# Patient Record
Sex: FEMALE | Race: BLACK OR AFRICAN AMERICAN | NOT HISPANIC OR LATINO | Employment: FULL TIME | URBAN - METROPOLITAN AREA
[De-identification: names, ages, dates, MRNs, and addresses within clinical notes are randomized per-mention and may not be internally consistent; named-entity substitution may affect disease eponyms.]

---

## 2017-09-25 ENCOUNTER — HISTORICAL (OUTPATIENT)
Dept: ADMINISTRATIVE | Facility: HOSPITAL | Age: 47
End: 2017-09-25

## 2017-12-13 ENCOUNTER — HISTORICAL (OUTPATIENT)
Dept: ENDOSCOPY | Facility: HOSPITAL | Age: 47
End: 2017-12-13

## 2017-12-13 LAB
B-HCG SERPL QL: NEGATIVE
CRC RECOMMENDATION EXT: NORMAL

## 2018-08-13 ENCOUNTER — HISTORICAL (OUTPATIENT)
Dept: RADIOLOGY | Facility: HOSPITAL | Age: 48
End: 2018-08-13

## 2021-03-02 ENCOUNTER — HISTORICAL (OUTPATIENT)
Dept: ADMINISTRATIVE | Facility: HOSPITAL | Age: 51
End: 2021-03-02

## 2021-06-09 LAB
BILIRUB SERPL-MCNC: NEGATIVE MG/DL
BLOOD URINE, POC: NEGATIVE
C TRACH DNA SPEC QL NAA+PROBE: NEGATIVE
CLARITY, POC UA: CLEAR
COLOR, POC UA: YELLOW
GLUCOSE UR QL STRIP: NEGATIVE
HUMAN PAPILLOMAVIRUS (HPV): NORMAL
KETONES UR QL STRIP: NEGATIVE
LEUKOCYTE EST, POC UA: NEGATIVE
NITRITE, POC UA: NEGATIVE
PAP RECOMMENDATION EXT: NORMAL
PAP SMEAR: NORMAL
PH, POC UA: 5.5
PROTEIN, POC: NORMAL
SPECIFIC GRAVITY, POC UA: NORMAL
UROBILINOGEN, POC UA: NORMAL

## 2021-08-18 ENCOUNTER — HISTORICAL (OUTPATIENT)
Dept: RADIOLOGY | Facility: HOSPITAL | Age: 51
End: 2021-08-18

## 2021-09-14 ENCOUNTER — HISTORICAL (OUTPATIENT)
Dept: ADMINISTRATIVE | Facility: HOSPITAL | Age: 51
End: 2021-09-14

## 2021-09-14 LAB
ABS NEUT (OLG): 4.93 X10(3)/MCL (ref 2.1–9.2)
ALBUMIN SERPL-MCNC: 4.5 GM/DL (ref 3.5–5)
ALBUMIN/GLOB SERPL: 1.1 RATIO (ref 1.1–2)
ALP SERPL-CCNC: 68 UNIT/L (ref 40–150)
ALT SERPL-CCNC: 23 UNIT/L (ref 0–55)
AST SERPL-CCNC: 20 UNIT/L (ref 5–34)
BASOPHILS # BLD AUTO: 0 X10(3)/MCL (ref 0–0.2)
BASOPHILS NFR BLD AUTO: 0 %
BILIRUB SERPL-MCNC: 0.4 MG/DL
BILIRUBIN DIRECT+TOT PNL SERPL-MCNC: 0.2 MG/DL (ref 0–0.5)
BILIRUBIN DIRECT+TOT PNL SERPL-MCNC: 0.2 MG/DL (ref 0–0.8)
BUN SERPL-MCNC: 12.2 MG/DL (ref 7–18.7)
CALCIUM SERPL-MCNC: 9.9 MG/DL (ref 8.4–10.2)
CHLORIDE SERPL-SCNC: 105 MMOL/L (ref 98–107)
CO2 SERPL-SCNC: 22 MMOL/L (ref 22–29)
CORTIS SERPL-SCNC: 9.7 UG/DL
CREAT SERPL-MCNC: 0.82 MG/DL (ref 0.55–1.02)
EOSINOPHIL # BLD AUTO: 0.1 X10(3)/MCL (ref 0–0.9)
EOSINOPHIL NFR BLD AUTO: 1 %
ERYTHROCYTE [DISTWIDTH] IN BLOOD BY AUTOMATED COUNT: 12.9 % (ref 11.5–14.5)
ESTRADIOL SERPL HS-MCNC: <24 PG/ML
FSH SERPL-ACNC: 74.32 MIU/ML
GLOBULIN SER-MCNC: 4.1 GM/DL (ref 2.4–3.5)
GLUCOSE SERPL-MCNC: 84 MG/DL (ref 74–100)
HCT VFR BLD AUTO: 46.9 % (ref 35–46)
HGB BLD-MCNC: 15.1 GM/DL (ref 12–16)
IMM GRANULOCYTES # BLD AUTO: 0.04 10*3/UL
IMM GRANULOCYTES NFR BLD AUTO: 0 %
LH SERPL-ACNC: 21.34 MIU/ML
LYMPHOCYTES # BLD AUTO: 2.4 X10(3)/MCL (ref 0.6–4.6)
LYMPHOCYTES NFR BLD AUTO: 30 %
MCH RBC QN AUTO: 32.5 PG (ref 26–34)
MCHC RBC AUTO-ENTMCNC: 32.2 GM/DL (ref 31–37)
MCV RBC AUTO: 101.1 FL (ref 80–100)
MONOCYTES # BLD AUTO: 0.4 X10(3)/MCL (ref 0.1–1.3)
MONOCYTES NFR BLD AUTO: 5 %
NEUTROPHILS # BLD AUTO: 4.93 X10(3)/MCL (ref 2.1–9.2)
NEUTROPHILS NFR BLD AUTO: 62 %
NRBC BLD AUTO-RTO: 0 % (ref 0–0.2)
PLATELET # BLD AUTO: 247 X10(3)/MCL (ref 130–400)
PMV BLD AUTO: 10.2 FL (ref 7.4–10.4)
POTASSIUM SERPL-SCNC: 4.1 MMOL/L (ref 3.5–5.1)
PROLACTIN LEVEL (OHS): 3.9 NG/ML (ref 5.18–26.53)
PROT SERPL-MCNC: 8.6 GM/DL (ref 6.4–8.3)
RBC # BLD AUTO: 4.64 X10(6)/MCL (ref 4–5.2)
SODIUM SERPL-SCNC: 139 MMOL/L (ref 136–145)
T4 FREE SERPL-MCNC: 1.04 NG/DL (ref 0.7–1.48)
TSH SERPL-ACNC: 0.44 UIU/ML (ref 0.35–4.94)
WBC # SPEC AUTO: 7.9 X10(3)/MCL (ref 4.5–11)

## 2021-09-28 ENCOUNTER — HISTORICAL (OUTPATIENT)
Dept: LAB | Facility: HOSPITAL | Age: 51
End: 2021-09-28

## 2021-09-28 LAB
ABS NEUT (OLG): 5.8 X10(3)/MCL (ref 2.1–9.2)
BASOPHILS # BLD AUTO: 0 X10(3)/MCL (ref 0–0.2)
BASOPHILS NFR BLD AUTO: 0 %
CORTIS SERPL-SCNC: <1 UG/DL
EOSINOPHIL # BLD AUTO: 0 X10(3)/MCL (ref 0–0.9)
EOSINOPHIL NFR BLD AUTO: 0 %
ERYTHROCYTE [DISTWIDTH] IN BLOOD BY AUTOMATED COUNT: 12.8 % (ref 11.5–17)
FOLATE SERPL-MCNC: 5.8 NG/ML (ref 7–31.4)
HCT VFR BLD AUTO: 39.3 % (ref 37–47)
HGB BLD-MCNC: 12.9 GM/DL (ref 12–16)
IMM GRANULOCYTES # BLD AUTO: 0.02 % (ref 0–0.02)
IMM GRANULOCYTES NFR BLD AUTO: 0.3 % (ref 0–0.43)
LYMPHOCYTES # BLD AUTO: 1.5 X10(3)/MCL (ref 0.6–4.6)
LYMPHOCYTES NFR BLD AUTO: 19 %
MCH RBC QN AUTO: 32.8 PG (ref 27–31)
MCHC RBC AUTO-ENTMCNC: 32.8 GM/DL (ref 33–36)
MCV RBC AUTO: 100 FL (ref 80–94)
MONOCYTES # BLD AUTO: 0.2 X10(3)/MCL (ref 0.1–1.3)
MONOCYTES NFR BLD AUTO: 3 %
NEUTROPHILS # BLD AUTO: 5.8 X10(3)/MCL (ref 1.4–7.9)
NEUTROPHILS NFR BLD AUTO: 77 %
PLATELET # BLD AUTO: 267 X10(3)/MCL (ref 130–400)
PMV BLD AUTO: 9.7 FL (ref 9.4–12.4)
RBC # BLD AUTO: 3.93 X10(6)/MCL (ref 4.2–5.4)
RET# (OHS): 0.06 X10^6/ML (ref 0.02–0.08)
RETICULOCYTE COUNT AUTOMATED (OLG): 1.6 % (ref 1.1–2.1)
VIT B12 SERPL-MCNC: 1054 PG/ML (ref 213–816)
WBC # SPEC AUTO: 7.5 X10(3)/MCL (ref 4.5–11.5)

## 2021-09-29 ENCOUNTER — HISTORICAL (OUTPATIENT)
Dept: RADIOLOGY | Facility: HOSPITAL | Age: 51
End: 2021-09-29

## 2021-12-10 ENCOUNTER — HISTORICAL (OUTPATIENT)
Dept: RADIOLOGY | Facility: HOSPITAL | Age: 51
End: 2021-12-10

## 2022-02-02 ENCOUNTER — HISTORICAL (OUTPATIENT)
Dept: LAB | Facility: HOSPITAL | Age: 52
End: 2022-02-02

## 2022-02-02 LAB
ABS NEUT (OLG): 6.44 (ref 2.1–9.2)
ALBUMIN SERPL-MCNC: 4.3 G/DL (ref 3.5–5)
ALBUMIN/GLOB SERPL: 1.6 {RATIO} (ref 1.1–2)
ALP SERPL-CCNC: 67 U/L (ref 40–150)
ALT SERPL-CCNC: 18 U/L (ref 0–55)
AST SERPL-CCNC: 17 U/L (ref 5–34)
BASOPHILS # BLD AUTO: 0 10*3/UL (ref 0–0.2)
BASOPHILS NFR BLD AUTO: 0 %
BILIRUB SERPL-MCNC: 0.2 MG/DL
BILIRUBIN DIRECT+TOT PNL SERPL-MCNC: <0.1 (ref 0–0.5)
BILIRUBIN DIRECT+TOT PNL SERPL-MCNC: >0.1 (ref 0–0.8)
BUN SERPL-MCNC: 11.4 MG/DL (ref 9.8–20.1)
CALCIUM SERPL-MCNC: 9.3 MG/DL (ref 8.7–10.5)
CHLORIDE SERPL-SCNC: 107 MMOL/L (ref 98–107)
CHOLEST SERPL-MCNC: 237 MG/DL
CHOLEST/HDLC SERPL: 4 {RATIO} (ref 0–5)
CO2 SERPL-SCNC: 27 MMOL/L (ref 22–29)
CREAT SERPL-MCNC: 0.64 MG/DL (ref 0.55–1.02)
DEPRECATED CALCIDIOL+CALCIFEROL SERPL-MC: 10.1 NG/ML (ref 30–80)
EOSINOPHIL # BLD AUTO: 0.1 10*3/UL (ref 0–0.9)
EOSINOPHIL NFR BLD AUTO: 1 %
ERYTHROCYTE [DISTWIDTH] IN BLOOD BY AUTOMATED COUNT: 13 % (ref 11.5–17)
EST. AVERAGE GLUCOSE BLD GHB EST-MCNC: 102.5 MG/DL
GLOBULIN SER-MCNC: 2.7 G/DL (ref 2.4–3.5)
GLUCOSE SERPL-MCNC: 108 MG/DL (ref 74–100)
HBA1C MFR BLD: 5.2 %
HCT VFR BLD AUTO: 39 % (ref 37–47)
HDLC SERPL-MCNC: 63 MG/DL (ref 35–60)
HEMOLYSIS INTERF INDEX SERPL-ACNC: 3
HGB BLD-MCNC: 12.6 G/DL (ref 12–16)
ICTERIC INTERF INDEX SERPL-ACNC: 0
IMM GRANULOCYTES # BLD AUTO: 0.06 10*3/UL (ref 0–0.02)
IMM GRANULOCYTES NFR BLD AUTO: 0.6 % (ref 0–0.43)
LDLC SERPL CALC-MCNC: 154 MG/DL (ref 50–140)
LIPEMIC INTERF INDEX SERPL-ACNC: 4
LYMPHOCYTES # BLD AUTO: 2.6 10*3/UL (ref 0.6–4.6)
LYMPHOCYTES NFR BLD AUTO: 27 %
MANUAL DIFF? (OHS): NO
MCH RBC QN AUTO: 31.3 PG (ref 27–31)
MCHC RBC AUTO-ENTMCNC: 32.3 G/DL (ref 33–36)
MCV RBC AUTO: 97 FL (ref 80–94)
MONOCYTES # BLD AUTO: 0.3 10*3/UL (ref 0.1–1.3)
MONOCYTES NFR BLD AUTO: 4 %
NEUTROPHILS # BLD AUTO: 6.44 10*3/UL (ref 1.4–7.9)
NEUTROPHILS NFR BLD AUTO: 68 %
PLATELET # BLD AUTO: 280 10*3/UL (ref 130–400)
PMV BLD AUTO: 10 FL (ref 9.4–12.4)
POTASSIUM SERPL-SCNC: 4.2 MMOL/L (ref 3.5–5.1)
PROT SERPL-MCNC: 7 G/DL (ref 6.4–8.3)
RBC # BLD AUTO: 4.02 10*6/UL (ref 4.2–5.4)
SODIUM SERPL-SCNC: 142 MMOL/L (ref 136–145)
T3FREE SERPL-MCNC: 2.06 PG/ML (ref 1.58–3.91)
TRIGL SERPL-MCNC: 100 MG/DL (ref 37–140)
TSH SERPL-ACNC: 1.04 M[IU]/L (ref 0.35–4.94)
VLDLC SERPL CALC-MCNC: 20 MG/DL
WBC # SPEC AUTO: 9.5 10*3/UL (ref 4.5–11.5)

## 2022-02-23 ENCOUNTER — HISTORICAL (OUTPATIENT)
Dept: RADIOLOGY | Facility: HOSPITAL | Age: 52
End: 2022-02-23

## 2022-02-23 ENCOUNTER — HISTORICAL (OUTPATIENT)
Dept: ADMINISTRATIVE | Facility: HOSPITAL | Age: 52
End: 2022-02-23

## 2022-03-07 ENCOUNTER — HISTORICAL (OUTPATIENT)
Dept: RADIOLOGY | Facility: HOSPITAL | Age: 52
End: 2022-03-07

## 2022-04-10 ENCOUNTER — HISTORICAL (OUTPATIENT)
Dept: ADMINISTRATIVE | Facility: HOSPITAL | Age: 52
End: 2022-04-10
Payer: MEDICAID

## 2022-04-29 VITALS
HEIGHT: 69 IN | SYSTOLIC BLOOD PRESSURE: 106 MMHG | BODY MASS INDEX: 23.51 KG/M2 | WEIGHT: 158.75 LBS | DIASTOLIC BLOOD PRESSURE: 75 MMHG | OXYGEN SATURATION: 100 %

## 2022-04-30 NOTE — OP NOTE
Patient:   Galina Walker            MRN: [id number]            FIN: [id number]               Age:   46 years     Sex:  Female     :  1970   Associated Diagnoses:   None   Author:   Hollis Paulino MD      Procedure   Colonoscopy procedure   Physical Exam: vital signs Vital Signs   2017 7:51 CST      Temperature Oral          37 DegC                             Temperature Oral (calculated)             98.60 DegF                             Heart Rate Monitored      86 bpm                             Respiratory Rate          18 br/min                             SpO2                      100 %                             Oxygen Therapy            Room air                             Systolic Blood Pressure   127 mmHg                             Diastolic Blood Pressure  104 mmHg  HI                             Mean Arterial Pressure, Cuff              112 mmHg     .        Impression and Plan   DATE OF PROCEDURE:  17    PATIENT NAME: Galina Walker    MRN: [id number]  PREOPERATIVE DIAGNOSIS: Hx colon cancer s/p subtotal colectomy   POSTOPERATIVE DIAGNOSIS:  same  PROCEDURE PERFORMED:    Flex sigmoidoscopy  ENDOSCOPIST:  Timoteo Majano MD  ASSISTANT:  Hollis Paulino MD  ANESTHESIA:  Deep Sedation  EXTENT OF EXAM:  ileocolic anastomosis  EBL: none  PREPARATION:  Good  LIMITATIONS:  None.  INDICATIONS FOR EXAMINATION:  The patient is a 47yo F Hx colon cancer s/p subtotal colectomy, here for screening colonoscopy  PROCEDURE IN DETAIL:  A physical examination was performed. The major risks and benefits associated with the procedure were explained to the patient in detail. The patient verbalized understanding and agreement with the same.  The patient was connected to the appropriate monitoring devices and an IV was started. Continuous oxygen was provided via nasal cannula and intravenous sedation was administered in divided doses throughout the procedure.   After adequate sedation was  achieved, the patient was placed in the left lateral decubitus position and a digital rectal exam was performed. This examination was within normal limits. A well-lubricated colonoscope was then inserted into the rectum and advanced under direct visualization to the level of the ileocolic anastomosis, about 20cm. The findings were consistent with normal colonic mucosa and patent anastomosis. The scope was then retroflexed to view the dentate line. No abnormalities. The scope was completely retrieved upon exiting the anal canal and the procedure was terminated. The patient was then transferred to the recovery room in stable condition.  ENDOSCOPIC DIAGNOSIS:  Hx colon cancer s/p subtotal colectomy, patent anastomosis, normal colon  RECOMMENDATIONS:  Follow up in 5 years repeat colonoscopy

## 2022-05-01 ENCOUNTER — HOSPITAL ENCOUNTER (EMERGENCY)
Facility: HOSPITAL | Age: 52
Discharge: HOME | End: 2022-05-02
Attending: INTERNAL MEDICINE
Payer: MEDICAID

## 2022-05-01 DIAGNOSIS — K52.9 ILEITIS: Primary | ICD-10-CM

## 2022-05-01 PROCEDURE — 96374 THER/PROPH/DIAG INJ IV PUSH: CPT

## 2022-05-01 PROCEDURE — 99285 EMERGENCY DEPT VISIT HI MDM: CPT | Mod: 25

## 2022-05-01 NOTE — Clinical Note
"Galina Pitts" Aaron was seen and treated in our emergency department on 5/1/2022.  She may return to work on 05/03/2022.       If you have any questions or concerns, please don't hesitate to call.      DEENA GRAHAM    "

## 2022-05-02 VITALS
WEIGHT: 165 LBS | HEART RATE: 63 BPM | RESPIRATION RATE: 16 BRPM | BODY MASS INDEX: 24.44 KG/M2 | DIASTOLIC BLOOD PRESSURE: 73 MMHG | HEIGHT: 69 IN | SYSTOLIC BLOOD PRESSURE: 131 MMHG | OXYGEN SATURATION: 98 % | TEMPERATURE: 98 F

## 2022-05-02 LAB
ALBUMIN SERPL-MCNC: 4.4 GM/DL (ref 3.5–5)
ALBUMIN/GLOB SERPL: 1.3 RATIO (ref 1.1–2)
ALP SERPL-CCNC: 61 UNIT/L (ref 40–150)
ALT SERPL-CCNC: 15 UNIT/L (ref 0–55)
AMORPH PHOS CRY URNS QL MICRO: NORMAL /HPF
AMORPH URATE CRY UR QL COMP ASSIST: NORMAL /HPF
AMORPH URATE CRY URNS QL MICRO: NORMAL /HPF
APPEARANCE UR: CLEAR
AST SERPL-CCNC: 19 UNIT/L (ref 5–34)
BACTERIA #/AREA URNS AUTO: NORMAL /HPF
BASOPHILS # BLD AUTO: 0.03 X10(3)/MCL (ref 0–0.2)
BASOPHILS NFR BLD AUTO: 0.4 %
BILIRUB CRY URNS QL MICRO: NORMAL /HPF
BILIRUB UR QL STRIP.AUTO: NEGATIVE MG/DL
BILIRUBIN DIRECT+TOT PNL SERPL-MCNC: 0.2 MG/DL (ref 0–0.5)
BILIRUBIN DIRECT+TOT PNL SERPL-MCNC: 0.3 MG/DL (ref 0–0.8)
BILIRUBIN DIRECT+TOT PNL SERPL-MCNC: 0.5 MG/DL
BUN SERPL-MCNC: 10.1 MG/DL (ref 9.8–20.1)
CALCIUM SERPL-MCNC: 9.9 MG/DL (ref 8.4–10.2)
CAOX CRY URNS QL MICRO: NORMAL /HPF
CHLORIDE SERPL-SCNC: 100 MMOL/L (ref 98–107)
CHOLEST CRY URNS QL MICRO: NORMAL /HPF
CO2 SERPL-SCNC: 28 MMOL/L (ref 22–29)
COARSE GRAN CASTS URNS QL MICRO: NORMAL /HPF
COLOR UR AUTO: YELLOW
CREAT SERPL-MCNC: 0.75 MG/DL (ref 0.55–1.02)
CYSTINE CRY URNS QL MICRO: NORMAL /HPF
EOSINOPHIL # BLD AUTO: 0.08 X10(3)/MCL (ref 0–0.9)
EOSINOPHIL NFR BLD AUTO: 1 %
ERYTHROCYTE [DISTWIDTH] IN BLOOD BY AUTOMATED COUNT: 13 % (ref 11.5–17)
FINE GRAN CASTS URNS QL MICRO: NORMAL /HPF
GLOBULIN SER-MCNC: 3.3 GM/DL (ref 2.4–3.5)
GLUCOSE SERPL-MCNC: 100 MG/DL (ref 74–100)
GLUCOSE UR QL STRIP.AUTO: NEGATIVE MG/DL
GRAN CASTS URNS QL MICRO: NORMAL /HPF
HCT VFR BLD AUTO: 39.5 % (ref 37–47)
HGB BLD-MCNC: 12.6 GM/DL (ref 12–16)
HYALINE CASTS URNS QL MICRO: NORMAL /HPF
IMM GRANULOCYTES # BLD AUTO: 0.03 X10(3)/MCL (ref 0–0.02)
IMM GRANULOCYTES NFR BLD AUTO: 0.4 % (ref 0–0.43)
KETONES UR QL STRIP.AUTO: NEGATIVE MG/DL
LEUCINE CRY URNS QL MICRO: NORMAL /HPF
LEUKOCYTE ESTERASE UR QL STRIP.AUTO: NEGATIVE UNIT/L
LIPASE SERPL-CCNC: 58 U/L
LYMPHOCYTES # BLD AUTO: 3.35 X10(3)/MCL (ref 0.6–4.6)
LYMPHOCYTES NFR BLD AUTO: 40.2 %
MCH RBC QN AUTO: 31.6 PG (ref 27–31)
MCHC RBC AUTO-ENTMCNC: 31.9 MG/DL (ref 33–36)
MCV RBC AUTO: 99 FL (ref 80–94)
MONOCYTES # BLD AUTO: 0.49 X10(3)/MCL (ref 0.1–1.3)
MONOCYTES NFR BLD AUTO: 5.9 %
MUCOUS THREADS URNS QL MICRO: NORMAL /LPF
NEUTROPHILS # BLD AUTO: 4.4 X10(3)/MCL (ref 2.1–9.2)
NEUTROPHILS NFR BLD AUTO: 52.1 %
NITRITE UR QL STRIP.AUTO: NEGATIVE
NRBC BLD AUTO-RTO: 0 %
PH UR STRIP.AUTO: 6 [PH]
PLATELET # BLD AUTO: 274 X10(3)/MCL (ref 130–400)
PLATELETS.RETICULATED NFR BLD AUTO: 0 % (ref 0.9–11.2)
PMV BLD AUTO: 9.5 FL (ref 9.4–12.4)
POTASSIUM SERPL-SCNC: 3.4 MMOL/L (ref 3.5–5.1)
PROT SERPL-MCNC: 7.7 GM/DL (ref 6.4–8.3)
PROT UR QL STRIP.AUTO: NEGATIVE MG/DL
RBC # BLD AUTO: 3.99 X10(6)/MCL (ref 4.2–5.4)
RBC #/AREA URNS AUTO: NORMAL /HPF
RBC CASTS URNS QL MICRO: NORMAL /HPF
RBC UR QL AUTO: NEGATIVE UNIT/L
RENAL EPI CELLS #/AREA UR COMP ASSIST: NORMAL /HPF
SODIUM SERPL-SCNC: 138 MMOL/L (ref 136–145)
SP GR UR STRIP.AUTO: <1.005
SPERM URNS QL MICRO: NORMAL /HPF
SQUAMOUS #/AREA URNS AUTO: NORMAL /LPF
T VAGINALIS URNS QL MICRO: NORMAL /HPF
TRI-PHOS CRY URNS QL MICRO: NORMAL /HPF
TYROSINE CRY URNS QL MICRO: NORMAL /HPF
URATE CRY URNS QL MICRO: NORMAL /HPF
UROBILINOGEN UR STRIP-ACNC: 0.2 MG/DL
WAXY CASTS URNS QL MICRO: NORMAL /HPF
WBC # SPEC AUTO: 8.3 X10(3)/MCL (ref 4.5–11.5)
WBC #/AREA URNS AUTO: NORMAL /HPF
WBC CASTS URNS QL MICRO: NORMAL /HPF
WBC CLUMPS UR QL AUTO: NORMAL /HPF
YEAST URNS QL MICRO: NORMAL /HPF

## 2022-05-02 PROCEDURE — 25000003 PHARM REV CODE 250

## 2022-05-02 PROCEDURE — 80053 COMPREHEN METABOLIC PANEL: CPT | Performed by: INTERNAL MEDICINE

## 2022-05-02 PROCEDURE — 63600175 PHARM REV CODE 636 W HCPCS: Performed by: INTERNAL MEDICINE

## 2022-05-02 PROCEDURE — 85025 COMPLETE CBC W/AUTO DIFF WBC: CPT | Performed by: INTERNAL MEDICINE

## 2022-05-02 PROCEDURE — 81015 MICROSCOPIC EXAM OF URINE: CPT | Performed by: INTERNAL MEDICINE

## 2022-05-02 PROCEDURE — 81003 URINALYSIS AUTO W/O SCOPE: CPT | Performed by: INTERNAL MEDICINE

## 2022-05-02 PROCEDURE — 83690 ASSAY OF LIPASE: CPT | Performed by: INTERNAL MEDICINE

## 2022-05-02 PROCEDURE — 36415 COLL VENOUS BLD VENIPUNCTURE: CPT | Performed by: INTERNAL MEDICINE

## 2022-05-02 PROCEDURE — 96372 THER/PROPH/DIAG INJ SC/IM: CPT | Performed by: INTERNAL MEDICINE

## 2022-05-02 RX ORDER — CIPROFLOXACIN 500 MG/1
TABLET ORAL
Status: COMPLETED
Start: 2022-05-02 | End: 2022-05-02

## 2022-05-02 RX ORDER — MORPHINE SULFATE 4 MG/ML
2 INJECTION, SOLUTION INTRAMUSCULAR; INTRAVENOUS
Status: COMPLETED | OUTPATIENT
Start: 2022-05-02 | End: 2022-05-02

## 2022-05-02 RX ORDER — MORPHINE SULFATE 4 MG/ML
4 INJECTION, SOLUTION INTRAMUSCULAR; INTRAVENOUS
Status: COMPLETED | OUTPATIENT
Start: 2022-05-02 | End: 2022-05-02

## 2022-05-02 RX ORDER — METRONIDAZOLE 500 MG/1
TABLET ORAL
Status: COMPLETED
Start: 2022-05-02 | End: 2022-05-02

## 2022-05-02 RX ORDER — CIPROFLOXACIN 500 MG/1
500 TABLET ORAL
Status: COMPLETED | OUTPATIENT
Start: 2022-05-02 | End: 2022-05-02

## 2022-05-02 RX ORDER — CIPROFLOXACIN 500 MG/1
500 TABLET ORAL 2 TIMES DAILY
Qty: 20 TABLET | Refills: 0 | Status: SHIPPED | OUTPATIENT
Start: 2022-05-02 | End: 2022-05-12

## 2022-05-02 RX ORDER — METRONIDAZOLE 500 MG/1
500 TABLET ORAL
Status: COMPLETED | OUTPATIENT
Start: 2022-05-02 | End: 2022-05-02

## 2022-05-02 RX ORDER — METRONIDAZOLE 500 MG/1
500 TABLET ORAL 3 TIMES DAILY
Qty: 21 TABLET | Refills: 0 | Status: SHIPPED | OUTPATIENT
Start: 2022-05-02 | End: 2022-05-12

## 2022-05-02 RX ADMIN — METRONIDAZOLE 500 MG: 500 TABLET ORAL at 04:05

## 2022-05-02 RX ADMIN — CIPROFLOXACIN 500 MG: 500 TABLET ORAL at 04:05

## 2022-05-02 RX ADMIN — MORPHINE SULFATE 2 MG: 4 INJECTION, SOLUTION INTRAMUSCULAR; INTRAVENOUS at 01:05

## 2022-05-02 RX ADMIN — MORPHINE SULFATE 4 MG: 4 INJECTION, SOLUTION INTRAMUSCULAR; INTRAVENOUS at 02:05

## 2022-05-02 RX ADMIN — CIPROFLOXACIN 500 MG: 500 TABLET, FILM COATED ORAL at 04:05

## 2022-05-02 NOTE — HISTORICAL [facility] CERNER
This is a historical note converted from [facility]ia. Formatting and pictures may have been removed.  Please reference [facility] for original formatting and attached multimedia. History of Present Illness  51 y/o female with history of migraines, colon cancer (s/p part. colectomy, never received radiation or chemo), SBO, depression, tubal ligation, neck surgery, and pituitary tumor. Presents to clinic today as new patient for evaluation of pituitary mass. Reports onset of galactorrhea in 2014, at which time her PCP referred her for MRI and referred her to [place] for surgical evaluation. She states she did not desire surgery at that time. She reports being referred to endocrine clinic today for evaluation for medical management. She states that her galactorrhea has been ongoing and fairly constant, reports fullness and heaviness in her breasts bilaterally. She reports no past medical treatments for this condition. Of note, she is also taking lurasidone that she states she has been taking for sleep, she states she was not taking this when she had onset of galactorrhea. She reports amenorrhea for the past 6 years. She states that she recently had a neck surgery with hardware, but called her surgeon who told her that she was cleared to have an MRI. She endorses vision changes that have also been ongoing for the past year, stating that occasionally she has blurry vision and occasionally she has darkness in her visual fields. She reports seeing an optometrist recently, but states she has not seen an ophthalmologist in a couple years and would like a referral to [facility] ophthalmology. Otherwise ROS as below. States that she would like medical therapy at this time as opposed to surgery if possible.  ?  Past medical/surgical Hx: as above  Social History: 34 pack/year smoking cigarettes, no EtOH, no illicits  Family History: Dad colon CA, Mom ovarian CA  Review of Systems  Endorses: Fatigue that she states is off and on, milky,  white, bilateral breast discharge, RLQ abdominal pain that she follows with gynecology for, frequent urination, frequent loose stools that she states has been ongoing since her partial colectomy, coryza and sinus drainage that she has tried OTC meds for, also reports trouble with balance that has been ongoing, reports 2x migraines per month that she states last for a few days (no aura, but has sensitivity to light and sound), amenorrhea x 6 years, syncope that she reports has occurred a couple of times this past year of unknown etiology  ?  Denies: weakness  Physical Exam  Vitals & Measurements  T:?36.9? ?C (Oral)? HR:?98(Peripheral)? RR:?17? BP:?122/85?  HT:?175.00?cm? WT:?74.400?kg? BMI:?24.29?  General: NAD, conversing normally  HEENT: Bitemporal hemianopsia, PEERLA,?EOMI,?pupils 4mm bilaterally  CV: RRR, no murmurs  Resp: CTAB over upper and lower posterior lung fields  ABD: Normoactive, soft, nontender  MSK: Moving all extremities, no gross deficits noted  Assessment/Plan  1.?Pituitary mass?E23.6  -Reports that she had an MRI several years ago after onset of galactorrhea in 2014, which she states is bilateral, milky discharge that has been stable since its onset  -Also states that she has had vision changes within the past 3 years  -Referred by PCP to NSGY in [place], but patient did not desire surgical management at that time  -MRI records not available, will request records and attempt to get an updated MRI  -Prolactin mildly elevated on recent labs at 24.8, will recheck prolactin on labs at this visit  -Will rule out other pituitary tumors with dexamethasone suppression test, ACTH and cortisol, IGF-1, and thyroid function test  ?  2.?Galactorrhea?N64.3  -Reports onset in 2014, after which she was diagnosed with a pituitary mass on MRI  -Of note, she is also taking lurasidone, but she states that the onset of galactorrhea was before taking lurasidone  -Reports that her galactorrhea is ongoing, and has not  progressed over the past 7 years  -Also follows with gynecology at [facility], most recent appointment June 9th  ?  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  4.?Syncope?R55  -Following with PCP  -Will rule out adrenal insufficiency and hypothyroid from endocrine standpoint  -Thyroid function test, ACTH, and cortisol?on labs before next?visit  ?  5.?Secondary amenorrhea?N91.1  -States that she has not menstruated since onset of galactorrhea in 2014  -Likely secondary to hyperprolactinemia  -Patient follows with gynecology at [facility], most recent appt. June 9 of this year  ?  F/U 2 months after labs and imaging  ?  Fabian Johnson, MS4  Referrals  Clinic Follow up, *Est. 11/14/21 3:00:00 CST, Order for future visit, Pituitary mass  Galactorrhea  Bitemporal hemianopsia  Syncope, [facility]  ?  I saw the patient with the student and discussed the case, assisted with the development of the assessment and agree with the plan of care w/ the following addendum.? Pt A, NAD.? Pt has had tumor dx 4-6 yrs ago after c/o galactorrhea w/ secondary amenorrhea and bitemporal hemianopsia.? Complicated by being tx w/ dopaminergic rx.? Already assess by ophtho 2 yrs ago, optometry 3 months ago, requesting records.? Last MRI a 2 yrs ago, requesting records.? Will further sort via labs and records and refer to ophtho.? Pt states had 2 NSY opinions and was offered surgery but deferred in the past for fear of brain damage.? Await more information from eval to guide further care.? Counseled for c/o sinus d/s to call  PCP/urgent care.  Colton Ayala MD, FACE   Problem List/Past Medical History  Ongoing  Back pain  Migraine  Pituitary tumor  Tobacco user  Historical  Colon cancer  Pregnant  Pregnant  Pregnant  Pregnant  Pregnant  Procedure/Surgical History  Colonoscopy (2017)  Inspection of Lower Intestinal Tract, Via Natural or Artificial Opening Endoscopic (2017)  Sigmoidoscopy, flexible; diagnostic, including collection of specimen(s) by brushing or washing, when performed (separate procedure) (2017)   delivery only;  Colon partial removal  Neck  Right ovarian cyst  TL   Medications  baclofen 10 mg oral tablet, 10 mg= 1 tab(s), Oral, TID, PRN,? ?Not taking  Cymbalta 60 mg oral delayed release capsule, 60 mg= 1 cap(s), Oral, BID  Latuda 80 mg oral tablet, 80 mg= 1 tab(s), Oral, qPM  nabumetone 750 mg oral tablet, 750 mg= 1 tab(s), Oral, BID  Allergies  No Known Allergies  No Known Medication Allergies  Social History  Abuse/Neglect  No, No, Yes, 2021  Alcohol - Denies Alcohol Use, 12/15/2014  Never, 2021  Employment/School  Disabled, 2021  Exercise  Exercise duration: 60. Exercise frequency: 3-4 times/week. Exercise type: Weight lifting., 2021  Home/Environment  Lives with pt states a friend . Living situation: Home/Independent., 2021    Never in , 2021  Nutrition/Health  Regular, 2021  Sexual  Sexually active: Yes. Gender Identity Identifies as female., 2021  Spiritual/Cultural  Mandaeism, 2021  Substance Use - Denies Substance Abuse, 12/15/2014  Never, 2021  Tobacco - High Risk, 2015  5-9 cigarettes (between 1/4 to 1/2 pack)/day in last 30 days, No, 2021  Family History  Primary malignant neoplasm of ovary: Mother.  Health Maintenance  Health Maintenance  ???Pending?(in the next year)  ??? ??OverDue  ??? ? ? ?Diabetes Screening due??21??and every 3??year(s)  ??? ??Due?  ??? ? ? ?Lipid Screening  due??09/14/21??Unknown Frequency  ??? ? ? ?Tetanus Vaccine due??09/14/21??and every 10??year(s)  ??? ??Postponed?  ??? ? ? ?Smoking Cessation due??09/09/22??and every 1??year(s)  ??? ??Due In Future?  ??? ? ? ?Obesity Screening not due until??01/01/22??and every 1??year(s)  ??? ? ? ?Alcohol Misuse Screening not due until??01/02/22??and every 1??year(s)  ??? ? ? ?ADL Screening not due until??06/09/22??and every 1??year(s)  ???Satisfied?(in the past 1 year)  ??? ??Satisfied?  ??? ? ? ?ADL Screening on??06/09/21.??Satisfied by Mirtha Clemens LPN  ??? ? ? ?Alcohol Misuse Screening on??09/14/21.??Satisfied by Elena Davies  ??? ? ? ?Blood Pressure Screening on??09/14/21.??Satisfied by Elena Davies  ??? ? ? ?Body Mass Index Check on??09/14/21.??Satisfied by Elena Davies  ??? ? ? ?Breast Cancer Screening on??08/18/21.??Satisfied by Linda Alexander  ??? ? ? ?Cervical Cancer Screening on??06/09/21.??Satisfied by Jacquelin Desai  ??? ? ? ?Depression Screening on??09/14/21.??Satisfied by Elena Davies  ??? ? ? ?Diabetes Screening on??04/14/21.??Satisfied by Adilia Flores  ??? ? ? ?Obesity Screening on??09/14/21.??Satisfied by Elena Davies  ??? ??Postponed?  ??? ? ? ?Smoking Cessation on??09/14/21.??Recorded by Elena Davies  ?

## 2022-05-02 NOTE — PROVIDER PROGRESS NOTES - EMERGENCY DEPT.
Encounter Date: 5/1/2022    ED Physician Progress Notes        Physician Note:   CT abd/pelvis showed ileitis will give Cipro and Flagyl now the discharge, follow up with PCP in 1 week.

## 2022-05-02 NOTE — ED PROVIDER NOTES
Encounter Date: 5/1/2022       History     Chief Complaint   Patient presents with    Abdominal Pain     Pt complaints of abd pain that 3 days ago and burning on urination started last night. Pt reports urinary frequency and doesn't feel she's emptying bladder. Pt also reports a white discharge     50y/o female c/o lower abdominal pain since yesterday described as tightness steady nonradiating associated with pain on urination. She denies fever, vomiting or diarrhea. However she states she wets herself spontaneously. The lower abdominal pain is worse when she is flat on bed. Denies change in bowel habits.     The history is provided by the patient.     Review of patient's allergies indicates:   Allergen Reactions    Ibuprofen      Burns pt's stomach     No past medical history on file.  No past surgical history on file.  No family history on file.     Review of Systems   Constitutional: Negative for chills and fever.   HENT: Negative.    Eyes: Negative.    Respiratory: Negative for chest tightness and shortness of breath.    Cardiovascular: Negative for chest pain.   Gastrointestinal: Positive for abdominal pain. Negative for blood in stool, diarrhea and vomiting.   Endocrine: Negative.    Genitourinary: Positive for dysuria.   Musculoskeletal: Negative for back pain.   Neurological: Negative for dizziness, syncope and light-headedness.   Psychiatric/Behavioral: Negative.        Physical Exam     Initial Vitals [05/01/22 2350]   BP Pulse Resp Temp SpO2   (!) 147/93 (!) 55 20 98.4 °F (36.9 °C) 100 %      MAP       --         Physical Exam    Vitals reviewed.  Constitutional: She appears well-developed and well-nourished. No distress.   HENT:   Head: Normocephalic and atraumatic.   Eyes: Conjunctivae and EOM are normal. Pupils are equal, round, and reactive to light. No scleral icterus.   Neck: Neck supple. No JVD present.   Normal range of motion.  Cardiovascular: Normal rate, regular rhythm and normal heart  sounds.   Pulmonary/Chest: Breath sounds normal.   Abdominal: Abdomen is soft. Bowel sounds are normal.   Musculoskeletal:         General: Normal range of motion.      Cervical back: Normal range of motion and neck supple.     Neurological: She is alert.   Skin: Skin is warm. Capillary refill takes less than 2 seconds.         ED Course   Procedures  Labs Reviewed   COMPREHENSIVE METABOLIC PANEL - Abnormal; Notable for the following components:       Result Value    Potassium Level 3.4 (*)     All other components within normal limits   CBC WITH DIFFERENTIAL - Abnormal; Notable for the following components:    RBC 3.99 (*)     MCV 99.0 (*)     MCH 31.6 (*)     MCHC 31.9 (*)     IPF 0 (*)     IG# 0.03 (*)     All other components within normal limits   URINALYSIS, MICROSCOPIC - Normal   LIPASE - Normal   URINALYSIS   CBC W/ AUTO DIFFERENTIAL    Narrative:     The following orders were created for panel order CBC auto differential.  Procedure                               Abnormality         Status                     ---------                               -----------         ------                     CBC with Differential[[id number]]        Abnormal            Final result                 Please view results for these tests on the individual orders.          Imaging Results          CT Abdomen Pelvis  Without Contrast (In process)  Result time 05/02/22 02:35:59                 Medications   diatrizoate meglumineand-diatrizoate sodium (GASTROVIEW) solution 30 mL (has no administration in time range)   morphine injection 2 mg (2 mg Intravenous Given 5/2/22 0100)   morphine injection 4 mg (4 mg Intramuscular Given 5/2/22 0230)                          Clinical Impression:      50y/o female presented with acute lower abdominal pain and dysuria will r/o UTI, do labs, Urinalysis then reassess pt.          Hubert Garrett MD  05/02/22 0489

## 2022-05-02 NOTE — PROVIDER PROGRESS NOTES - EMERGENCY DEPT.
Encounter Date: 5/1/2022    ED Physician Progress Notes        Physician Note:   Urinalysis showed normal result, cause of the abdominal pain not clear will do CT abd/pelvis with IV contrast but unable to insert peripheral IV via Ultrasound, will do with oral contrast. Tried to reach radiologist but no answer, left message to call back.

## 2022-05-11 ENCOUNTER — TELEPHONE (OUTPATIENT)
Dept: GYNECOLOGY | Facility: CLINIC | Age: 52
End: 2022-05-11
Payer: MEDICAID

## 2022-05-17 ENCOUNTER — TELEPHONE (OUTPATIENT)
Dept: GYNECOLOGY | Facility: CLINIC | Age: 52
End: 2022-05-17
Payer: MEDICAID

## 2022-05-17 NOTE — TELEPHONE ENCOUNTER
----- Message from Sultana Hamilton LPN sent at 5/11/2022 11:09 AM CDT -----  Regarding: FW: ED Follow up  Contact: Galina Walker  Called and spoke to patient advised that messages was sent to provider.   ----- Message -----  From: Sima Zuñiga  Sent: 5/11/2022  11:03 AM CDT  To: [facility] Staff  Subject: ED Follow up                                     Patient called today to get an appointment ASAP because she was in the ED and was told that her Colon is inflamed and has  hernia that needs to be look at/ check as soon as possible.   Patient is  requesting a call from the nurse or the Dr that is over seeing Dr Scott's cases until she comes back. Patient number is [phone]  Please advise

## 2022-05-26 RX ORDER — ERGOCALCIFEROL 1.25 MG/1
CAPSULE ORAL
Qty: 4 CAPSULE | Refills: 1 | Status: SHIPPED | OUTPATIENT
Start: 2022-05-26 | End: 2022-09-17

## 2022-06-07 ENCOUNTER — TELEPHONE (OUTPATIENT)
Dept: GYNECOLOGY | Facility: CLINIC | Age: 52
End: 2022-06-07
Payer: MEDICAID

## 2022-06-07 NOTE — TELEPHONE ENCOUNTER
----- Message from Nancy Moon sent at 6/6/2022 10:22 AM CDT -----  Regarding: Prescription Request  Patient called and would like a prescription for nausea.  Please and Thank You.

## 2022-06-08 NOTE — TELEPHONE ENCOUNTER
Patient would need visit to discuss side effects/interactions of nausea medication. Please give ED precautions. Patient could also try OTC.

## 2022-06-09 ENCOUNTER — OFFICE VISIT (OUTPATIENT)
Dept: GYNECOLOGY | Facility: CLINIC | Age: 52
End: 2022-06-09
Payer: MEDICAID

## 2022-06-09 VITALS
SYSTOLIC BLOOD PRESSURE: 125 MMHG | DIASTOLIC BLOOD PRESSURE: 81 MMHG | WEIGHT: 168.63 LBS | TEMPERATURE: 98 F | BODY MASS INDEX: 24.98 KG/M2 | RESPIRATION RATE: 20 BRPM | HEART RATE: 65 BPM | HEIGHT: 69 IN

## 2022-06-09 DIAGNOSIS — R10.11 RUQ ABDOMINAL PAIN: Primary | ICD-10-CM

## 2022-06-09 DIAGNOSIS — D35.2 PITUITARY MICROADENOMA: ICD-10-CM

## 2022-06-09 DIAGNOSIS — M54.50 LUMBAR BACK PAIN: ICD-10-CM

## 2022-06-09 PROCEDURE — 1159F PR MEDICATION LIST DOCUMENTED IN MEDICAL RECORD: ICD-10-PCS | Mod: CPTII,,, | Performed by: FAMILY MEDICINE

## 2022-06-09 PROCEDURE — 99214 OFFICE O/P EST MOD 30 MIN: CPT | Mod: S$PBB,,, | Performed by: FAMILY MEDICINE

## 2022-06-09 PROCEDURE — 3079F PR MOST RECENT DIASTOLIC BLOOD PRESSURE 80-89 MM HG: ICD-10-PCS | Mod: CPTII,,, | Performed by: FAMILY MEDICINE

## 2022-06-09 PROCEDURE — 99214 OFFICE O/P EST MOD 30 MIN: CPT | Mod: PBBFAC | Performed by: FAMILY MEDICINE

## 2022-06-09 PROCEDURE — 3079F DIAST BP 80-89 MM HG: CPT | Mod: CPTII,,, | Performed by: FAMILY MEDICINE

## 2022-06-09 PROCEDURE — 3074F PR MOST RECENT SYSTOLIC BLOOD PRESSURE < 130 MM HG: ICD-10-PCS | Mod: CPTII,,, | Performed by: FAMILY MEDICINE

## 2022-06-09 PROCEDURE — 3008F BODY MASS INDEX DOCD: CPT | Mod: CPTII,,, | Performed by: FAMILY MEDICINE

## 2022-06-09 PROCEDURE — 3008F PR BODY MASS INDEX (BMI) DOCUMENTED: ICD-10-PCS | Mod: CPTII,,, | Performed by: FAMILY MEDICINE

## 2022-06-09 PROCEDURE — 1159F MED LIST DOCD IN RCRD: CPT | Mod: CPTII,,, | Performed by: FAMILY MEDICINE

## 2022-06-09 PROCEDURE — 3074F SYST BP LT 130 MM HG: CPT | Mod: CPTII,,, | Performed by: FAMILY MEDICINE

## 2022-06-09 PROCEDURE — 99214 PR OFFICE/OUTPT VISIT, EST, LEVL IV, 30-39 MIN: ICD-10-PCS | Mod: S$PBB,,, | Performed by: FAMILY MEDICINE

## 2022-06-09 RX ORDER — BUSPIRONE HYDROCHLORIDE 15 MG/1
1 TABLET ORAL 2 TIMES DAILY
COMMUNITY
Start: 2022-05-29 | End: 2022-09-23

## 2022-06-09 RX ORDER — ONDANSETRON 4 MG/1
4 TABLET, ORALLY DISINTEGRATING ORAL EVERY 6 HOURS PRN
Qty: 20 TABLET | Refills: 0 | Status: SHIPPED | OUTPATIENT
Start: 2022-06-09 | End: 2023-03-09

## 2022-06-09 NOTE — PROGRESS NOTES
Galina Walker  06/09/2022  [id number]      Chief Complaint:  Chief Complaint   Patient presents with    ED Follow up        History of Present Illness:  51 y/o female with history of migraines, colon cancer (s/p part. colectomy, never received radiation or chemo), SBO, depression, tubal ligation, neck surgery, and pituitary tumor presents for ED follow up. Patient diagnosed with ileitis . Completed ABX. No improvement of symptoms. + nausea. Denied vomiting  Has not followed up with GI  Has not heard from neurosurgery for cyst. Would also like to be referred due to back pain. History of neck surgery per chart review  Patient reporting hoarsness. Was told she needs to watch out for Graves.         History:  History reviewed. No pertinent past medical history.  Past Surgical History:   Procedure Laterality Date    COLECTOMY  2017    COLONOSCOPY  01/27/2022    NECK SURGERY       History reviewed. No pertinent family history.  Social History     Socioeconomic History    Marital status: Single   Tobacco Use    Smoking status: Current Every Day Smoker     Packs/day: 1.00     Types: Cigarettes    Smokeless tobacco: Never Used   Substance and Sexual Activity    Alcohol use: Not Currently    Drug use: Never     There is no problem list on file for this patient.    Review of patient's allergies indicates:   Allergen Reactions    Ibuprofen      Burns pt's stomach         Medications:  Current Outpatient Medications on File Prior to Visit   Medication Sig Dispense Refill    busPIRone (BUSPAR) 15 MG tablet Take 1 tablet by mouth 2 (two) times daily.      ergocalciferol (ERGOCALCIFEROL) 50,000 unit Cap TAKE 1 CAPSULE BY MOUTH EVERY WEEK (Patient not taking: Reported on 6/9/2022) 4 capsule 1     No current facility-administered medications on file prior to visit.       Medications have been reviewed and reconciled with patient at this visit.    Adverse reactions to current medications reviewed with  patient..      Exam:  Wt Readings from Last 3 Encounters:   06/09/22 76.5 kg (168 lb 10.4 oz)   05/01/22 74.8 kg (165 lb)   01/26/22 72 kg (158 lb 11.7 oz)     Temp Readings from Last 3 Encounters:   06/09/22 97.5 °F (36.4 °C)   05/02/22 98.4 °F (36.9 °C) (Oral)     BP Readings from Last 3 Encounters:   06/09/22 125/81   05/02/22 131/73   10/05/21 106/75     Pulse Readings from Last 3 Encounters:   06/09/22 65   05/02/22 63     Body mass index is 24.91 kg/m².      ROS:  See HPI for details    All Other ROS: Negative except as stated in HPI.    PE:  General: Alert and oriented, No acute distress.  Head: Normocephalic, Atraumatic.  Eye: Pupils are equal, round and reactive to light, Extraocular movements are intact, Sclera non-icteric.  Ears/Nose/Throat: Normal, Mucosa moist,Clear.  Neck/Thyroid: Supple, Non-tender, No carotid bruit, No palpable thyromegaly or thyroid nodule, No lymphadenopathy, No JVD, Full range of motion.  Respiratory: Clear to auscultation bilaterally; No wheezes, rales or rhonchi,Non-labored respirations, Symmetrical chest wall expansion.  Cardiovascular: Regular rate and rhythm, S1/S2 normal, No murmurs, rubs or gallops.  Gastrointestinal: Soft, , Non-distended, Normal bowel sounds, No palpable organomegaly, Tenderness lateral RUQ  Musculoskeletal: Normal range of motion.  Integumentary: Warm, Dry, Intact, No suspicious lesions or rashes.  Extremities: No clubbing, cyanosis or edema  Neurologic: No focal deficits, Cranial Nerves II-XII are grossly intact, Motor strength normal upper and lower extremities, Sensory exam intact.  Psychiatric: Normal interaction, Coherent speech, Euthymic mood, Appropriate affect    Laboratory Reviewed ({Yes)  Lab Results   Component Value Date    WBC 8.3 05/02/2022    HGB 12.6 05/02/2022    HCT 39.5 05/02/2022     05/02/2022    CHOL 237 02/02/2022    TRIG 100 02/02/2022    HDL 63 02/02/2022    ALT 15 05/02/2022    AST 19 05/02/2022     05/02/2022    K  3.4 (L) 05/02/2022    CREATININE 0.75 05/02/2022    BUN 10.1 05/02/2022    CO2 28 05/02/2022    TSH 1.0410 02/02/2022    INR 1.05 05/20/2017    HGBA1C 5.2 02/02/2022       Galina was seen today for ed follow up .    Diagnoses and all orders for this visit:    RUQ abdominal pain  -     US Abdomen Pelvis Doppler Study Limited; Future  -     CBC Auto Differential; Future  -     Comprehensive Metabolic Panel; Future  -     Hemoglobin A1C; Future  -     Lipid Panel; Future  -     TSH; Future  -     T4, Free; Future  -     Urinalysis, Reflex to Urine Culture Urine, Clean Catch    Pituitary microadenoma  -     Ambulatory referral/consult to Neurosurgery; Future    Lumbar back pain  -     X-Ray Lumbar Spine AP And Lateral; Future      Labs today  Lumbar xray  RUQ US  Patient to follow up with GI  Refer again to neurosurgery for microadenoma and back pain  Zofran for nausea        Care Plan/Goals: Reviewed    Goals    None         Follow up: Follow up in about 3 months (around 9/9/2022).

## 2022-07-01 ENCOUNTER — HOSPITAL ENCOUNTER (EMERGENCY)
Facility: HOSPITAL | Age: 52
Discharge: HOME | End: 2022-07-02
Attending: STUDENT IN AN ORGANIZED HEALTH CARE EDUCATION/TRAINING PROGRAM
Payer: MEDICAID

## 2022-07-01 DIAGNOSIS — A08.4 VIRAL GASTROENTERITIS: Primary | ICD-10-CM

## 2022-07-01 LAB
ALBUMIN SERPL-MCNC: 4.7 GM/DL (ref 3.5–5)
ALBUMIN/GLOB SERPL: 1.7 RATIO (ref 1.1–2)
ALP SERPL-CCNC: 56 UNIT/L (ref 40–150)
ALT SERPL-CCNC: 15 UNIT/L (ref 0–55)
AST SERPL-CCNC: 16 UNIT/L (ref 5–34)
BILIRUBIN DIRECT+TOT PNL SERPL-MCNC: 0.6 MG/DL
BUN SERPL-MCNC: 7.1 MG/DL (ref 9.8–20.1)
CALCIUM SERPL-MCNC: 10.7 MG/DL (ref 8.4–10.2)
CHLORIDE SERPL-SCNC: 104 MMOL/L (ref 98–107)
CO2 SERPL-SCNC: 26 MMOL/L (ref 22–29)
CREAT SERPL-MCNC: 0.82 MG/DL (ref 0.55–1.02)
GLOBULIN SER-MCNC: 2.7 GM/DL (ref 2.4–3.5)
GLUCOSE SERPL-MCNC: 110 MG/DL (ref 74–100)
POTASSIUM SERPL-SCNC: 3.8 MMOL/L (ref 3.5–5.1)
PROT SERPL-MCNC: 7.4 GM/DL (ref 6.4–8.3)
SODIUM SERPL-SCNC: 140 MMOL/L (ref 136–145)

## 2022-07-01 PROCEDURE — 36415 COLL VENOUS BLD VENIPUNCTURE: CPT | Performed by: STUDENT IN AN ORGANIZED HEALTH CARE EDUCATION/TRAINING PROGRAM

## 2022-07-01 PROCEDURE — 99285 EMERGENCY DEPT VISIT HI MDM: CPT | Mod: 25

## 2022-07-01 PROCEDURE — 80053 COMPREHEN METABOLIC PANEL: CPT | Performed by: STUDENT IN AN ORGANIZED HEALTH CARE EDUCATION/TRAINING PROGRAM

## 2022-07-01 PROCEDURE — 85025 COMPLETE CBC W/AUTO DIFF WBC: CPT | Performed by: STUDENT IN AN ORGANIZED HEALTH CARE EDUCATION/TRAINING PROGRAM

## 2022-07-01 PROCEDURE — 63600175 PHARM REV CODE 636 W HCPCS: Performed by: STUDENT IN AN ORGANIZED HEALTH CARE EDUCATION/TRAINING PROGRAM

## 2022-07-01 PROCEDURE — 96374 THER/PROPH/DIAG INJ IV PUSH: CPT

## 2022-07-01 PROCEDURE — 81001 URINALYSIS AUTO W/SCOPE: CPT | Performed by: STUDENT IN AN ORGANIZED HEALTH CARE EDUCATION/TRAINING PROGRAM

## 2022-07-01 PROCEDURE — 81025 URINE PREGNANCY TEST: CPT | Performed by: STUDENT IN AN ORGANIZED HEALTH CARE EDUCATION/TRAINING PROGRAM

## 2022-07-01 PROCEDURE — 87636 SARSCOV2 & INF A&B AMP PRB: CPT | Performed by: STUDENT IN AN ORGANIZED HEALTH CARE EDUCATION/TRAINING PROGRAM

## 2022-07-01 RX ORDER — ONDANSETRON 2 MG/ML
4 INJECTION INTRAMUSCULAR; INTRAVENOUS
Status: COMPLETED | OUTPATIENT
Start: 2022-07-01 | End: 2022-07-01

## 2022-07-01 RX ADMIN — ONDANSETRON 4 MG: 2 INJECTION INTRAMUSCULAR; INTRAVENOUS at 11:07

## 2022-07-01 RX ADMIN — SODIUM CHLORIDE, POTASSIUM CHLORIDE, SODIUM LACTATE AND CALCIUM CHLORIDE 1000 ML: 600; 310; 30; 20 INJECTION, SOLUTION INTRAVENOUS at 11:07

## 2022-07-01 NOTE — Clinical Note
"Galina Pitts" Aaron was seen and treated in our emergency department on 7/1/2022.  She may return to work on 07/04/2022.       If you have any questions or concerns, please don't hesitate to call.       RN    "

## 2022-07-02 VITALS
SYSTOLIC BLOOD PRESSURE: 125 MMHG | HEART RATE: 74 BPM | DIASTOLIC BLOOD PRESSURE: 59 MMHG | OXYGEN SATURATION: 100 % | RESPIRATION RATE: 16 BRPM | TEMPERATURE: 98 F

## 2022-07-02 LAB
APPEARANCE UR: CLEAR
B-HCG SERPL QL: NEGATIVE
BACTERIA #/AREA URNS AUTO: ABNORMAL /HPF
BASOPHILS # BLD AUTO: 0.04 X10(3)/MCL (ref 0–0.2)
BASOPHILS NFR BLD AUTO: 0.5 %
BILIRUB UR QL STRIP.AUTO: NEGATIVE MG/DL
COLOR UR AUTO: YELLOW
EOSINOPHIL # BLD AUTO: 0.07 X10(3)/MCL (ref 0–0.9)
EOSINOPHIL NFR BLD AUTO: 0.8 %
ERYTHROCYTE [DISTWIDTH] IN BLOOD BY AUTOMATED COUNT: 13.2 % (ref 11.5–17)
FLUAV AG UPPER RESP QL IA.RAPID: NOT DETECTED
FLUBV AG UPPER RESP QL IA.RAPID: NOT DETECTED
GLUCOSE UR QL STRIP.AUTO: NEGATIVE MG/DL
HCT VFR BLD AUTO: 43.3 % (ref 37–47)
HGB BLD-MCNC: 13.9 GM/DL (ref 12–16)
KETONES UR QL STRIP.AUTO: NEGATIVE MG/DL
LEUKOCYTE ESTERASE UR QL STRIP.AUTO: NEGATIVE UNIT/L
LYMPHOCYTES # BLD AUTO: 2.66 X10(3)/MCL (ref 0.6–4.6)
LYMPHOCYTES NFR BLD AUTO: 30 %
MCH RBC QN AUTO: 31.4 PG (ref 27–31)
MCHC RBC AUTO-ENTMCNC: 32.1 MG/DL (ref 33–36)
MCV RBC AUTO: 98 FL (ref 80–94)
MONOCYTES # BLD AUTO: 0.4 X10(3)/MCL (ref 0.1–1.3)
MONOCYTES NFR BLD AUTO: 4.5 %
NEUTROPHILS # BLD AUTO: 5.6 X10(3)/MCL (ref 2.1–9.2)
NEUTROPHILS NFR BLD AUTO: 63.1 %
NITRITE UR QL STRIP.AUTO: NEGATIVE
PH UR STRIP.AUTO: 5.5 [PH]
PLATELET # BLD AUTO: 296 X10(3)/MCL (ref 130–400)
PMV BLD AUTO: 10.2 FL (ref 7.4–10.4)
PROT UR QL STRIP.AUTO: NEGATIVE MG/DL
RBC # BLD AUTO: 4.42 X10(6)/MCL (ref 4.2–5.4)
RBC #/AREA URNS AUTO: ABNORMAL /HPF
RBC UR QL AUTO: NEGATIVE UNIT/L
SARS-COV-2 RNA RESP QL NAA+PROBE: NOT DETECTED
SP GR UR STRIP.AUTO: 1.01
SQUAMOUS #/AREA URNS AUTO: ABNORMAL /HPF
UROBILINOGEN UR STRIP-ACNC: 0.2 MG/DL
WBC # SPEC AUTO: 8.9 X10(3)/MCL (ref 4.5–11.5)
WBC #/AREA URNS AUTO: ABNORMAL /HPF

## 2022-07-02 PROCEDURE — 25500020 PHARM REV CODE 255: Performed by: STUDENT IN AN ORGANIZED HEALTH CARE EDUCATION/TRAINING PROGRAM

## 2022-07-02 PROCEDURE — 25000003 PHARM REV CODE 250: Performed by: STUDENT IN AN ORGANIZED HEALTH CARE EDUCATION/TRAINING PROGRAM

## 2022-07-02 RX ORDER — HYDROCODONE BITARTRATE AND ACETAMINOPHEN 5; 325 MG/1; MG/1
1 TABLET ORAL
Status: COMPLETED | OUTPATIENT
Start: 2022-07-02 | End: 2022-07-02

## 2022-07-02 RX ORDER — ONDANSETRON 4 MG/1
4 TABLET, ORALLY DISINTEGRATING ORAL EVERY 8 HOURS PRN
Qty: 10 TABLET | Refills: 0 | Status: SHIPPED | OUTPATIENT
Start: 2022-07-02 | End: 2022-07-07

## 2022-07-02 RX ORDER — MORPHINE SULFATE 4 MG/ML
4 INJECTION, SOLUTION INTRAMUSCULAR; INTRAVENOUS
Status: DISCONTINUED | OUTPATIENT
Start: 2022-07-02 | End: 2022-07-02

## 2022-07-02 RX ADMIN — IOPAMIDOL 100 ML: 755 INJECTION, SOLUTION INTRAVENOUS at 12:07

## 2022-07-02 RX ADMIN — HYDROCODONE BITARTRATE AND ACETAMINOPHEN 1 TABLET: 5; 325 TABLET ORAL at 01:07

## 2022-07-02 NOTE — ED PROVIDER NOTES
Encounter Date: 7/1/2022       History     Chief Complaint   Patient presents with    Diarrhea     Pt c/o nausea, diarrhea, right lower quadrant pain and weakness x 3 days     Patient is a 51-year-old  female past medical history of colorectal cancer status post resection 14 years ago presented to the ER today due to acute onset, nausea, vomiting, nonbloody nonbilious emesis x3 episodes, diarrhea.  Patient denies any hematochezia melena.  Patient states she had 3 episodes of diarrhea as well.  Patient states she has also had some chills.  Patient denies any sick contacts.  Patient denies any fever, chills, cough, congestion, chest pain, shortness of breath, abdominal pain.  Patient denies any dysuria vaginal discharge.        Review of patient's allergies indicates:   Allergen Reactions    Ibuprofen Nausea And Vomiting     Burns pt's stomach     No past medical history on file.  Past Surgical History:   Procedure Laterality Date    COLECTOMY  2017    COLONOSCOPY  01/27/2022    NECK SURGERY       No family history on file.  Social History     Tobacco Use    Smoking status: Current Every Day Smoker     Packs/day: 1.00     Types: Cigarettes    Smokeless tobacco: Never Used   Substance Use Topics    Alcohol use: Not Currently    Drug use: Never     Review of Systems   Constitutional: Positive for chills. Negative for fatigue and fever.   HENT: Negative for congestion, sore throat and trouble swallowing.    Eyes: Negative for pain and visual disturbance.   Respiratory: Negative for cough, shortness of breath and wheezing.    Cardiovascular: Negative for chest pain and palpitations.   Gastrointestinal: Positive for diarrhea, nausea and vomiting. Negative for abdominal pain, blood in stool and constipation.   Genitourinary: Negative for dysuria and hematuria.   Musculoskeletal: Negative for back pain and myalgias.   Skin: Negative for rash and wound.   Neurological: Negative for seizures, syncope  and headaches.   Psychiatric/Behavioral: Negative for confusion. The patient is not nervous/anxious.        Physical Exam     Initial Vitals [07/01/22 2203]   BP Pulse Resp Temp SpO2   125/84 74 18 98.2 °F (36.8 °C) 100 %      MAP       --         Physical Exam    Nursing note and vitals reviewed.  Constitutional: She appears well-developed and well-nourished. She is not diaphoretic. No distress.   HENT:   Head: Normocephalic.   Right Ear: External ear normal.   Left Ear: External ear normal.   Nose: Nose normal.   Eyes: Conjunctivae and EOM are normal. Right eye exhibits no discharge. Left eye exhibits no discharge. No scleral icterus.   Neck:   Normal range of motion.  Cardiovascular: Normal rate, regular rhythm, normal heart sounds and intact distal pulses. Exam reveals no gallop and no friction rub.    No murmur heard.  Pulmonary/Chest: Breath sounds normal. No stridor. No respiratory distress. She has no wheezes. She has no rhonchi. She has no rales.   Abdominal: Abdomen is soft. She exhibits no distension. There is no abdominal tenderness.   Hyperactive bowel signs.  Negative Walker sign.  Slight tenderness to palpation in the right lower quadrant. There is no rebound and no guarding.   Musculoskeletal:         General: No tenderness or edema. Normal range of motion.      Cervical back: Normal range of motion.     Neurological: She is alert. No cranial nerve deficit.   Skin: Skin is warm. No rash noted. No erythema.   Psychiatric: She has a normal mood and affect. Her behavior is normal.         ED Course   Procedures  Labs Reviewed   COMPREHENSIVE METABOLIC PANEL - Abnormal; Notable for the following components:       Result Value    Glucose Level 110 (*)     Blood Urea Nitrogen 7.1 (*)     Calcium Level Total 10.7 (*)     All other components within normal limits   CBC WITH DIFFERENTIAL - Abnormal; Notable for the following components:    MCV 98.0 (*)     MCH 31.4 (*)     MCHC 32.1 (*)     All other  components within normal limits   URINALYSIS, MICROSCOPIC - Abnormal; Notable for the following components:    Bacteria, UA Few (*)     Squamous Epithelial Cells, UA Moderate (*)     All other components within normal limits   PREGNANCY TEST, URINE RAPID - Normal   COVID/FLU A&B PCR - Normal   URINALYSIS, REFLEX TO URINE CULTURE   CBC W/ AUTO DIFFERENTIAL    Narrative:     The following orders were created for panel order CBC Auto Differential.  Procedure                               Abnormality         Status                     ---------                               -----------         ------                     CBC with Differential[[id number]]        Abnormal            Final result                 Please view results for these tests on the individual orders.   HCG QUALITATIVE URINE          Imaging Results          CT Abdomen Pelvis With Contrast (Preliminary result)  Result time 07/02/22 00:18:05    Preliminary result by Dell Bowman MD (07/02/22 00:18:05)                 Narrative:    START OF REPORT:  Technique: CT of the abdomen and pelvis was performed with axial images as well as sagittal and coronal reconstruction images with intravenous contrast.    Comparison: None available.    Clinical History: Pt c/o nausea, diarrhea, right lower quadrant pain and weakness x 3 days).    Dosage Information: Automated Exposure Control was utilized.    Findings:  Lines and Tubes: None.  Thorax:  Lungs: The visualized lung bases appear unremarkable.  Pleura: No effusions or thickening. No pneumothorax is seen in the visualized lung bases.  Abdomen:  Abdominal Wall: No abdominal wall pathology is seen.  Liver: The liver appears unremarkable.  Biliary System: No extrahepatic biliary duct dilatation is seen.  Gallbladder: The gallbladder appears unremarkable.  Pancreas: The pancreas appears unremarkable.  Spleen: The spleen appears unremarkable.  Adrenals: The adrenal glands appear unremarkable.  Kidneys: The right  kidney appears unremarkable with no stones cysts masses or hydronephrosis. Note of tiny scattered calcifications on the left which may reflect tiny nonobstructive stones. The left kidney otherwise appears unremarkable with no cysts masses or hydronephrosis identified.  Aorta: There is moderate calcification of the abdominal aorta and its branches.  IVC: Unremarkable.  Bowel: Surgical clips are seen in the left upper quadrant and across the rectosigmoid colon which reflect prior surgical history.  Esophagus: The visualized esophagus appears unremarkable.  Stomach: The stomach appears unremarkable.  Duodenum: Unremarkable appearing duodenum.  Small Bowel: The small bowel appears unremarkable.  Colon: The visualized colon appears unremarkable.  Appendix: The appendix is not identified but no inflammatory changes are seen in the right lower quadrant to suggest appendicitis.  Peritoneum: No intraperitoneal free air or ascites is seen.    Pelvis:  Bladder: The bladder appears unremarkable.  Female:  Uterus: The uterus appears unremarkable.  Ovaries: No adnexal masses are seen.    Bony structures:  Dorsal Spine: There is spondylosis of the visualized dorsal spine.  Bony Pelvis: The visualized bony structures of the pelvis appear unremarkable.      Impression:  1. No acute intraabdominal or pelvic solid organ or bowel pathology identified. Details and other findings as discussed above.                                   Medications   lactated ringers bolus 1,000 mL (1,000 mLs Intravenous New Bag 7/1/22 5905)   ondansetron injection 4 mg (4 mg Intravenous Given 7/1/22 2316)   iopamidoL (ISOVUE-370) injection 100 mL (100 mLs Intravenous Given 7/2/22 0028)     Medical Decision Making:   Initial Assessment:   Overall well-appearing 51-year-old female  Differential Diagnosis:   Appendicitis, UTI, viral gastroenteritis  ED Management:  Vital signs stable patient appears in no acute distress  Slight tenderness palpation in the  right lower quadrant but no guarding or rebound tenderness  Basic labs ordered  IV fluids given  Zofran given  Patient drinking water in the room without nausea  CT abdomen pelvis showed no acute intra-abdominal etiology  Patient was able to tolerate liquids in the room without difficulty  Zofran sent to pharmacy for presumed viral gastroenteritis  Return precautions discussed follow-up PCP recommended                      Clinical Impression:   Final diagnoses:  [A08.4] Viral gastroenteritis (Primary)          ED Disposition Condition    Discharge Stable        ED Prescriptions     Medication Sig Dispense Start Date End Date Auth. Provider    ondansetron (ZOFRAN-ODT) 4 MG TbDL Take 1 tablet (4 mg total) by mouth every 8 (eight) hours as needed (nausea). 10 tablet 7/2/2022 7/7/2022 Iker Mariano MD        Follow-up Information     Follow up With Specialties Details Why Contact Info    [facility] - Emergency Dept Emergency Medicine  If symptoms worsen [address] [place] [id number] [phone]           Iker Mariano MD  07/02/22 0127

## 2022-08-30 PROBLEM — E23.6 PITUITARY MASS: Status: ACTIVE | Noted: 2022-08-30

## 2022-09-10 ENCOUNTER — HOSPITAL ENCOUNTER (EMERGENCY)
Facility: HOSPITAL | Age: 52
Discharge: HOME | End: 2022-09-10
Attending: SPECIALIST
Payer: MEDICAID

## 2022-09-10 VITALS
DIASTOLIC BLOOD PRESSURE: 84 MMHG | RESPIRATION RATE: 16 BRPM | WEIGHT: 180 LBS | BODY MASS INDEX: 27.28 KG/M2 | HEIGHT: 68 IN | OXYGEN SATURATION: 100 % | SYSTOLIC BLOOD PRESSURE: 129 MMHG | TEMPERATURE: 98 F | HEART RATE: 81 BPM

## 2022-09-10 DIAGNOSIS — M54.32 SCIATICA OF LEFT SIDE: Primary | ICD-10-CM

## 2022-09-10 DIAGNOSIS — S39.012A LUMBAR STRAIN, INITIAL ENCOUNTER: ICD-10-CM

## 2022-09-10 LAB
ALBUMIN SERPL-MCNC: 4.5 GM/DL (ref 3.5–5)
ALBUMIN/GLOB SERPL: 1 RATIO (ref 1.1–2)
ALP SERPL-CCNC: 66 UNIT/L (ref 40–150)
ALT SERPL-CCNC: 15 UNIT/L (ref 0–55)
APPEARANCE UR: CLEAR
AST SERPL-CCNC: 22 UNIT/L (ref 5–34)
B-HCG SERPL QL: NEGATIVE
BACTERIA #/AREA URNS AUTO: NORMAL /HPF
BASOPHILS # BLD AUTO: 0.02 X10(3)/MCL (ref 0–0.2)
BASOPHILS NFR BLD AUTO: 0.2 %
BILIRUB UR QL STRIP.AUTO: NEGATIVE MG/DL
BILIRUBIN DIRECT+TOT PNL SERPL-MCNC: 0.2 MG/DL
BUN SERPL-MCNC: 11.3 MG/DL (ref 9.8–20.1)
CALCIUM SERPL-MCNC: 10.2 MG/DL (ref 8.4–10.2)
CHLORIDE SERPL-SCNC: 107 MMOL/L (ref 98–107)
CO2 SERPL-SCNC: 15 MMOL/L (ref 22–29)
COLOR UR AUTO: ABNORMAL
CREAT SERPL-MCNC: 0.85 MG/DL (ref 0.55–1.02)
EOSINOPHIL # BLD AUTO: 0.03 X10(3)/MCL (ref 0–0.9)
EOSINOPHIL NFR BLD AUTO: 0.3 %
ERYTHROCYTE [DISTWIDTH] IN BLOOD BY AUTOMATED COUNT: 12.6 % (ref 11.5–17)
GFR SERPLBLD CREATININE-BSD FMLA CKD-EPI: >60 MLS/MIN/1.73/M2
GLOBULIN SER-MCNC: 4.3 GM/DL (ref 2.4–3.5)
GLUCOSE SERPL-MCNC: 109 MG/DL (ref 74–100)
GLUCOSE UR QL STRIP.AUTO: 100 MG/DL
HCT VFR BLD AUTO: 43.9 % (ref 37–47)
HGB BLD-MCNC: 14.6 GM/DL (ref 12–16)
IMM GRANULOCYTES # BLD AUTO: 0.07 X10(3)/MCL (ref 0–0.04)
IMM GRANULOCYTES NFR BLD AUTO: 0.8 %
KETONES UR QL STRIP.AUTO: NEGATIVE MG/DL
LEUKOCYTE ESTERASE UR QL STRIP.AUTO: NEGATIVE UNIT/L
LYMPHOCYTES # BLD AUTO: 2.63 X10(3)/MCL (ref 0.6–4.6)
LYMPHOCYTES NFR BLD AUTO: 29.4 %
MCH RBC QN AUTO: 31.3 PG (ref 27–31)
MCHC RBC AUTO-ENTMCNC: 33.3 MG/DL (ref 33–36)
MCV RBC AUTO: 94.2 FL (ref 80–94)
MONOCYTES # BLD AUTO: 0.55 X10(3)/MCL (ref 0.1–1.3)
MONOCYTES NFR BLD AUTO: 6.1 %
NEUTROPHILS # BLD AUTO: 5.7 X10(3)/MCL (ref 2.1–9.2)
NEUTROPHILS NFR BLD AUTO: 63.2 %
NITRITE UR QL STRIP.AUTO: NEGATIVE
PH UR STRIP.AUTO: 5.5 [PH]
PLATELET # BLD AUTO: 334 X10(3)/MCL (ref 130–400)
PMV BLD AUTO: 9.5 FL (ref 7.4–10.4)
POTASSIUM SERPL-SCNC: 4.2 MMOL/L (ref 3.5–5.1)
PROT SERPL-MCNC: 8.8 GM/DL (ref 6.4–8.3)
PROT UR QL STRIP.AUTO: NEGATIVE MG/DL
RBC # BLD AUTO: 4.66 X10(6)/MCL (ref 4.2–5.4)
RBC #/AREA URNS AUTO: NORMAL /HPF
RBC UR QL AUTO: ABNORMAL UNIT/L
SODIUM SERPL-SCNC: 138 MMOL/L (ref 136–145)
SP GR UR STRIP.AUTO: <=1.005
SQUAMOUS #/AREA URNS AUTO: NORMAL /HPF
UROBILINOGEN UR STRIP-ACNC: 0.2 MG/DL
WBC # SPEC AUTO: 9 X10(3)/MCL (ref 4.5–11.5)
WBC #/AREA URNS AUTO: NORMAL /HPF

## 2022-09-10 PROCEDURE — 96361 HYDRATE IV INFUSION ADD-ON: CPT

## 2022-09-10 PROCEDURE — 99285 EMERGENCY DEPT VISIT HI MDM: CPT | Mod: 25

## 2022-09-10 PROCEDURE — 96374 THER/PROPH/DIAG INJ IV PUSH: CPT

## 2022-09-10 PROCEDURE — 25000003 PHARM REV CODE 250: Performed by: SPECIALIST

## 2022-09-10 PROCEDURE — 81025 URINE PREGNANCY TEST: CPT | Performed by: SPECIALIST

## 2022-09-10 PROCEDURE — 81001 URINALYSIS AUTO W/SCOPE: CPT | Performed by: SPECIALIST

## 2022-09-10 PROCEDURE — 63600175 PHARM REV CODE 636 W HCPCS: Performed by: SPECIALIST

## 2022-09-10 PROCEDURE — 80053 COMPREHEN METABOLIC PANEL: CPT | Performed by: SPECIALIST

## 2022-09-10 PROCEDURE — 85025 COMPLETE CBC W/AUTO DIFF WBC: CPT | Performed by: SPECIALIST

## 2022-09-10 RX ORDER — CYCLOBENZAPRINE HCL 10 MG
10 TABLET ORAL 3 TIMES DAILY PRN
Qty: 15 TABLET | Refills: 0 | Status: SHIPPED | OUTPATIENT
Start: 2022-09-10 | End: 2022-09-15

## 2022-09-10 RX ORDER — CYCLOBENZAPRINE HCL 10 MG
10 TABLET ORAL
Status: COMPLETED | OUTPATIENT
Start: 2022-09-10 | End: 2022-09-10

## 2022-09-10 RX ORDER — DICLOFENAC SODIUM 50 MG/1
50 TABLET, DELAYED RELEASE ORAL 3 TIMES DAILY PRN
Qty: 30 TABLET | Refills: 0 | Status: SHIPPED | OUTPATIENT
Start: 2022-09-10 | End: 2022-11-23

## 2022-09-10 RX ORDER — KETOROLAC TROMETHAMINE 30 MG/ML
30 INJECTION, SOLUTION INTRAMUSCULAR; INTRAVENOUS
Status: COMPLETED | OUTPATIENT
Start: 2022-09-10 | End: 2022-09-10

## 2022-09-10 RX ADMIN — CYCLOBENZAPRINE 10 MG: 10 TABLET, FILM COATED ORAL at 01:09

## 2022-09-10 RX ADMIN — KETOROLAC TROMETHAMINE 30 MG: 30 INJECTION, SOLUTION INTRAMUSCULAR; INTRAVENOUS at 01:09

## 2022-09-10 RX ADMIN — SODIUM CHLORIDE 1000 ML: 9 INJECTION, SOLUTION INTRAVENOUS at 01:09

## 2022-09-10 NOTE — ED PROVIDER NOTES
Encounter Date: 9/10/2022       History     Chief Complaint   Patient presents with    Back Pain     Pt states back pain started while working last night. Pt has chronic back pain but states this is much worse. Leg cramps started on the way to ER.        Patient with lower back pain radiating down the back of her left leg that began yesterday and now with cramping of her fingers and toes; she notes chronic back pain but states this is worse; no focal deficits, no dysuria, no fever, no diarrhea or constipation    The history is provided by the patient.   Review of patient's allergies indicates:   Allergen Reactions    Ibuprofen Nausea And Vomiting     Burns pt's stomach     Past Medical History:   Diagnosis Date    Pituitary mass 8/30/2022     Past Surgical History:   Procedure Laterality Date    COLECTOMY  2017    COLONOSCOPY  01/27/2022    NECK SURGERY       History reviewed. No pertinent family history.  Social History     Tobacco Use    Smoking status: Every Day     Packs/day: 1.00     Types: Cigarettes    Smokeless tobacco: Never   Substance Use Topics    Alcohol use: Not Currently    Drug use: Never     Review of Systems   Constitutional: Negative.    HENT: Negative.     Respiratory: Negative.     Cardiovascular: Negative.    Gastrointestinal:  Positive for abdominal pain.   Musculoskeletal:  Positive for arthralgias and back pain.   Skin: Negative.    Neurological: Negative.    All other systems reviewed and are negative.    Physical Exam     Initial Vitals [09/10/22 0054]   BP Pulse Resp Temp SpO2   (!) 155/89 (!) 136 (!) 36 98.1 °F (36.7 °C) 100 %      MAP       --         Physical Exam    Nursing note and vitals reviewed.  Constitutional: She appears well-developed and well-nourished.   HENT:   Head: Normocephalic and atraumatic.   Eyes: EOM are normal. Pupils are equal, round, and reactive to light.   Neck: Neck supple.   Normal range of motion.  Cardiovascular:  Normal rate, regular rhythm, normal heart  sounds and intact distal pulses.           Pulmonary/Chest: Breath sounds normal. No respiratory distress.   Abdominal: Abdomen is soft. Bowel sounds are normal. She exhibits no distension. There is no abdominal tenderness.   Musculoskeletal:         General: Normal range of motion.      Cervical back: Normal range of motion and neck supple.     Neurological: She is alert and oriented to person, place, and time. She has normal strength.   Bilateral lumbar paraspinous tenderness, no spinous tenderness, no swelling    Skin: Skin is warm and dry.       ED Course   Procedures  Labs Reviewed   COMPREHENSIVE METABOLIC PANEL - Abnormal; Notable for the following components:       Result Value    Carbon Dioxide 15 (*)     Glucose Level 109 (*)     Protein Total 8.8 (*)     Globulin 4.3 (*)     Albumin/Globulin Ratio 1.0 (*)     All other components within normal limits   CBC WITH DIFFERENTIAL - Abnormal; Notable for the following components:    MCV 94.2 (*)     MCH 31.3 (*)     IG# 0.07 (*)     All other components within normal limits   URINALYSIS, REFLEX TO URINE CULTURE - Abnormal; Notable for the following components:    Color, UA Straw (*)     Glucose,  (*)     Blood, UA Trace-Lysed (*)     All other components within normal limits   PREGNANCY TEST, URINE RAPID - Normal   URINALYSIS, MICROSCOPIC - Normal   CBC W/ AUTO DIFFERENTIAL    Narrative:     The following orders were created for panel order CBC auto differential.  Procedure                               Abnormality         Status                     ---------                               -----------         ------                     CBC with Differential[[id number]]        Abnormal            Final result                 Please view results for these tests on the individual orders.          Imaging Results              CT Lumbar Spine Without Contrast (Preliminary result)  Result time 09/10/22 02:43:48      Preliminary result by Dell Bowman MD  (09/10/22 02:43:48)                   Narrative:    START OF REPORT:  Technique: CT of the lumbar spine was performed without intravenous contrast with direct axial as well as sagittal and coronal reconstruction images.    Comparison: None.    Clinical history: (Pt states back pain started while working last night. Pt has chronic back pain but states this is much worse. Leg cramps started on the way to ER.    Findings:  Mineralization: The bony mineralization is within normal limits.  Congenital: None.  Bone alignment: Unremarkable with no significant listhesis.  Curvature: The lumbar lordosis is maintained.  Bone and bone marrow: The vertebral body heights are maintained.  Intervertebral disc spaces: Moderately decreased disc height is seen at L5-S1. There is air pockets within the intervertebral disc.  Osteophytes: There are degenerative marginal osteophytes at T12 down through S1.  Facet degenerative changes: Moderate facet degenerative changes are seen at L4-L5 L5-S1.  Spinal canal:  Fractures: No acute fracture dislocation or subluxation is seen.  Orthopedic Hardware: None.  Vertebral Fusion: None.  Visualized sacrum: Normal.  Miscellaneous: No other significant findings.      Impression:  1. No acute fracture dislocation or subluxation is seen.  2. Degenerative changes and other findings as above. Details as above.                          Preliminary result by Interface, Rad Results In (09/10/22 02:43:48)                   Narrative:    START OF REPORT:  Technique: CT of the lumbar spine was performed without intravenous contrast with direct axial as well as sagittal and coronal reconstruction images.    Comparison: None.    Clinical history: (Pt states back pain started while working last night. Pt has chronic back pain but states this is much worse. Leg cramps started on the way to ER.    Findings:  Mineralization: The bony mineralization is within normal limits.  Congenital: None.  Bone alignment:  "Unremarkable with no significant listhesis.  Curvature: The lumbar lordosis is maintained.  Bone and bone marrow: The vertebral body heights are maintained.  Intervertebral disc spaces: Moderately decreased disc height is seen at L5-S1. There is air pockets within the intervertebral disc.  Osteophytes: There are degenerative marginal osteophytes at T12 down through S1.  Facet degenerative changes: Moderate facet degenerative changes are seen at L4-L5 L5-S1.  Spinal canal:  Fractures: No acute fracture dislocation or subluxation is seen.  Orthopedic Hardware: None.  Vertebral Fusion: None.  Visualized sacrum: Normal.  Miscellaneous: No other significant findings.      Impression:  1. No acute fracture dislocation or subluxation is seen.  2. Degenerative changes and other findings as above. Details as above.                                         Medications   sodium chloride 0.9% bolus 1,000 mL (1,000 mLs Intravenous New Bag 9/10/22 0133)   ketorolac injection 30 mg (30 mg Intravenous Given 9/10/22 0132)   cyclobenzaprine tablet 10 mg (10 mg Oral Given 9/10/22 0132)                        Patient Vitals for the past 24 hrs:   BP Temp Temp src Pulse Resp SpO2 Height Weight   09/10/22 0303 129/84 98.2 °F (36.8 °C) Oral 81 16 100 % -- --   09/10/22 0200 122/72 -- -- 88 18 100 % -- --   09/10/22 0105 126/89 -- -- (!) 111 (!) 22 -- -- --   09/10/22 0054 (!) 155/89 98.1 °F (36.7 °C) Oral (!) 136 (!) 36 100 % 5' 8" (1.727 m) 81.6 kg (180 lb)   Much improved    Clinical Impression:   Final diagnoses:  [M54.32] Sciatica of left side (Primary)  [S39.012A] Lumbar strain, initial encounter      ED Disposition Condition    Discharge Stable          ED Prescriptions       Medication Sig Dispense Start Date End Date Auth. Provider    diclofenac (VOLTAREN) 50 MG EC tablet Take 1 tablet (50 mg total) by mouth 3 (three) times daily as needed (pain). 30 tablet 9/10/2022 -- Colton Srinivasan MD    cyclobenzaprine (FLEXERIL) 10 MG tablet " Take 1 tablet (10 mg total) by mouth 3 (three) times daily as needed for Muscle spasms. 15 tablet 9/10/2022 9/15/2022 Colton Srinivasan MD          Follow-up Information       Follow up With Specialties Details Why Contact Info    Tina Scott MD Family Medicine In 1 week  [address] [facility] [address] [phone]      [facility] - Emergency Dept Emergency Medicine  As needed [address] [facility] [address] [phone]             Colton Srinivasan MD  09/10/22 0325

## 2022-09-16 ENCOUNTER — HISTORICAL (OUTPATIENT)
Dept: ADMINISTRATIVE | Facility: HOSPITAL | Age: 52
End: 2022-09-16
Payer: MEDICAID

## 2022-09-23 ENCOUNTER — OFFICE VISIT (OUTPATIENT)
Dept: FAMILY MEDICINE | Facility: CLINIC | Age: 52
End: 2022-09-23
Payer: MEDICAID

## 2022-09-23 VITALS
WEIGHT: 159.63 LBS | HEART RATE: 80 BPM | RESPIRATION RATE: 18 BRPM | TEMPERATURE: 98 F | HEIGHT: 69 IN | SYSTOLIC BLOOD PRESSURE: 119 MMHG | BODY MASS INDEX: 23.64 KG/M2 | DIASTOLIC BLOOD PRESSURE: 81 MMHG

## 2022-09-23 DIAGNOSIS — Z72.0 TOBACCO USE: ICD-10-CM

## 2022-09-23 DIAGNOSIS — M19.90 OSTEOARTHRITIS, UNSPECIFIED OSTEOARTHRITIS TYPE, UNSPECIFIED SITE: ICD-10-CM

## 2022-09-23 DIAGNOSIS — G43.009 MIGRAINE WITHOUT AURA AND WITHOUT STATUS MIGRAINOSUS, NOT INTRACTABLE: Primary | ICD-10-CM

## 2022-09-23 DIAGNOSIS — M25.512 CHRONIC LEFT SHOULDER PAIN: ICD-10-CM

## 2022-09-23 DIAGNOSIS — M54.30 SCIATICA, UNSPECIFIED LATERALITY: ICD-10-CM

## 2022-09-23 DIAGNOSIS — G89.29 CHRONIC LEFT SHOULDER PAIN: ICD-10-CM

## 2022-09-23 PROCEDURE — 99214 OFFICE O/P EST MOD 30 MIN: CPT | Mod: S$PBB,,, | Performed by: FAMILY MEDICINE

## 2022-09-23 PROCEDURE — 3079F DIAST BP 80-89 MM HG: CPT | Mod: CPTII,,, | Performed by: FAMILY MEDICINE

## 2022-09-23 PROCEDURE — 3079F PR MOST RECENT DIASTOLIC BLOOD PRESSURE 80-89 MM HG: ICD-10-PCS | Mod: CPTII,,, | Performed by: FAMILY MEDICINE

## 2022-09-23 PROCEDURE — 3074F PR MOST RECENT SYSTOLIC BLOOD PRESSURE < 130 MM HG: ICD-10-PCS | Mod: CPTII,,, | Performed by: FAMILY MEDICINE

## 2022-09-23 PROCEDURE — 1159F MED LIST DOCD IN RCRD: CPT | Mod: CPTII,,, | Performed by: FAMILY MEDICINE

## 2022-09-23 PROCEDURE — 3008F BODY MASS INDEX DOCD: CPT | Mod: CPTII,,, | Performed by: FAMILY MEDICINE

## 2022-09-23 PROCEDURE — 99214 OFFICE O/P EST MOD 30 MIN: CPT | Mod: PBBFAC | Performed by: FAMILY MEDICINE

## 2022-09-23 PROCEDURE — 3074F SYST BP LT 130 MM HG: CPT | Mod: CPTII,,, | Performed by: FAMILY MEDICINE

## 2022-09-23 PROCEDURE — 1159F PR MEDICATION LIST DOCUMENTED IN MEDICAL RECORD: ICD-10-PCS | Mod: CPTII,,, | Performed by: FAMILY MEDICINE

## 2022-09-23 PROCEDURE — 3008F PR BODY MASS INDEX (BMI) DOCUMENTED: ICD-10-PCS | Mod: CPTII,,, | Performed by: FAMILY MEDICINE

## 2022-09-23 PROCEDURE — 99214 PR OFFICE/OUTPT VISIT, EST, LEVL IV, 30-39 MIN: ICD-10-PCS | Mod: S$PBB,,, | Performed by: FAMILY MEDICINE

## 2022-09-23 RX ORDER — BUSPIRONE HYDROCHLORIDE 10 MG/1
20 TABLET ORAL 2 TIMES DAILY
COMMUNITY
Start: 2022-07-01 | End: 2023-01-18 | Stop reason: ALTCHOICE

## 2022-09-23 RX ORDER — DICYCLOMINE HYDROCHLORIDE 20 MG/1
20 TABLET ORAL 4 TIMES DAILY PRN
COMMUNITY
Start: 2022-01-14 | End: 2023-03-09

## 2022-09-23 RX ORDER — TIZANIDINE 4 MG/1
4 TABLET ORAL EVERY 8 HOURS
Qty: 30 TABLET | Refills: 1 | Status: SHIPPED | OUTPATIENT
Start: 2022-09-23 | End: 2022-10-03

## 2022-09-23 RX ORDER — METHYLPREDNISOLONE 4 MG/1
TABLET ORAL
Qty: 21 EACH | Refills: 0 | Status: SHIPPED | OUTPATIENT
Start: 2022-09-23 | End: 2022-10-14

## 2022-09-23 NOTE — PROGRESS NOTES
Galina Walker  09/23/2022  [id number]          Visit Type:a scheduled routine follow-up visit    Chief Complaint:  Chief Complaint   Patient presents with    Follow-up       History of Present Illness:  51 y F presents for follow up  Patient was seen in ED on 9/10/22 with back pain. Diagnosed with sciatica.   Given Flexeril, Diclofenac with minimal improvement. CT in EF with DJD  Also has left shoulder pain, chronic issue, > 10 years. Did PT years ago. Made it worse.  Also has migraine HA  Last visit 2/2022 with neurology; was being evaluated for demyelinating disease. Unsure when next appt is.  Had neurosurgery visit few weeks ago virtually, call disconnected. Unsure about follow up appt.       History:  Past Medical History:   Diagnosis Date    Pituitary mass 8/30/2022     Past Surgical History:   Procedure Laterality Date    COLECTOMY  2017    COLONOSCOPY  01/27/2022    NECK SURGERY       History reviewed. No pertinent family history.  Social History     Socioeconomic History    Marital status: Single   Tobacco Use    Smoking status: Every Day     Packs/day: 1.00     Types: Cigarettes    Smokeless tobacco: Never   Substance and Sexual Activity    Alcohol use: Not Currently    Drug use: Never     Patient Active Problem List   Diagnosis    Pituitary mass     Review of patient's allergies indicates:   Allergen Reactions    Ibuprofen Nausea And Vomiting     Burns pt's stomach         Medications:  Current Outpatient Medications on File Prior to Visit   Medication Sig Dispense Refill    busPIRone (BUSPAR) 10 MG tablet Take 20 mg by mouth 2 (two) times daily.      diclofenac (VOLTAREN) 50 MG EC tablet Take 1 tablet (50 mg total) by mouth 3 (three) times daily as needed (pain). 30 tablet 0    dicyclomine (BENTYL) 20 mg tablet Take 20 mg by mouth 4 (four) times daily as needed.      [DISCONTINUED] busPIRone (BUSPAR) 15 MG tablet Take 1 tablet by mouth 2 (two) times daily.      ondansetron (ZOFRAN-ODT) 4 MG TbDL Take  1 tablet (4 mg total) by mouth every 6 (six) hours as needed. (Patient not taking: Reported on 9/23/2022) 20 tablet 0     No current facility-administered medications on file prior to visit.       Medications have been reviewed and reconciled with patient at this visit.    Adverse reactions to current medications reviewed with patient..      Exam:  Wt Readings from Last 3 Encounters:   09/23/22 72.4 kg (159 lb 9.8 oz)   09/10/22 81.6 kg (180 lb)   06/09/22 76.5 kg (168 lb 10.4 oz)     Temp Readings from Last 3 Encounters:   09/23/22 97.9 °F (36.6 °C)   09/10/22 98.2 °F (36.8 °C) (Oral)   07/02/22 98.1 °F (36.7 °C)     BP Readings from Last 3 Encounters:   09/23/22 119/81   09/10/22 129/84   07/02/22 (!) 125/59     Pulse Readings from Last 3 Encounters:   09/23/22 80   09/10/22 81   07/01/22 74     Body mass index is 23.57 kg/m².      ROS:  See HPI for details      All Other ROS: Negative except as stated in HPI.    PE:  General: Alert and oriented, No acute distress.  Head: Normocephalic, Atraumatic.  Eye: Pupils are equal, round and reactive to light, Extraocular movements are intact, Sclera non-icteric.  Ears/Nose/Throat: Normal, Mucosa moist,Clear.  Neck/Thyroid: Supple, Non-tender, No carotid bruit,  Respiratory: Clear to auscultation bilaterally; No wheezes, rales or rhonchi,Non-labored respirations, Symmetrical chest wall expansion.  Cardiovascular: Regular rate and rhythm, S1/S2 normal, No murmurs, rubs or gallops.  Gastrointestinal: Soft, Non-tender, Non-distended, Normal bowel sounds, No palpable organomegaly.  Musculoskeletal: Normal range of motion. Limited ROM left shoulder  Integumentary: Warm, Dry, Intact, No suspicious lesions or rashes.  Extremities: No clubbing, cyanosis or edema  Neurologic: No focal deficits,  Motor strength normal upper and lower extremities, Sensory exam intact.  Psychiatric: Normal interaction, Coherent speech, Euthymic mood, Appropriate affect    Laboratory Reviewed  ({Yes)  Lab Results   Component Value Date    WBC 9.0 09/10/2022    HGB 14.6 09/10/2022    HCT 43.9 09/10/2022     09/10/2022    CHOL 237 02/02/2022    TRIG 100 02/02/2022    HDL 63 02/02/2022    ALT 15 09/10/2022    AST 22 09/10/2022     09/10/2022    K 4.2 09/10/2022    CREATININE 0.85 09/10/2022    BUN 11.3 09/10/2022    CO2 15 (L) 09/10/2022    TSH 1.0410 02/02/2022    INR 1.05 05/20/2017    HGBA1C 5.2 02/02/2022       Galina was seen today for follow-up.    Diagnoses and all orders for this visit:    Migraine without aura and without status migrainosus, not intractable  -     Ambulatory referral/consult to Pain Clinic; Future    Tobacco use    Chronic left shoulder pain  -     Ambulatory referral/consult to Orthopedics; Future    Sciatica, unspecified laterality  -     Ambulatory referral/consult to Pain Clinic; Future    Osteoarthritis, unspecified osteoarthritis type, unspecified site  -     Ambulatory referral/consult to Pain Clinic; Future      Encouraged patient to reschedule appt with neurosurgery and neurology  Not interested in PT  Referred to ortho for left shoulder pain  Referred to pain management  Will send prednisone and 20mg and Tizanidine to pharmacy  Declined triptans for migraines due to previous side effects experienced  Medrol dosepak sent instead of prednisone    Care Plan/Goals: Reviewed    Goals    None         Follow up: No follow-ups on file.

## 2022-11-11 ENCOUNTER — HOSPITAL ENCOUNTER (OUTPATIENT)
Dept: RADIOLOGY | Facility: HOSPITAL | Age: 52
Discharge: HOME | End: 2022-11-11
Attending: STUDENT IN AN ORGANIZED HEALTH CARE EDUCATION/TRAINING PROGRAM
Payer: MEDICAID

## 2022-11-11 ENCOUNTER — OFFICE VISIT (OUTPATIENT)
Dept: ORTHOPEDICS | Facility: CLINIC | Age: 52
End: 2022-11-11
Payer: MEDICAID

## 2022-11-11 VITALS
SYSTOLIC BLOOD PRESSURE: 135 MMHG | HEIGHT: 69 IN | DIASTOLIC BLOOD PRESSURE: 93 MMHG | WEIGHT: 154 LBS | RESPIRATION RATE: 18 BRPM | OXYGEN SATURATION: 100 % | HEART RATE: 70 BPM | BODY MASS INDEX: 22.81 KG/M2

## 2022-11-11 DIAGNOSIS — M75.102 ROTATOR CUFF SYNDROME OF LEFT SHOULDER: ICD-10-CM

## 2022-11-11 DIAGNOSIS — M25.512 CHRONIC LEFT SHOULDER PAIN: Primary | ICD-10-CM

## 2022-11-11 DIAGNOSIS — M25.512 CHRONIC LEFT SHOULDER PAIN: ICD-10-CM

## 2022-11-11 DIAGNOSIS — G89.29 CHRONIC LEFT SHOULDER PAIN: Primary | ICD-10-CM

## 2022-11-11 DIAGNOSIS — M19.012 ARTHRITIS OF LEFT ACROMIOCLAVICULAR JOINT: ICD-10-CM

## 2022-11-11 DIAGNOSIS — M62.838 TRAPEZIUS MUSCLE SPASM: ICD-10-CM

## 2022-11-11 DIAGNOSIS — G89.29 CHRONIC LEFT SHOULDER PAIN: ICD-10-CM

## 2022-11-11 DIAGNOSIS — M54.12 CERVICAL RADICULAR PAIN: ICD-10-CM

## 2022-11-11 PROCEDURE — 3008F BODY MASS INDEX DOCD: CPT | Mod: CPTII,,, | Performed by: STUDENT IN AN ORGANIZED HEALTH CARE EDUCATION/TRAINING PROGRAM

## 2022-11-11 PROCEDURE — 1160F RVW MEDS BY RX/DR IN RCRD: CPT | Mod: CPTII,,, | Performed by: STUDENT IN AN ORGANIZED HEALTH CARE EDUCATION/TRAINING PROGRAM

## 2022-11-11 PROCEDURE — 99214 OFFICE O/P EST MOD 30 MIN: CPT | Mod: PBBFAC,25 | Performed by: STUDENT IN AN ORGANIZED HEALTH CARE EDUCATION/TRAINING PROGRAM

## 2022-11-11 PROCEDURE — 3080F DIAST BP >= 90 MM HG: CPT | Mod: CPTII,,, | Performed by: STUDENT IN AN ORGANIZED HEALTH CARE EDUCATION/TRAINING PROGRAM

## 2022-11-11 PROCEDURE — 1160F PR REVIEW ALL MEDS BY PRESCRIBER/CLIN PHARMACIST DOCUMENTED: ICD-10-PCS | Mod: CPTII,,, | Performed by: STUDENT IN AN ORGANIZED HEALTH CARE EDUCATION/TRAINING PROGRAM

## 2022-11-11 PROCEDURE — 99204 OFFICE O/P NEW MOD 45 MIN: CPT | Mod: 25,S$PBB,, | Performed by: STUDENT IN AN ORGANIZED HEALTH CARE EDUCATION/TRAINING PROGRAM

## 2022-11-11 PROCEDURE — 3080F PR MOST RECENT DIASTOLIC BLOOD PRESSURE >= 90 MM HG: ICD-10-PCS | Mod: CPTII,,, | Performed by: STUDENT IN AN ORGANIZED HEALTH CARE EDUCATION/TRAINING PROGRAM

## 2022-11-11 PROCEDURE — 3075F SYST BP GE 130 - 139MM HG: CPT | Mod: CPTII,,, | Performed by: STUDENT IN AN ORGANIZED HEALTH CARE EDUCATION/TRAINING PROGRAM

## 2022-11-11 PROCEDURE — 73030 X-RAY EXAM OF SHOULDER: CPT | Mod: TC,LT

## 2022-11-11 PROCEDURE — 99204 PR OFFICE/OUTPT VISIT, NEW, LEVL IV, 45-59 MIN: ICD-10-PCS | Mod: 25,S$PBB,, | Performed by: STUDENT IN AN ORGANIZED HEALTH CARE EDUCATION/TRAINING PROGRAM

## 2022-11-11 PROCEDURE — 3008F PR BODY MASS INDEX (BMI) DOCUMENTED: ICD-10-PCS | Mod: CPTII,,, | Performed by: STUDENT IN AN ORGANIZED HEALTH CARE EDUCATION/TRAINING PROGRAM

## 2022-11-11 PROCEDURE — 1159F MED LIST DOCD IN RCRD: CPT | Mod: CPTII,,, | Performed by: STUDENT IN AN ORGANIZED HEALTH CARE EDUCATION/TRAINING PROGRAM

## 2022-11-11 PROCEDURE — 1159F PR MEDICATION LIST DOCUMENTED IN MEDICAL RECORD: ICD-10-PCS | Mod: CPTII,,, | Performed by: STUDENT IN AN ORGANIZED HEALTH CARE EDUCATION/TRAINING PROGRAM

## 2022-11-11 PROCEDURE — 3075F PR MOST RECENT SYSTOLIC BLOOD PRESS GE 130-139MM HG: ICD-10-PCS | Mod: CPTII,,, | Performed by: STUDENT IN AN ORGANIZED HEALTH CARE EDUCATION/TRAINING PROGRAM

## 2022-11-11 PROCEDURE — 20605 DRAIN/INJ JOINT/BURSA W/O US: CPT | Mod: PBBFAC,LT | Performed by: STUDENT IN AN ORGANIZED HEALTH CARE EDUCATION/TRAINING PROGRAM

## 2022-11-11 PROCEDURE — 20605 INTERMEDIATE JOINT ASPIRATION/INJECTION: L ACROMIOCLAVICULAR: ICD-10-PCS | Mod: S$PBB,LT,, | Performed by: STUDENT IN AN ORGANIZED HEALTH CARE EDUCATION/TRAINING PROGRAM

## 2022-11-11 RX ORDER — LIDOCAINE HYDROCHLORIDE 10 MG/ML
1 INJECTION, SOLUTION EPIDURAL; INFILTRATION; INTRACAUDAL; PERINEURAL
Status: COMPLETED | OUTPATIENT
Start: 2022-11-11 | End: 2022-11-11

## 2022-11-11 RX ORDER — TRIAMCINOLONE ACETONIDE 40 MG/ML
40 INJECTION, SUSPENSION INTRA-ARTICULAR; INTRAMUSCULAR ONCE
Status: COMPLETED | OUTPATIENT
Start: 2022-11-11 | End: 2022-11-11

## 2022-11-11 RX ADMIN — TRIAMCINOLONE ACETONIDE 40 MG: 40 INJECTION, SUSPENSION INTRA-ARTICULAR; INTRAMUSCULAR at 07:11

## 2022-11-11 RX ADMIN — LIDOCAINE HYDROCHLORIDE 1 ML: 10 INJECTION, SOLUTION EPIDURAL; INFILTRATION; INTRACAUDAL; PERINEURAL at 07:11

## 2022-11-11 NOTE — PROGRESS NOTES
Subjective:       New pt   Patient ID: Galina Walker is a Right dominate 51 y.o. female. Smoker. Employment HX:  , currently employed.          Chief Complaint: Pain of the Left Shoulder    HPI:    Left shoulder pain for over a year, denies hx of injury.  Patient states she has most pain over the top of her shoulder accompanied by pain with palpation of the trapezius and endorses radiation of pain up into her lateral neck as well as down the arm.  She does endorse paresthesias into the 4th and 5th fingers with certain positions.  She states she is a history of cervical fusion by neurosurgeon in [place].    Pain level: 8 /10  Modifying Factors:  Worse with movements especially ADLs involving dressing and grooming or any lifting of the shoulder in any direction  Associated Symptoms: popping, crepitus, numbness, decreased  strength, decreased ROM, and difficulty sleeping s/t pain  Activity: sedentary with light activity and pain severely interferes with ADLs .   Previous Treatments: RX NSAIDs without significant relief.  PMH: negative for contraindications for NSAID use      Note:       Current Outpatient Medications:     busPIRone (BUSPAR) 10 MG tablet, Take 20 mg by mouth 2 (two) times daily., Disp: , Rfl:     diclofenac (VOLTAREN) 50 MG EC tablet, Take 1 tablet (50 mg total) by mouth 3 (three) times daily as needed (pain)., Disp: 30 tablet, Rfl: 0    dicyclomine (BENTYL) 20 mg tablet, Take 20 mg by mouth 4 (four) times daily as needed., Disp: , Rfl:     ondansetron (ZOFRAN-ODT) 4 MG TbDL, Take 1 tablet (4 mg total) by mouth every 6 (six) hours as needed. (Patient not taking: Reported on 9/23/2022), Disp: 20 tablet, Rfl: 0    Current Facility-Administered Medications:     LIDOcaine (PF) 10 mg/ml (1%) injection 10 mg, 1 mL, Intra-articular, 1 time in Clinic/HOD, Elvi Paredes MD    triamcinolone acetonide injection 40 mg, 40 mg, Intra-articular, Once, Elvi Paredes MD    Review of patient's  "allergies indicates:   Allergen Reactions    Ibuprofen Nausea And Vomiting     Burns pt's stomach       No results found for: LABA1C   Body mass index is 22.74 kg/m².   Vitals:    11/11/22 0731   BP: (!) 135/93   Pulse: 70   Resp: 18   SpO2: 100%   Weight: 69.9 kg (154 lb)   Height: 5' 9" (1.753 m)   PainSc:   8        ROS  Review of Systems:  A ten-point review of systems was performed and is negative, except as mentioned above.        Objective:      General: well developed; well nourished; cooperative  PSYCH: alert and oriented with appropriate mood and affect  SKIN: inspection and palpation of skin and soft tissue normal;  CV: vascular integrity noted; +2 symmetrical pulses  Resp: Normal work of breathing, quiet breathing    Shoulder: left  Inspection:   No swelling, erythema or skin breakdown.  No atrophy or asymmetry.  Palpation:    + trapezius Spasm, GH Crepitus.  Tenderness to Palpation (Bold if positive)     AC joint, supraspinatus, infraspinatus, biceps tendon    Active ROM:  Flexion:restricted to 100 degrees  Extension:unrestricted   Abduction:restricted to ~110 degrees  External rot:unrestricted  Internal rot:unrestricted  Passive ROM:   As above    SPECIAL TESTS:  Rotator Cuff:         - Empty can: positive     - Subscap push-off:  positive  AC joint:   - AC compression: +   - Crossed arm abd:negative  Impingement tests:        - Neer (I): positive     - Hawkins (II): positive   Instability tests:   - Apprehension: negative   - Sulcus sign: negative  Labral tests:         - Oelissas:+     - Axial Load & Grind:negative   - Bicep Load Test: negative  Thoracic outlet tests:   - Saulo: negative     Cervical exam: Normal                     - Spurlings maneuver:+    Neurovascular exam:   Dermatomes: C5-T1: intact to light touch   Brachioradialis reflex: normal   Triceps  reflex:  normal   Radial pulse: Normal       Imaging:  X-ray reviewed and independently interpreted by me.  Discussed with patient.   As " per my interpretation of this patient's plain film, GH and AC joints are grossly preserved with some presents on a AC joint.  Rotator cuff interval is grossly preserved.  No fractures/ dislocations noted.        Assessment:       1. Chronic left shoulder pain    2. Arthritis of left acromioclavicular joint    3. Rotator cuff syndrome of left shoulder    4. Cervical radicular pain    5. Trapezius muscle spasm          Plan:       Orders Placed This Encounter    Intermediate Joint Aspiration/Injection: L acromioclavicular    X-Ray Shoulder 2 or More Views Left    LIDOcaine (PF) 10 mg/ml (1%) injection 10 mg    triamcinolone acetonide injection 40 mg     51 y.o.  patient presenting for evaluation of Pain of the Left Shoulder   secondary to AC joint arthritis, rotator cuff syndrome complicated by an underlying cervical radiculitis and trapezius muscle spasms.     moderately exacerbated.   Radiological studies ordered and interpreted by me, my independent interpretation attached, reviewed my findings with patient and showed them their images  CSI performed today to L AC joint, consented, site marked, time out performed, tolerated well, NVI following injection;  Activity as tolerated, behavior modification encouraged  Formal PT x 12wks, 3 times weekly, HEP daily, Ice/Heat prn, NSAIDs/Tylenol/topical anasthetics PRN, med precautions given,   Follow up 6 weeks  If the patient's pain was not significantly improved by today CSI may consider CSI to the subacromial/subdeltoid region to address her rotator cuff syndrome left trigger point injection to the trapezius muscle.    Patient is strongly encouraged with PCP for evaluation of her cervical radicular symptoms as she may require referral back to neurosurgeon entry port.      Elvi Paredes MD    Intermediate Joint Aspiration/Injection: L acromioclavicular    Date/Time: 11/11/2022 7:30 AM  Performed by: Elvi Paredes MD  Authorized by: Elvi Paredes MD     Consent  Done?:  Yes (Written)  Indications:  Pain  Site marked: The procedure site was marked    Timeout: Prior to procedure the correct patient, procedure, and site was verified      Location:  Shoulder  Site:  L acromioclavicular  Prep: Patient was prepped and draped in usual sterile fashion    Needle size:  25 G  Approach:  Anteromedial  Medications:  1 mL LIDOcaine (PF) injection 1% 1ml; 40 mg (KENALOG-40) injection 40 mg/mL  Patient tolerance:  Patient tolerated the procedure well with no immediate complications       Additional Comments: RISKS: Possible complications with injection include bleeding, infection (0.01%), pain, allergic reaction to medicine or preservatives within the medicine    Post Procedure: Patient alert, moving all extremities. Good peripheral pulses, no signs of vascular compromise, range of motion intact. Patient tolerated procedure well. Aftercare instructions were given to patient at time of discharge. Relative rest for 3 days - avoiding excessive activity. Place ice on area for 15 minutes every 4 to 6 hours. Tylenol 1000mg twice a day or ibuprofen 600 mg three times per day for next 3-4 days if not on medication already. Avoid excessive activity for next 3 to 4 weeks. ER precautions for fever, severe joint pain, or allergic reaction or other new symptoms related to joint injection.

## 2022-11-23 ENCOUNTER — OFFICE VISIT (OUTPATIENT)
Dept: ORTHOPEDICS | Facility: CLINIC | Age: 52
End: 2022-11-23
Payer: MEDICAID

## 2022-11-23 DIAGNOSIS — M62.838 TRAPEZIUS MUSCLE SPASM: ICD-10-CM

## 2022-11-23 DIAGNOSIS — M75.102 ROTATOR CUFF SYNDROME OF LEFT SHOULDER: ICD-10-CM

## 2022-11-23 DIAGNOSIS — G89.29 CHRONIC LEFT SHOULDER PAIN: Primary | ICD-10-CM

## 2022-11-23 DIAGNOSIS — M25.512 CHRONIC LEFT SHOULDER PAIN: Primary | ICD-10-CM

## 2022-11-23 PROCEDURE — 99214 PR OFFICE/OUTPT VISIT, EST, LEVL IV, 30-39 MIN: ICD-10-PCS | Mod: 95,,, | Performed by: STUDENT IN AN ORGANIZED HEALTH CARE EDUCATION/TRAINING PROGRAM

## 2022-11-23 PROCEDURE — 1160F RVW MEDS BY RX/DR IN RCRD: CPT | Mod: CPTII,95,, | Performed by: STUDENT IN AN ORGANIZED HEALTH CARE EDUCATION/TRAINING PROGRAM

## 2022-11-23 PROCEDURE — 1159F PR MEDICATION LIST DOCUMENTED IN MEDICAL RECORD: ICD-10-PCS | Mod: CPTII,95,, | Performed by: STUDENT IN AN ORGANIZED HEALTH CARE EDUCATION/TRAINING PROGRAM

## 2022-11-23 PROCEDURE — 99214 OFFICE O/P EST MOD 30 MIN: CPT | Mod: 95,,, | Performed by: STUDENT IN AN ORGANIZED HEALTH CARE EDUCATION/TRAINING PROGRAM

## 2022-11-23 PROCEDURE — 1159F MED LIST DOCD IN RCRD: CPT | Mod: CPTII,95,, | Performed by: STUDENT IN AN ORGANIZED HEALTH CARE EDUCATION/TRAINING PROGRAM

## 2022-11-23 PROCEDURE — 1160F PR REVIEW ALL MEDS BY PRESCRIBER/CLIN PHARMACIST DOCUMENTED: ICD-10-PCS | Mod: CPTII,95,, | Performed by: STUDENT IN AN ORGANIZED HEALTH CARE EDUCATION/TRAINING PROGRAM

## 2022-11-23 RX ORDER — CYCLOBENZAPRINE HCL 5 MG
5 TABLET ORAL 3 TIMES DAILY PRN
Qty: 21 TABLET | Refills: 0 | Status: SHIPPED | OUTPATIENT
Start: 2022-11-23 | End: 2022-11-30

## 2022-11-23 RX ORDER — BUSPIRONE HYDROCHLORIDE 10 MG/1
20 TABLET ORAL
COMMUNITY
Start: 2022-02-15 | End: 2023-01-18 | Stop reason: SDUPTHER

## 2022-11-23 RX ORDER — DICLOFENAC SODIUM 75 MG/1
75 TABLET, DELAYED RELEASE ORAL 2 TIMES DAILY PRN
Qty: 60 TABLET | Refills: 0 | Status: SHIPPED | OUTPATIENT
Start: 2022-11-23 | End: 2022-12-23

## 2022-11-23 NOTE — PROGRESS NOTES
This is a telemedicine encounter note.   Galina Walker was evaluated and treated using telemedicine, real time audio and video attempted but video unsuccessful, according to [facility] protocols.   I, Elvi Paredes MD, conducted the visit from [facility] or an [facility] connected phone line. The patient participated in the visit at a non-OU location selected by the patient (or patients representative), identified as there personal residence in [place], [place]. I am currently licensed in the [facility] where the patient stated they are located. The patient (or patients representative) stated that they understood and accepted the privacy and security risks to their information at their location. The platform used for this telemedicine encounter was through our [facility].      Subjective:       Existing pt, last seen 11/11/2022, prior note reviewed    Patient ID: Galina Walker is a Right dominate 51 y.o. female. Smoker. Employment HX:  , currently employed.           Chief Complaint: Pain of the Left Shoulder      HPI:    Left shoulder pain for over a year, denies hx of injury.  Patient states she has most pain over the top of her shoulder accompanied by pain with palpation of the trapezius and endorses radiation of pain up into her lateral neck as well as down the arm.  She does endorse paresthesias into the 4th and 5th fingers with certain positions.  She states she is a history of cervical fusion by neurosurgeon in [place].    At patient's last visit she was given CSI to the left AC joint.  Patient states she had about 2 days of relief and then her pain returned just as it was before.  She continues to have pain originating in the left trapezius traveling through this shoulder down into the hand.  She is pain especially when trying to abduct the arm and when extending her neck back.  Patient states she has tried to get a hold of her PCP to start neck workup or  be put back in touch with neurosurgeon in [place].  Patient states she is set up to start physical therapy in [place] next week.  Patient requests pain medicine for her neck/shoulder discomfort.     Pain level: 10 /10  Modifying Factors:  Worse with movements especially ADLs involving dressing and grooming or any lifting of the shoulder in any direction  Associated Symptoms: popping, crepitus, numbness, decreased  strength, decreased ROM, and difficulty sleeping s/t pain  Activity: sedentary with light activity and pain severely interferes with ADLs .   Previous Treatments: RX NSAIDs without significant relief.  PMH: negative for contraindications for NSAID use    Note:       Current Outpatient Medications:     busPIRone (BUSPAR) 10 MG tablet, Take 20 mg by mouth., Disp: , Rfl:     busPIRone (BUSPAR) 10 MG tablet, Take 20 mg by mouth 2 (two) times daily., Disp: , Rfl:     cyclobenzaprine (FLEXERIL) 5 MG tablet, Take 1 tablet (5 mg total) by mouth 3 (three) times daily as needed for Muscle spasms., Disp: 21 tablet, Rfl: 0    diclofenac (VOLTAREN) 75 MG EC tablet, Take 1 tablet (75 mg total) by mouth 2 (two) times daily as needed (pain)., Disp: 60 tablet, Rfl: 0    dicyclomine (BENTYL) 20 mg tablet, Take 20 mg by mouth 4 (four) times daily as needed., Disp: , Rfl:     ondansetron (ZOFRAN-ODT) 4 MG TbDL, Take 1 tablet (4 mg total) by mouth every 6 (six) hours as needed. (Patient not taking: Reported on 9/23/2022), Disp: 20 tablet, Rfl: 0    Review of patient's allergies indicates:   Allergen Reactions    Ibuprofen Nausea And Vomiting     Burns pt's stomach       No results found for: LABA1C   There is no height or weight on file to calculate BMI.   Vitals:    11/23/22 1156   PainSc: 10-Worst pain ever        ROS  Review of Systems:  A ten-point review of systems was performed and is negative, except as mentioned above.        Objective:      General: pt cooperative with conversation  PSYCH: alert and oriented  with appropriate mood  Resp: quiet breathing, no perceived SOB  MSK:    Left shoulder    Patient shown to have tenderness to palpation over the left trapezius with tightness.  Active ROM:  Flexion:restricted to 100 degrees  Extension:unrestricted              Abduction:restricted to ~110 degrees    Imaging:  X-ray reviewed and independently interpreted by me.  Discussed with patient.    As per my interpretation of this patient's plain film, GH and AC joints are grossly preserved with some presents on a AC joint.  Rotator cuff interval is grossly preserved.  No fractures/ dislocations noted.           Assessment:             1. Chronic left shoulder pain    2. Trapezius muscle spasm    3. Rotator cuff syndrome of left shoulder            Plan:       Orders Placed This Encounter    cyclobenzaprine (FLEXERIL) 5 MG tablet    diclofenac (VOLTAREN) 75 MG EC tablet     51 y.o.  patient presenting for evaluation of Pain of the Left Shoulder   secondary to AC joint arthritis, rotator cuff syndrome complicated by an underlying cervical radiculitis and trapezius muscle spasms.  Given that the CSI to her left AC joint did not provide much relief her pain is likely from the trapezius muscle spasm complicated by her radicular cervical pain.     moderately exacerbated.   Radiological studies interpreted by me, my independent interpretation attached, reviewed my findings with patient and showed them their images  Activity as tolerated, behavior modification encouraged  Encouraged to continue to pursue Formal PT x 12wks, 3 times weekly, HEP daily, Ice/Heat prn, NSAIDs/Tylenol/topical anasthetics PRN, med precautions given, short course of Flexeril t.i.d. sent to pharmacy as well as p.o. diclofenac.  Follow up 6 weeks    consider CSI to the subacromial/subdeltoid region to address her rotator cuff syndrome left trigger point injection to the trapezius muscle at subsequent visits.     Patient is strongly encouraged with PCP for  evaluation of her cervical radicular symptoms as she may require referral back to neurosurgeon entry port.     real time audio and video Of the time spent with patient,over 50% of which was used for education and counseling regarding medical conditions, current medications including risk/benefit and side effects/adverse events, over the counter medications-uses/doses, home self-care and contact precautions, and red flags and indications for immediate medical attention.  The patient is receptive, expresses understanding and is agreeable to plan. All questions answered to patient's satisfaction.    NOTE:  Extended time spent with patient collecting history and educating on DX and treatment plan.  10 minutes spent prior to call reviewing EMR: prior notes, outside provider documentation, labs and imaging.   11 minutes spent on telemedicine call with the patient obtaining history, observing patient's body part and movement, reviewing results and discussing treatment plan and recommendations.  10 minutes spent after call completing electronic health record documentation.    Total time devoted to this patient: 31 minutes     Elvi Paredes MD

## 2022-12-06 ENCOUNTER — TELEPHONE (OUTPATIENT)
Dept: GYNECOLOGY | Facility: CLINIC | Age: 52
End: 2022-12-06
Payer: MEDICAID

## 2023-01-09 ENCOUNTER — DOCUMENTATION ONLY (OUTPATIENT)
Dept: ADMINISTRATIVE | Facility: HOSPITAL | Age: 53
End: 2023-01-09
Payer: MEDICAID

## 2023-01-10 ENCOUNTER — OFFICE VISIT (OUTPATIENT)
Dept: ORTHOPEDICS | Facility: CLINIC | Age: 53
End: 2023-01-10
Payer: MEDICAID

## 2023-01-10 VITALS — WEIGHT: 158.81 LBS | BODY MASS INDEX: 23.52 KG/M2 | HEIGHT: 69 IN

## 2023-01-10 DIAGNOSIS — M75.102 ROTATOR CUFF SYNDROME, LEFT: Primary | ICD-10-CM

## 2023-01-10 PROCEDURE — 99215 OFFICE O/P EST HI 40 MIN: CPT | Mod: 25,S$PBB,, | Performed by: NURSE PRACTITIONER

## 2023-01-10 PROCEDURE — 1159F MED LIST DOCD IN RCRD: CPT | Mod: CPTII,,, | Performed by: NURSE PRACTITIONER

## 2023-01-10 PROCEDURE — 1159F PR MEDICATION LIST DOCUMENTED IN MEDICAL RECORD: ICD-10-PCS | Mod: CPTII,,, | Performed by: NURSE PRACTITIONER

## 2023-01-10 PROCEDURE — 1160F RVW MEDS BY RX/DR IN RCRD: CPT | Mod: CPTII,,, | Performed by: NURSE PRACTITIONER

## 2023-01-10 PROCEDURE — 20610 DRAIN/INJ JOINT/BURSA W/O US: CPT | Mod: PBBFAC,LT | Performed by: NURSE PRACTITIONER

## 2023-01-10 PROCEDURE — 20610 LARGE JOINT ASPIRATION/INJECTION: L SUBACROMIAL BURSA: ICD-10-PCS | Mod: S$PBB,LT,, | Performed by: NURSE PRACTITIONER

## 2023-01-10 PROCEDURE — 99213 OFFICE O/P EST LOW 20 MIN: CPT | Mod: PBBFAC | Performed by: NURSE PRACTITIONER

## 2023-01-10 PROCEDURE — 3008F BODY MASS INDEX DOCD: CPT | Mod: CPTII,,, | Performed by: NURSE PRACTITIONER

## 2023-01-10 PROCEDURE — 1160F PR REVIEW ALL MEDS BY PRESCRIBER/CLIN PHARMACIST DOCUMENTED: ICD-10-PCS | Mod: CPTII,,, | Performed by: NURSE PRACTITIONER

## 2023-01-10 PROCEDURE — 99215 PR OFFICE/OUTPT VISIT, EST, LEVL V, 40-54 MIN: ICD-10-PCS | Mod: 25,S$PBB,, | Performed by: NURSE PRACTITIONER

## 2023-01-10 PROCEDURE — 3008F PR BODY MASS INDEX (BMI) DOCUMENTED: ICD-10-PCS | Mod: CPTII,,, | Performed by: NURSE PRACTITIONER

## 2023-01-10 RX ORDER — TRIAMCINOLONE ACETONIDE 40 MG/ML
40 INJECTION, SUSPENSION INTRA-ARTICULAR; INTRAMUSCULAR
Status: COMPLETED | OUTPATIENT
Start: 2023-01-10 | End: 2023-01-10

## 2023-01-10 RX ORDER — LIDOCAINE HYDROCHLORIDE 10 MG/ML
5 INJECTION, SOLUTION EPIDURAL; INFILTRATION; INTRACAUDAL; PERINEURAL
Status: COMPLETED | OUTPATIENT
Start: 2023-01-10 | End: 2023-01-10

## 2023-01-10 RX ADMIN — TRIAMCINOLONE ACETONIDE 40 MG: 40 INJECTION, SUSPENSION INTRA-ARTICULAR; INTRAMUSCULAR at 09:01

## 2023-01-10 RX ADMIN — LIDOCAINE HYDROCHLORIDE 5 ML: 10 INJECTION, SOLUTION EPIDURAL; INFILTRATION; INTRACAUDAL; PERINEURAL at 09:01

## 2023-01-10 NOTE — PROGRESS NOTES
"  Subjective:       Existing patient, new to me   Patient ID: Galina Walker is a 52 y.o. female Non-smoker. Employment HX: , currently employed.    Seen [facility] for same DX since 2022.   Chief Complaint: Pain of the Left Shoulder    HPI:    Left aching and sharp lateral  and posterior shoulder pain. Right dominate  Injury: no known injury  Onset: several years ago fluctuates   Modifying Factors: increased with overhead movement, worse with activity, improved with rest, radiates into neck/ arm, and increased with lifting  Associated Symptoms: popping, numbness, swelling unrelated to activity, and difficulty sleeping s/t pain  Activity: sedentary with light activity and pain mildly interferes with ADLs .   Previous Treatments: CSI since 2022 last injection 11/11/22, HEP with TheraBand, and RX NSAIDs with adequate relief but symptoms returning  PMH: negative for contraindications for NSAID use  Family History: + OA    Note: Patient was being seen by Dr. Paredes who is no longer practicing in this area and I will assuming care.  Is scheduled to start recommended RX PT in next week or so.  Was scheduled today but canceled by provider.  Last CSI given 11/11/22 with " great " relief but symptoms returning recently and affecting ADLs. Requesting CSI today in order to improve symptoms for RX PT.  Has not been able to discuss neurology referral for related cervical symptoms yet as recommended by Dr. Paredes.     Current Outpatient Medications:     busPIRone (BUSPAR) 10 MG tablet, Take 20 mg by mouth 2 (two) times daily., Disp: , Rfl:     busPIRone (BUSPAR) 10 MG tablet, Take 20 mg by mouth., Disp: , Rfl:     dicyclomine (BENTYL) 20 mg tablet, Take 20 mg by mouth 4 (four) times daily as needed., Disp: , Rfl:     ondansetron (ZOFRAN-ODT) 4 MG TbDL, Take 1 tablet (4 mg total) by mouth every 6 (six) hours as needed. (Patient not taking: Reported on 9/23/2022), Disp: 20 tablet, Rfl: 0  Review of patient's allergies " "indicates:   Allergen Reactions    Ibuprofen Nausea And Vomiting     Burns pt's stomach     Hemoglobin A1c   Date Value Ref Range Status   02/02/2022 5.2 <=7.0       Body mass index is 23.45 kg/m².   Vitals:    01/10/23 1018   Weight: 72 kg (158 lb 12.8 oz)   Height: 5' 9" (1.753 m)      Review of Systems:  A ten-point review of systems was performed and is negative, except as mentioned above  Objective:   MSK Bilateral Shoulder  General:  no apparent distress, no pain indicators prior to exam, well nourished  Inspection: poor posture with rounded upper back, no guarding/ self imposed immobilization of LEFT shoulder, no swelling, LEFT mild deconditioning supraspinatus, deconditioning infraspinatus, no scapula dyskinesis   Palpation:  LEFT tenderness at supraspinatus tendon and infraspinatus tendon insertion and subacromial tenderness, no crepitus, normal temperature, non-tender AC / GH joint and bicep tendon. Severe tenderness over trapezius muscle with noted tightness.   Active ROM:    Abduction (180):  180 NP (R)  160 P (L)  Horizontal Adduction (45): 45 NP (R)  45 NP (L)  Forward Flexion (180): 180 NP (R)  180 NP (L)  Posterior Extension (45): 45 NP (R)  45 NP (L)  Internal Rotation:  L-spine  NP (R)  sacrum P (L)  External Rotation (60):  60 NP (R)  45 P (L)  Passive ROM: WNL  Resisted Isometric Strength Testing:   Flexion: 5 / 5 NP (R)  4 / 5 P (L)  Extension: 5 / 5 NP (R)  5 / 5 NP (L)  Abduction: 5 / 5 NP (R)  4 / 5 P (L)  Adduction: 5 / 5 NP (R) 4 / 5 P (L)  External Rotation: 5 / 5 NP (R)  4 / 5 P (L)  Elbow Flexion: 5 / 5 NP (R)  5 / 5 NP (L)  Elbow Extension: 5 / 5 NP (R)  5 / 5 NP (L)  Special Testing:  Bicep Tendon  Hook:     -- (R)  -- (L)  Ronny Sign    -- (R)  -- (L)  Rotator Cuff:  Full Can (supraspinatus)     -- (R)  + (L)  Empty Can (subacromial)    -- (R)  + (L)  Lift Off Sign (subscapularis)  -- (R)  + (L)  Belly Press (subscapularis)  -- (R)  + (L)  Hornblower's Sign (infraspinatus)  -- (R)  + " (L)  AC Joint:  Cross Arm Adduction     -- (R)  -- (L)  Impingement:  Ortiz Tristan (subacromial imp) -- (R)  + (L)  Posterior Imp Sign   -- (R)  -- (L)  Painful Arc ( 0 SA imp)  -- (R)  + (L)  Painful Arc (170-1800 AC imp)  -- (R)  + (L)  Instability  Shoulder Apprehension     -- (R)  -- (L)  Labral:  Harford's:       -- (R)  -- (L)  Cervical ROM Pain --  Neurovascular: Intact to light touch  Neuro/ Psych: Awake, alert, oriented, normal mood and affect  Lymphatic: No LAD  Skin/ Soft Tissue: no rash, skin intact  Assessment:       Imaging: X-ray 4 views of left shoulder  dated 11/11/22, reviewed and independently interpreted by me. Discussed with patient. No acute findings .    XR SHOULDER COMPLETE 2 OR MORE VIEWS LEFT 11/11/22  CLINICAL HISTORY:  Pain in left shoulder  COMPARISON:  X-rays dated 10/26/2021  FINDINGS:  There is no acute fracture or malalignment.  The soft tissues are unremarkable.  Impression:  No acute bony abnormality    EMR Review: completed with noted [facility] visit 11/23/22 & 11/11/22 reviewed    1. Rotator cuff syndrome, left    2. BMI 23.0-23.9, adult      Plan:       Ongoing education about DX and treatment recommendations including conservative treatments of daily HEP with TheraBand, muscle strengthening, adequate vit D/C, glucosamine 1500 mg/day and daily acetaminophen 1000 mg 3 times/ day if able to tolerate.    Treatment plan: Rotator Cuff Syndrome and with trapezius muscle spasm complicated by cervical radiculitis symptoms   Currently having adequate relief with current treatment plan. Intra-articular injections today due to return of symptoms since last injection which are impacting ADLs.   If no improvement after RX PT will consider MRI.   Procedure: CSI v. VS injections discussed, s/t failed conservative treatments patient consents to CSI today  RX Medications: continue medications as RX per PCP  RTC: 4 months    Melanie Ramsay FNP    Ortho Procedure Note   Procedure: Left  SASD Shoulder CSI    Large Joint Aspiration/Injection: L subacromial bursa    Date/Time: 1/10/2023 9:40 AM  Performed by: Melanie Ramsay NP  Authorized by: Melanie Ramsay NP     Consent Done?:  Yes (Written)  Indications:  Pain and arthritis  Site marked: the procedure site was marked    Timeout: prior to procedure the correct patient, procedure, and site was verified      Local anesthesia used?: Yes    Local anesthetic:  Topical anesthetic    Details:  Needle Size:  21 G  Ultrasonic Guidance for needle placement?: No    Approach:  Posterior  Location:  Shoulder  Site:  L subacromial bursa  Medications:  40 mg Kenalog 40 mg; 5 mL Lidocaine 1% (PF) 5 mL  Patient tolerance:  Patient tolerated the procedure well with no immediate complications     Topical ethyl chloride was applied. Contents of syringe included: 5cc of 1% of lidocaine with 40mg of Kenalog   Complications: None   EBL: None   Post Procedure: Patient alert, and moving all extremities. ROM improved, pain decreased.  Good peripheral pulses, no signs of vascular compromise and range of motion intact.  Aftercare instructions were given to patient at time of discharge.  Relative rest for 3 days-avoiding excess activity.  Place ice on the area for 15 minutes every 4-6 hours. Patient may take Tylenol a 1000 mg b.i.d. or ibuprofen 600 mg t.i.d. for the next 3-4 days if not on medication already and safe to take pending co-morbidities.  Protect the area for the next 1-8 hours if anesthetic was used.  Avoid excessive activity for the next 3-4 weeks.  ER precautions given for fever, severe joint pain or allergic reaction or other new symptoms related to the joint injection.        Timed Billing Note  Total Time Spent with Patient: 30 minutes Excludes Time Spent Performing Procedure  Visit Start Time: 1100  10 minutes spent prior to visit reviewing EMR, prior labs and x-rays.  20 minutes spent in visit with patient completing exam, obtaining history,  educating on DX and treatment plan.  10 minutes spent after visit completing EMR documentation.   Visit End Time: 1140

## 2023-01-14 ENCOUNTER — HOSPITAL ENCOUNTER (EMERGENCY)
Facility: HOSPITAL | Age: 53
Discharge: HOME | End: 2023-01-14
Attending: EMERGENCY MEDICINE
Payer: MEDICAID

## 2023-01-14 VITALS
OXYGEN SATURATION: 100 % | RESPIRATION RATE: 18 BRPM | SYSTOLIC BLOOD PRESSURE: 164 MMHG | HEART RATE: 74 BPM | TEMPERATURE: 99 F | BODY MASS INDEX: 23.4 KG/M2 | DIASTOLIC BLOOD PRESSURE: 93 MMHG | HEIGHT: 69 IN | WEIGHT: 158 LBS

## 2023-01-14 DIAGNOSIS — N30.00 ACUTE CYSTITIS WITHOUT HEMATURIA: Primary | ICD-10-CM

## 2023-01-14 LAB
APPEARANCE UR: CLEAR
BACTERIA #/AREA URNS AUTO: ABNORMAL /HPF
BILIRUB UR QL STRIP.AUTO: NEGATIVE MG/DL
COLOR UR AUTO: YELLOW
GLUCOSE UR QL STRIP.AUTO: NEGATIVE MG/DL
KETONES UR QL STRIP.AUTO: 15 MG/DL
LEUKOCYTE ESTERASE UR QL STRIP.AUTO: ABNORMAL UNIT/L
NITRITE UR QL STRIP.AUTO: NEGATIVE
PH UR STRIP.AUTO: 6.5 [PH]
PROT UR QL STRIP.AUTO: NEGATIVE MG/DL
RBC #/AREA URNS AUTO: ABNORMAL /HPF
RBC UR QL AUTO: ABNORMAL UNIT/L
SP GR UR STRIP.AUTO: 1.02
SQUAMOUS #/AREA URNS AUTO: ABNORMAL /HPF
UROBILINOGEN UR STRIP-ACNC: 0.2 MG/DL
WBC #/AREA URNS AUTO: ABNORMAL /HPF

## 2023-01-14 PROCEDURE — 81001 URINALYSIS AUTO W/SCOPE: CPT | Performed by: EMERGENCY MEDICINE

## 2023-01-14 PROCEDURE — 99284 EMERGENCY DEPT VISIT MOD MDM: CPT

## 2023-01-14 RX ORDER — PHENAZOPYRIDINE HYDROCHLORIDE 200 MG/1
200 TABLET, FILM COATED ORAL 3 TIMES DAILY PRN
Qty: 10 TABLET | Refills: 0 | Status: SHIPPED | OUTPATIENT
Start: 2023-01-14 | End: 2023-01-17

## 2023-01-14 RX ORDER — NITROFURANTOIN 25; 75 MG/1; MG/1
100 CAPSULE ORAL 2 TIMES DAILY
Qty: 10 CAPSULE | Refills: 0 | Status: SHIPPED | OUTPATIENT
Start: 2023-01-14 | End: 2023-01-17 | Stop reason: ALTCHOICE

## 2023-01-14 NOTE — ED PROVIDER NOTES
Encounter Date: 1/14/2023       History     Chief Complaint   Patient presents with    Urinary Frequency     Reports she started with urinary frequency since last night then started burning and pain with urination this morning     This 52-year-old female presents to the emergency room with complaints of urinary frequency and burning on urination that started last night and worsened through the day today.     Review of patient's allergies indicates:   Allergen Reactions    Ibuprofen Nausea And Vomiting     Burns pt's stomach     Past Medical History:   Diagnosis Date    Pituitary mass 8/30/2022     Past Surgical History:   Procedure Laterality Date    COLECTOMY  2017    COLONOSCOPY  01/27/2022    COLONOSCOPY  12/13/2017    [facility], Rony Majano MD    NECK SURGERY       History reviewed. No pertinent family history.  Social History     Tobacco Use    Smoking status: Every Day     Packs/day: 0.50     Types: Cigarettes    Smokeless tobacco: Never   Substance Use Topics    Alcohol use: Not Currently    Drug use: Never     Review of Systems   Constitutional:  Negative for fever.   HENT:  Negative for sore throat.    Respiratory:  Negative for shortness of breath.    Cardiovascular:  Negative for chest pain.   Gastrointestinal:  Negative for nausea.   Genitourinary:  Positive for dysuria and frequency.   Musculoskeletal:  Negative for back pain.   Skin:  Negative for rash.   Neurological:  Negative for weakness.   Hematological:  Does not bruise/bleed easily.     Physical Exam     Initial Vitals [01/14/23 1559]   BP Pulse Resp Temp SpO2   (!) 164/93 74 18 98.6 °F (37 °C) 100 %      MAP       --         Physical Exam    Nursing note and vitals reviewed.  Constitutional: She appears well-developed and well-nourished.   HENT:   Head: Normocephalic and atraumatic.   Eyes: Conjunctivae and EOM are normal. Pupils are equal, round, and reactive to light.   Neck: Neck supple.   Normal range of motion.  Cardiovascular:  Normal  rate, regular rhythm, normal heart sounds and intact distal pulses.           Pulmonary/Chest: Breath sounds normal.   Abdominal: Abdomen is soft. Bowel sounds are normal.   Musculoskeletal:         General: Normal range of motion.      Cervical back: Normal range of motion and neck supple.     Neurological: She is alert and oriented to person, place, and time. She has normal strength.   Skin: Skin is warm and dry. Capillary refill takes less than 2 seconds.   Psychiatric: She has a normal mood and affect. Her behavior is normal. Judgment and thought content normal.       ED Course   Procedures  Labs Reviewed   URINALYSIS, REFLEX TO URINE CULTURE - Abnormal; Notable for the following components:       Result Value    Ketones, UA 15 (*)     Blood, UA Trace-Intact (*)     Leukocyte Esterase, UA Trace (*)     All other components within normal limits   URINALYSIS, MICROSCOPIC - Abnormal; Notable for the following components:    Bacteria, UA Many (*)     WBC, UA 6-10 (*)     All other components within normal limits          Imaging Results    None          Medications - No data to display                           Clinical Impression:   Final diagnoses:  [N30.00] Acute cystitis without hematuria (Primary)        ED Disposition Condition    Discharge Stable          ED Prescriptions       Medication Sig Dispense Start Date End Date Auth. Provider    nitrofurantoin, macrocrystal-monohydrate, (MACROBID) 100 MG capsule Take 1 capsule (100 mg total) by mouth 2 (two) times daily. for 5 days 10 capsule 1/14/2023 1/19/2023 Dany Garsia MD    phenazopyridine (PYRIDIUM) 200 MG tablet Take 1 tablet (200 mg total) by mouth 3 (three) times daily as needed for Pain. 10 tablet 1/14/2023 1/17/2023 Dany Garsia MD          Follow-up Information       Follow up With Specialties Details Why Contact Info    Follow up with your PCP as needed.                 Dany Garsia MD  01/14/23 9701

## 2023-01-17 ENCOUNTER — HOSPITAL ENCOUNTER (EMERGENCY)
Facility: HOSPITAL | Age: 53
Discharge: HOME | End: 2023-01-17
Attending: FAMILY MEDICINE
Payer: MEDICAID

## 2023-01-17 VITALS
HEART RATE: 78 BPM | RESPIRATION RATE: 16 BRPM | BODY MASS INDEX: 23.4 KG/M2 | OXYGEN SATURATION: 97 % | DIASTOLIC BLOOD PRESSURE: 70 MMHG | HEIGHT: 69 IN | WEIGHT: 158 LBS | TEMPERATURE: 98 F | SYSTOLIC BLOOD PRESSURE: 118 MMHG

## 2023-01-17 DIAGNOSIS — R10.9 RIGHT FLANK PAIN: Primary | ICD-10-CM

## 2023-01-17 DIAGNOSIS — N30.01 ACUTE CYSTITIS WITH HEMATURIA: ICD-10-CM

## 2023-01-17 LAB
ANION GAP SERPL CALC-SCNC: 12 MEQ/L
APPEARANCE UR: ABNORMAL
B-HCG SERPL QL: NEGATIVE
BACTERIA #/AREA URNS AUTO: ABNORMAL /HPF
BASOPHILS # BLD AUTO: 0.01 X10(3)/MCL (ref 0–0.2)
BASOPHILS NFR BLD AUTO: 0.1 %
BILIRUB UR QL STRIP.AUTO: ABNORMAL MG/DL
BUN SERPL-MCNC: 11.8 MG/DL (ref 9.8–20.1)
CALCIUM SERPL-MCNC: 10.1 MG/DL (ref 8.4–10.2)
CHLORIDE SERPL-SCNC: 108 MMOL/L (ref 98–107)
CO2 SERPL-SCNC: 24 MMOL/L (ref 22–29)
COLOR UR AUTO: ABNORMAL
CREAT SERPL-MCNC: 0.89 MG/DL (ref 0.55–1.02)
CREAT/UREA NIT SERPL: 13
EOSINOPHIL # BLD AUTO: 0.07 X10(3)/MCL (ref 0–0.9)
EOSINOPHIL NFR BLD AUTO: 0.5 %
ERYTHROCYTE [DISTWIDTH] IN BLOOD BY AUTOMATED COUNT: 13.2 % (ref 11.5–17)
GFR SERPLBLD CREATININE-BSD FMLA CKD-EPI: >60 MLS/MIN/1.73/M2
GLUCOSE SERPL-MCNC: 100 MG/DL (ref 74–100)
GLUCOSE UR QL STRIP.AUTO: 250 MG/DL
HCT VFR BLD AUTO: 46.5 % (ref 37–47)
HGB BLD-MCNC: 14.7 GM/DL (ref 12–16)
IMM GRANULOCYTES # BLD AUTO: 0.09 X10(3)/MCL (ref 0–0.04)
IMM GRANULOCYTES NFR BLD AUTO: 0.6 %
KETONES UR QL STRIP.AUTO: ABNORMAL MG/DL
LEUKOCYTE ESTERASE UR QL STRIP.AUTO: ABNORMAL UNIT/L
LYMPHOCYTES # BLD AUTO: 1.76 X10(3)/MCL (ref 0.6–4.6)
LYMPHOCYTES NFR BLD AUTO: 12.2 %
MCH RBC QN AUTO: 31.5 PG
MCHC RBC AUTO-ENTMCNC: 31.6 MG/DL (ref 33–36)
MCV RBC AUTO: 99.8 FL (ref 80–94)
MONOCYTES # BLD AUTO: 0.94 X10(3)/MCL (ref 0.1–1.3)
MONOCYTES NFR BLD AUTO: 6.5 %
NEUTROPHILS # BLD AUTO: 11.56 X10(3)/MCL (ref 2.1–9.2)
NEUTROPHILS NFR BLD AUTO: 80.1 %
NITRITE UR QL STRIP.AUTO: POSITIVE
PH UR STRIP.AUTO: 5 [PH]
PLATELET # BLD AUTO: 281 X10(3)/MCL (ref 130–400)
PMV BLD AUTO: 10.4 FL (ref 7.4–10.4)
POTASSIUM SERPL-SCNC: 3.2 MMOL/L (ref 3.5–5.1)
PROT UR QL STRIP.AUTO: >=300 MG/DL
RBC # BLD AUTO: 4.66 X10(6)/MCL (ref 4.2–5.4)
RBC #/AREA URNS AUTO: ABNORMAL /HPF
RBC UR QL AUTO: NEGATIVE UNIT/L
SODIUM SERPL-SCNC: 144 MMOL/L (ref 136–145)
SP GR UR STRIP.AUTO: 1.01
SQUAMOUS #/AREA URNS AUTO: ABNORMAL /HPF
UROBILINOGEN UR STRIP-ACNC: 4 MG/DL
WBC # SPEC AUTO: 14.4 X10(3)/MCL (ref 4.5–11.5)
WBC #/AREA URNS AUTO: ABNORMAL /HPF

## 2023-01-17 PROCEDURE — 81025 URINE PREGNANCY TEST: CPT | Performed by: FAMILY MEDICINE

## 2023-01-17 PROCEDURE — 85025 COMPLETE CBC W/AUTO DIFF WBC: CPT | Performed by: FAMILY MEDICINE

## 2023-01-17 PROCEDURE — 96365 THER/PROPH/DIAG IV INF INIT: CPT

## 2023-01-17 PROCEDURE — 63600175 PHARM REV CODE 636 W HCPCS: Performed by: FAMILY MEDICINE

## 2023-01-17 PROCEDURE — 81003 URINALYSIS AUTO W/O SCOPE: CPT | Performed by: FAMILY MEDICINE

## 2023-01-17 PROCEDURE — 80048 BASIC METABOLIC PNL TOTAL CA: CPT | Performed by: FAMILY MEDICINE

## 2023-01-17 PROCEDURE — 36415 COLL VENOUS BLD VENIPUNCTURE: CPT | Performed by: FAMILY MEDICINE

## 2023-01-17 PROCEDURE — 87040 BLOOD CULTURE FOR BACTERIA: CPT | Performed by: FAMILY MEDICINE

## 2023-01-17 PROCEDURE — 25000003 PHARM REV CODE 250: Performed by: FAMILY MEDICINE

## 2023-01-17 PROCEDURE — 99285 EMERGENCY DEPT VISIT HI MDM: CPT | Mod: 25

## 2023-01-17 PROCEDURE — 96361 HYDRATE IV INFUSION ADD-ON: CPT

## 2023-01-17 PROCEDURE — 96375 TX/PRO/DX INJ NEW DRUG ADDON: CPT

## 2023-01-17 RX ORDER — LEVOFLOXACIN 500 MG/1
500 TABLET, FILM COATED ORAL DAILY
Qty: 10 TABLET | Refills: 0 | Status: SHIPPED | OUTPATIENT
Start: 2023-01-17 | End: 2023-01-27

## 2023-01-17 RX ORDER — HYDROMORPHONE HYDROCHLORIDE 2 MG/ML
1 INJECTION, SOLUTION INTRAMUSCULAR; INTRAVENOUS; SUBCUTANEOUS
Status: COMPLETED | OUTPATIENT
Start: 2023-01-17 | End: 2023-01-17

## 2023-01-17 RX ORDER — ONDANSETRON 2 MG/ML
8 INJECTION INTRAMUSCULAR; INTRAVENOUS
Status: COMPLETED | OUTPATIENT
Start: 2023-01-17 | End: 2023-01-17

## 2023-01-17 RX ADMIN — CEFTRIAXONE SODIUM 1 G: 1 INJECTION, POWDER, FOR SOLUTION INTRAMUSCULAR; INTRAVENOUS at 11:01

## 2023-01-17 RX ADMIN — SODIUM CHLORIDE 1000 ML: 9 INJECTION, SOLUTION INTRAVENOUS at 11:01

## 2023-01-17 RX ADMIN — ONDANSETRON HYDROCHLORIDE 8 MG: 2 SOLUTION INTRAMUSCULAR; INTRAVENOUS at 01:01

## 2023-01-17 RX ADMIN — HYDROMORPHONE HYDROCHLORIDE 1 MG: 2 INJECTION INTRAMUSCULAR; INTRAVENOUS; SUBCUTANEOUS at 01:01

## 2023-01-17 NOTE — Clinical Note
"Galina Rochapernell Walker was seen and treated in our emergency department on 1/17/2023.  She may return to work on 01/19/2023.       If you have any questions or concerns, please don't hesitate to call.      Dr Thao GRAHAM    "

## 2023-01-17 NOTE — Clinical Note
"Galina Rochapernell Walker was seen and treated in our emergency department on 1/17/2023.  She may return to work on 01/18/2023.       If you have any questions or concerns, please don't hesitate to call.      ab, RN    "

## 2023-01-17 NOTE — ED PROVIDER NOTES
Encounter Date: 1/17/2023       History     Chief Complaint   Patient presents with    Flank Pain     Here sat dx with uti/ increased pain to today to flank rt side     52-year-old presents with flank pain on the right side patient states she was recently diagnosed with UTI does not feel like it is getting better still having some dysuria no fever or chills just some right flank pain dysuria urgency frequency some nausea no vomiting no diarrhea has been inconsistent with medications      Review of patient's allergies indicates:   Allergen Reactions    Ibuprofen Nausea And Vomiting     Burns pt's stomach     Past Medical History:   Diagnosis Date    Pituitary mass 8/30/2022     Past Surgical History:   Procedure Laterality Date    COLECTOMY  2017    COLONOSCOPY  01/27/2022    COLONOSCOPY  12/13/2017    [facility], Rony Majano MD    NECK SURGERY       No family history on file.  Social History     Tobacco Use    Smoking status: Every Day     Packs/day: 0.50     Types: Cigarettes    Smokeless tobacco: Never   Substance Use Topics    Alcohol use: Not Currently    Drug use: Never     Review of Systems   Constitutional:  Negative for fever.   HENT:  Negative for sore throat.    Respiratory:  Negative for shortness of breath.    Cardiovascular:  Negative for chest pain.   Gastrointestinal:  Negative for nausea.   Genitourinary:  Positive for dysuria, flank pain and urgency.   Musculoskeletal:  Negative for back pain.   Skin:  Negative for rash.   Neurological:  Negative for weakness.   Hematological:  Does not bruise/bleed easily.   All other systems reviewed and are negative.    Physical Exam     Initial Vitals [01/17/23 1049]   BP Pulse Resp Temp SpO2   133/88 (!) 112 (!) 24 98.3 °F (36.8 °C) 99 %      MAP       --         Physical Exam    Nursing note and vitals reviewed.  Constitutional: She appears well-developed and well-nourished. She is active.   HENT:   Head: Normocephalic and atraumatic.   Eyes: Conjunctivae, EOM  and lids are normal. Pupils are equal, round, and reactive to light.   Neck: Trachea normal and phonation normal. Neck supple. No thyroid mass present.   Normal range of motion.  Cardiovascular:  Normal rate, regular rhythm, normal heart sounds and normal pulses.           Pulmonary/Chest: Breath sounds normal.   Abdominal: Abdomen is soft. Bowel sounds are normal.   Musculoskeletal:         General: Normal range of motion.      Cervical back: Normal range of motion and neck supple.     Neurological: She is alert and oriented to person, place, and time. She has normal strength and normal reflexes.   Skin: Skin is warm, dry and intact.   Psychiatric: She has a normal mood and affect. Her speech is normal and behavior is normal. Judgment and thought content normal. Cognition and memory are normal.       ED Course   Procedures  Labs Reviewed   URINALYSIS, REFLEX TO URINE CULTURE - Abnormal; Notable for the following components:       Result Value    Color, UA Orange (*)     Appearance, UA Slightly Cloudy (*)     Protein, UA >=300 (*)     Glucose,  (*)     Ketones, UA Trace (*)     Bilirubin, UA Small (*)     Urobilinogen, UA 4.0 (*)     Nitrites, UA Positive (*)     Leukocyte Esterase, UA Large (*)     All other components within normal limits   BASIC METABOLIC PANEL - Abnormal; Notable for the following components:    Potassium Level 3.2 (*)     Chloride 108 (*)     All other components within normal limits   CBC WITH DIFFERENTIAL - Abnormal; Notable for the following components:    WBC 14.4 (*)     MCV 99.8 (*)     MCHC 31.6 (*)     Neut # 11.56 (*)     IG# 0.09 (*)     All other components within normal limits   URINALYSIS, MICROSCOPIC - Abnormal; Notable for the following components:    Bacteria, UA Few (*)     Squamous Epithelial Cells, UA Many (*)     All other components within normal limits    Narrative:     Color may interfere with test results!!   PREGNANCY TEST, URINE RAPID - Normal   BLOOD CULTURE [facility]    BLOOD CULTURE [facility]   CBC W/ AUTO DIFFERENTIAL    Narrative:     The following orders were created for panel order CBC Auto Differential.  Procedure                               Abnormality         Status                     ---------                               -----------         ------                     CBC with Differential[[id number]]        Abnormal            Final result                 Please view results for these tests on the individual orders.          Imaging Results              CT Renal Stone Study ABD Pelvis WO (Final result)  Result time 01/17/23 13:32:55      Final result by Jamey Cyr MD (01/17/23 13:32:55)                   Impression:      Punctate nonobstructing calculus in the lower pole the right kidney    Postsurgical changes seen in the colon    Otherwise unremarkable      Electronically signed by: Jamey Cyr  Date:    01/17/2023  Time:    13:32               Narrative:    EXAMINATION:  CT RENAL STONE STUDY ABD PELVIS WO    CLINICAL HISTORY:  Flank pain, kidney stone suspected;    TECHNIQUE:  Low dose axial images, sagittal and coronal reformations were obtained from the lung bases to the pubic symphysis.  No contrast was administered.  Automatic exposure control is utilized to reduce patient radiation exposure.    COMPARISON:  None    FINDINGS:  The lung bases are clear.    The liver appears normal.  No obvious liver mass or lesion is seen.    Gallbladder appears normal.  No gallstones are seen.    The pancreas appears normal.  No inflammatory changes are seen in the pancreatic region.    The spleen appears normal and adrenal glands appear normal.  No adrenal nodule is seen.    There is a punctate nonobstructing medullary calculus in the lower pole the right kidney..  No hydronephrosis is seen.  No hydroureter is seen.  No ureteral stone is seen.    No colitis is seen.  No diverticulitis is seen.  No appendicitis is seen.  Postsurgical changes are seen in the  sigmoid and left upper quadrant.    No free air seen.  No free fluid is seen.  The urinary bladder appears normal.    Bones show no acute abnormality.                                       Medications   sodium chloride 0.9% bolus 1,000 mL 1,000 mL (1,000 mLs Intravenous New Bag 1/17/23 1157)   cefTRIAXone (ROCEPHIN) 1 g in dextrose 5 % in water (D5W) 5 % 50 mL IVPB (MB+) (0 g Intravenous Stopped 1/17/23 1234)   HYDROmorphone (PF) injection 1 mg (1 mg Intravenous Given 1/17/23 1327)   ondansetron injection 8 mg (8 mg Intravenous Given 1/17/23 1327)                 ED Course as of 01/17/23 1435   Tue Jan 17, 2023   1126 Bacteria, UA(!): Few [BL]   1126 Squam Epithel, UA(!): Many [BL]   1126 Protein, UA(!): >=300 [BL]   1126 Glucose, UA(!): 250 [BL]   1126 Ketones, UA(!): Trace [BL]   1126 Bilirubin, UA(!): Small [BL]   1126 NITRITE UA(!): Positive [BL]   1126 Leukocytes, UA(!): Large [BL]   1233 WBC(!): 14.4 [BL]   1237 Hemoglobin: 14.7 [BL]   1237 Potassium(!): 3.2 [BL]   1237 Chloride(!): 108 [BL]   1237 CO2: 24 [BL]   1237 BUN: 11.8 [BL]   1306 Preg Test, Ur: Negative [BL]      ED Course User Index  [BL] Lamine Osuna MD                 Clinical Impression:   Final diagnoses:  [R10.9] Right flank pain (Primary)  [N30.01] Acute cystitis with hematuria        ED Disposition Condition    Discharge Stable          ED Prescriptions       Medication Sig Dispense Start Date End Date Auth. Provider    levoFLOXacin (LEVAQUIN) 500 MG tablet Take 1 tablet (500 mg total) by mouth once daily. for 10 days 10 tablet 1/17/2023 1/27/2023 Lamine Osuna MD          Follow-up Information       Follow up With Specialties Details Why Contact Info    [facility] - Emergency Dept Emergency Medicine  If symptoms worsen [address] [facility] [address] [facility] [id number] [phone]             Lamine Osuna MD  01/17/23 8317

## 2023-01-17 NOTE — ED NOTES
SANTOS Be at bedside; pt is difficult stick, states she usually requires ultrasound IV; this nurse missed 1 attempt; SANTOS Be had 3 unsuccessful attempts

## 2023-01-18 ENCOUNTER — OFFICE VISIT (OUTPATIENT)
Dept: FAMILY MEDICINE | Facility: CLINIC | Age: 53
End: 2023-01-18
Payer: MEDICAID

## 2023-01-18 VITALS
RESPIRATION RATE: 18 BRPM | HEIGHT: 69 IN | TEMPERATURE: 98 F | DIASTOLIC BLOOD PRESSURE: 84 MMHG | BODY MASS INDEX: 22.49 KG/M2 | WEIGHT: 151.88 LBS | HEART RATE: 102 BPM | SYSTOLIC BLOOD PRESSURE: 133 MMHG

## 2023-01-18 DIAGNOSIS — Z11.4 ENCOUNTER FOR SCREENING FOR HIV: ICD-10-CM

## 2023-01-18 DIAGNOSIS — G89.29 CHRONIC NECK PAIN: Primary | ICD-10-CM

## 2023-01-18 DIAGNOSIS — F41.9 ANXIETY: ICD-10-CM

## 2023-01-18 DIAGNOSIS — Z12.4 ENCOUNTER FOR SCREENING FOR CERVICAL CANCER: ICD-10-CM

## 2023-01-18 DIAGNOSIS — G43.009 MIGRAINE WITHOUT AURA AND WITHOUT STATUS MIGRAINOSUS, NOT INTRACTABLE: ICD-10-CM

## 2023-01-18 DIAGNOSIS — Z11.59 NEED FOR HEPATITIS C SCREENING TEST: ICD-10-CM

## 2023-01-18 DIAGNOSIS — R20.2 NUMBNESS AND TINGLING IN LEFT ARM: ICD-10-CM

## 2023-01-18 DIAGNOSIS — R20.0 NUMBNESS AND TINGLING IN LEFT ARM: ICD-10-CM

## 2023-01-18 DIAGNOSIS — M54.2 CHRONIC NECK PAIN: Primary | ICD-10-CM

## 2023-01-18 DIAGNOSIS — Z12.31 BREAST CANCER SCREENING BY MAMMOGRAM: ICD-10-CM

## 2023-01-18 PROCEDURE — 3008F PR BODY MASS INDEX (BMI) DOCUMENTED: ICD-10-PCS | Mod: CPTII,,, | Performed by: FAMILY MEDICINE

## 2023-01-18 PROCEDURE — 3079F PR MOST RECENT DIASTOLIC BLOOD PRESSURE 80-89 MM HG: ICD-10-PCS | Mod: CPTII,,, | Performed by: FAMILY MEDICINE

## 2023-01-18 PROCEDURE — 1159F MED LIST DOCD IN RCRD: CPT | Mod: CPTII,,, | Performed by: FAMILY MEDICINE

## 2023-01-18 PROCEDURE — 99214 PR OFFICE/OUTPT VISIT, EST, LEVL IV, 30-39 MIN: ICD-10-PCS | Mod: S$PBB,,, | Performed by: FAMILY MEDICINE

## 2023-01-18 PROCEDURE — 3079F DIAST BP 80-89 MM HG: CPT | Mod: CPTII,,, | Performed by: FAMILY MEDICINE

## 2023-01-18 PROCEDURE — 1159F PR MEDICATION LIST DOCUMENTED IN MEDICAL RECORD: ICD-10-PCS | Mod: CPTII,,, | Performed by: FAMILY MEDICINE

## 2023-01-18 PROCEDURE — 99214 OFFICE O/P EST MOD 30 MIN: CPT | Mod: S$PBB,,, | Performed by: FAMILY MEDICINE

## 2023-01-18 PROCEDURE — 3075F SYST BP GE 130 - 139MM HG: CPT | Mod: CPTII,,, | Performed by: FAMILY MEDICINE

## 2023-01-18 PROCEDURE — 3075F PR MOST RECENT SYSTOLIC BLOOD PRESS GE 130-139MM HG: ICD-10-PCS | Mod: CPTII,,, | Performed by: FAMILY MEDICINE

## 2023-01-18 PROCEDURE — 99215 OFFICE O/P EST HI 40 MIN: CPT | Mod: PBBFAC | Performed by: FAMILY MEDICINE

## 2023-01-18 PROCEDURE — 3008F BODY MASS INDEX DOCD: CPT | Mod: CPTII,,, | Performed by: FAMILY MEDICINE

## 2023-01-18 RX ORDER — AMITRIPTYLINE HYDROCHLORIDE 25 MG/1
25 TABLET, FILM COATED ORAL NIGHTLY
Qty: 30 TABLET | Refills: 2 | Status: SHIPPED | OUTPATIENT
Start: 2023-01-18 | End: 2023-01-18 | Stop reason: ALTCHOICE

## 2023-01-18 RX ORDER — TIZANIDINE 4 MG/1
4 TABLET ORAL EVERY 8 HOURS PRN
Qty: 30 TABLET | Refills: 0 | Status: SHIPPED | OUTPATIENT
Start: 2023-01-18 | End: 2023-03-09

## 2023-01-18 RX ORDER — AMITRIPTYLINE HYDROCHLORIDE 25 MG/1
25 TABLET, FILM COATED ORAL NIGHTLY
Qty: 30 TABLET | Refills: 2 | Status: SHIPPED | OUTPATIENT
Start: 2023-01-18 | End: 2023-05-01

## 2023-01-18 NOTE — PROGRESS NOTES
"Patient Name: Galina Walker   : 1970  MRN: [id number]     SUBJECTIVE:  Galina Walker is a 52 y.o. female here for Follow-up  .    HPI  PMHx of chronic left shoulder, s/p injection, anxiety, pituitary mass following with neurosurgery, migraine, colon cancer (s/p part. colectomy, never received radiation or chemo) who presents for follow up.  GI appt last one was last year. Had colonoscopy. Said to come back in 5 years for colonoscopy. Will have an appt this year. diagnosed at age 30's. Father passed away at age 40's    -ER follow up  Recently diagnosed with UTI. Was in ER yesterday. Had workup done with labs and CT which only showed a non obstructing stone of right kidney. Was given rocephin and Prescribed levofloxacin upon discharge. Has also pyridium   Continues to have the low abdominal pain/pressure and dysuria  Compliant with med and no side effect so far    - Chronic neck pain and shoulder  Radiating pain from neck to shoulder but numbs the whole forearm and hand  Last year the flare up resumed. No iniciating event, no trauma. 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 Referred to PT and was told that if no improvement after PT will consider MRI. She went to PT but was told that doing PT would exacerbate the neck issues and that she needs to see someone for that.  Eye doctor check: last year. Needs to folllow      - Migraine once or twice a week  Right sided, pounding. Emesis.no aura  Excedrin helps a bit  Used to be on topamax, and abortive meds. Stopped working and was told to do injections but refused them.  Saw neurosurgery via telehealth but was " "disconnected and no contact since then    - anxiety  Buspar not helping  Compliant  No panic attacks. No SI  Does have some depressed mood too  Sleep issues as well.    Needs appt with gynecology      ALLERGIES:   Review of patient's allergies indicates:   Allergen Reactions    Ibuprofen Nausea And Vomiting     Burns pt's stomach         ROS:  Review of Systems   Constitutional:  Negative for chills, fever and weight loss.   HENT:  Negative for congestion, ear pain and sore throat.    Eyes:  Positive for blurred vision (chronic). Negative for pain.   Respiratory:  Negative for cough, shortness of breath and wheezing.    Cardiovascular:  Negative for chest pain, palpitations, leg swelling and PND.   Gastrointestinal:  Positive for abdominal pain and nausea. Negative for blood in stool, constipation, diarrhea and vomiting.   Genitourinary:  Positive for dysuria. Negative for flank pain and hematuria.   Musculoskeletal:  Positive for joint pain and neck pain. Negative for falls and myalgias.   Skin:  Negative for rash.   Neurological:  Positive for headaches. Negative for dizziness and focal weakness.   Psychiatric/Behavioral:  Negative for depression and substance abuse. The patient is nervous/anxious and has insomnia.        OBJECTIVE:  Vital signs  Vitals:    01/18/23 1313   BP: 133/84   Pulse: 102   Resp: 18   Temp: 98.2 °F (36.8 °C)   Weight: 68.9 kg (151 lb 14.4 oz)   Height: 5' 9" (1.753 m)      Body mass index is 22.43 kg/m².    PHYSICAL EXAM:   Physical Exam  Vitals reviewed.   Constitutional:       General: She is not in acute distress.     Appearance: Normal appearance. She is not ill-appearing.   HENT:      Head: Normocephalic and atraumatic.      Right Ear: External ear normal.      Left Ear: External ear normal.      Nose: Nose normal. No rhinorrhea.      Mouth/Throat:      Mouth: Mucous membranes are moist.   Eyes:      General: No scleral icterus.        Right eye: No discharge.         Left eye: No " discharge.      Conjunctiva/sclera: Conjunctivae normal.      Pupils: Pupils are equal, round, and reactive to light.   Cardiovascular:      Rate and Rhythm: Normal rate and regular rhythm.      Heart sounds: No murmur heard.  Pulmonary:      Effort: Pulmonary effort is normal. No respiratory distress.      Breath sounds: No wheezing, rhonchi or rales.   Abdominal:      General: Bowel sounds are normal. There is no distension.      Palpations: Abdomen is soft.      Tenderness: There is abdominal tenderness (lower abdomen).   Musculoskeletal:         General: No swelling. Normal range of motion.      Cervical back: Normal range of motion and neck supple. Tenderness (left lateral. spurling test positive) present. No rigidity.      Right lower leg: No edema.      Left lower leg: No edema.   Skin:     General: Skin is warm.      Coloration: Skin is not pale.      Findings: No rash.   Neurological:      General: No focal deficit present.      Mental Status: She is alert and oriented to person, place, and time.      Sensory: No sensory deficit (but does feel subjective numbness with certain neck movement down to her left arm/hand).      Motor: No weakness.   Psychiatric:         Mood and Affect: Mood is depressed.         Behavior: Behavior normal.        ASSESSMENT/PLAN:  1. Chronic neck pain  Has hx of neck surgery in the past. Flared up this past year. Seems radiculopathy. Referred to neurosurgery. NSAIDs/tylenol and zanaflex in the meantime.  -     Ambulatory referral/consult to Neurosurgery; Future; Expected date: 01/25/2023  -     tiZANidine (ZANAFLEX) 4 MG tablet; Take 1 tablet (4 mg total) by mouth every 8 (eight) hours as needed (spasms, pain).  Dispense: 30 tablet; Refill: 0    2. Numbness and tingling in left arm  As above.   -     Ambulatory referral/consult to Neurosurgery; Future; Expected date: 01/25/2023    3. Anxiety    Not controlled. Buspar stopped as it was not working. Start elavil for  antianxiety/antidepressive effects in addition to the hope to help prevent migraines.  -     amitriptyline (ELAVIL) 25 MG tablet; Take 1 tablet (25 mg total) by mouth every evening.  Dispense: 30 tablet; Refill: 2    4. Migraine without aura and without status migrainosus, not intractable  Follow up with neurology and elavil for prevention.    5. Need for hepatitis C screening test  -     Hepatitis C Antibody; Future; Expected date: 01/18/2023    6. Encounter for screening for HIV  -     HIV 1/2 Ag/Ab (4th Gen); Future; Expected date: 01/18/2023    7. Breast cancer screening by mammogram  -     Mammo Digital Screening Bilat w/ Gerald; Future; Expected date: 01/18/2023    8. Encounter for screening for cervical cancer  -     Ambulatory referral/consult to Gynecology; Future; Expected date: 01/25/2023           RESULTS:  Recent Results (from the past 1008 hour(s))   Urinalysis, Reflex to Urine Culture Urine, Clean Catch    Collection Time: 01/14/23  3:54 PM    Specimen: Urine   Result Value Ref Range    Color, UA Yellow Yellow, Light-Yellow, Dark Yellow, Sima, Straw    Appearance, UA Clear Clear    Specific Gravity, UA 1.020     pH, UA 6.5 5.0 - 8.5    Protein, UA Negative Negative mg/dL    Glucose, UA Negative Negative, Normal mg/dL    Ketones, UA 15 (A) Negative mg/dL    Blood, UA Trace-Intact (A) Negative unit/L    Bilirubin, UA Negative Negative mg/dL    Urobilinogen, UA 0.2 0.2, 1.0, Normal mg/dL    Nitrites, UA Negative Negative    Leukocyte Esterase, UA Trace (A) Negative unit/L   Urinalysis, Microscopic    Collection Time: 01/14/23  3:54 PM   Result Value Ref Range    Bacteria, UA Many (A) None Seen, Rare, Occasional /HPF    RBC, UA None Seen None Seen, 0-2, 3-5, 0-5 /HPF    WBC, UA 6-10 (A) None Seen, 0-2, 3-5, 0-5 /HPF    Squamous Epithelial Cells, UA Occasional None Seen, Rare, Occasional, Occ /HPF   Urinalysis, Reflex to Urine Culture    Collection Time: 01/17/23 11:01 AM    Specimen: Urine   Result Value  Ref Range    Color, UA Orange (A) Yellow, Light-Yellow, Dark Yellow, Sima, Straw    Appearance, UA Slightly Cloudy (A) Clear    Specific Gravity, UA 1.010     pH, UA 5.0 5.0 - 8.5    Protein, UA >=300 (A) Negative mg/dL    Glucose,  (A) Negative, Normal mg/dL    Ketones, UA Trace (A) Negative mg/dL    Blood, UA Negative Negative unit/L    Bilirubin, UA Small (A) Negative mg/dL    Urobilinogen, UA 4.0 (A) 0.2, 1.0, Normal mg/dL    Nitrites, UA Positive (A) Negative    Leukocyte Esterase, UA Large (A) Negative unit/L   Pregnancy, urine rapid    Collection Time: 01/17/23 11:01 AM   Result Value Ref Range    Beta hCG Qualitative, Urine Negative Negative   Urinalysis, Microscopic    Collection Time: 01/17/23 11:01 AM   Result Value Ref Range    Bacteria, UA Few (A) None Seen, Rare, Occasional /HPF    RBC, UA 0-2 None Seen, 0-2, 3-5, 0-5 /HPF    WBC, UA 0-5 None Seen, 0-2, 3-5, 0-5 /HPF    Squamous Epithelial Cells, UA Many (A) None Seen, Rare, Occasional, Occ /HPF   Basic Metabolic Panel    Collection Time: 01/17/23 11:34 AM   Result Value Ref Range    Sodium Level 144 136 - 145 mmol/L    Potassium Level 3.2 (L) 3.5 - 5.1 mmol/L    Chloride 108 (H) 98 - 107 mmol/L    Carbon Dioxide 24 22 - 29 mmol/L    Glucose Level 100 74 - 100 mg/dL    Blood Urea Nitrogen 11.8 9.8 - 20.1 mg/dL    Creatinine 0.89 0.55 - 1.02 mg/dL    BUN/Creatinine Ratio 13     Calcium Level Total 10.1 8.4 - 10.2 mg/dL    Anion Gap 12.0 mEq/L    eGFR >60 mls/min/1.73/m2   CBC with Differential    Collection Time: 01/17/23 11:34 AM   Result Value Ref Range    WBC 14.4 (H) 4.5 - 11.5 x10(3)/mcL    RBC 4.66 4.20 - 5.40 x10(6)/mcL    Hgb 14.7 12.0 - 16.0 gm/dL    Hct 46.5 37.0 - 47.0 %    MCV 99.8 (H) 80.0 - 94.0 fL    MCH 31.5 pg    MCHC 31.6 (L) 33.0 - 36.0 mg/dL    RDW 13.2 11.5 - 17.0 %    Platelet 281 130 - 400 x10(3)/mcL    MPV 10.4 7.4 - 10.4 fL    Neut % 80.1 %    Lymph % 12.2 %    Mono % 6.5 %    Eos % 0.5 %    Basophil % 0.1 %    Lymph #  1.76 0.6 - 4.6 x10(3)/mcL    Neut # 11.56 (H) 2.1 - 9.2 x10(3)/mcL    Mono # 0.94 0.1 - 1.3 x10(3)/mcL    Eos # 0.07 0 - 0.9 x10(3)/mcL    Baso # 0.01 0 - 0.2 x10(3)/mcL    IG# 0.09 (H) 0 - 0.04 x10(3)/mcL    IG% 0.6 %         Follow Up:  Follow up in about 1 month (around 2/18/2023) for anxiety.         This note was created with the assistance of a voice recognition software or phone dictation. There may be transcription errors as a result of using this technology however minimal. Effort has been made to assure accuracy of transcription but any obvious errors or omissions should be clarified with the author of the document

## 2023-01-22 LAB
BACTERIA BLD CULT: NORMAL
BACTERIA BLD CULT: NORMAL

## 2023-03-01 DIAGNOSIS — R20.0 NUMBNESS AND TINGLING IN LEFT ARM: Primary | ICD-10-CM

## 2023-03-01 DIAGNOSIS — R20.2 NUMBNESS AND TINGLING IN LEFT ARM: Primary | ICD-10-CM

## 2023-03-01 DIAGNOSIS — M54.12 CERVICAL RADICULOPATHY: ICD-10-CM

## 2023-03-01 DIAGNOSIS — G89.29 CHRONIC NECK PAIN: ICD-10-CM

## 2023-03-01 DIAGNOSIS — M54.2 CERVICALGIA: ICD-10-CM

## 2023-03-01 DIAGNOSIS — M54.2 CHRONIC NECK PAIN: ICD-10-CM

## 2023-03-26 ENCOUNTER — HOSPITAL ENCOUNTER (EMERGENCY)
Facility: HOSPITAL | Age: 53
Discharge: HOME | End: 2023-03-27
Attending: SPECIALIST
Payer: MEDICAID

## 2023-03-26 DIAGNOSIS — M54.2 CHRONIC NECK PAIN: Primary | ICD-10-CM

## 2023-03-26 DIAGNOSIS — G89.29 CHRONIC NECK PAIN: Primary | ICD-10-CM

## 2023-03-26 DIAGNOSIS — H53.8 BLURRED VISION: ICD-10-CM

## 2023-03-26 LAB
ALBUMIN SERPL-MCNC: 4 G/DL (ref 3.5–5)
ALBUMIN/GLOB SERPL: 1.3 RATIO (ref 1.1–2)
ALP SERPL-CCNC: 51 UNIT/L (ref 40–150)
ALT SERPL-CCNC: 14 UNIT/L (ref 0–55)
AMPHET UR QL SCN: NEGATIVE
AST SERPL-CCNC: 18 UNIT/L (ref 5–34)
BARBITURATE SCN PRESENT UR: NEGATIVE
BASOPHILS # BLD AUTO: 0.02 X10(3)/MCL (ref 0–0.2)
BASOPHILS NFR BLD AUTO: 0.2 %
BENZODIAZ UR QL SCN: NEGATIVE
BILIRUBIN DIRECT+TOT PNL SERPL-MCNC: 0.5 MG/DL
BUN SERPL-MCNC: 12.5 MG/DL (ref 9.8–20.1)
CALCIUM SERPL-MCNC: 9.3 MG/DL (ref 8.4–10.2)
CANNABINOIDS UR QL SCN: POSITIVE
CHLORIDE SERPL-SCNC: 105 MMOL/L (ref 98–107)
CO2 SERPL-SCNC: 23 MMOL/L (ref 22–29)
COCAINE UR QL SCN: NEGATIVE
CREAT SERPL-MCNC: 0.7 MG/DL (ref 0.55–1.02)
EOSINOPHIL # BLD AUTO: 0.13 X10(3)/MCL (ref 0–0.9)
EOSINOPHIL NFR BLD AUTO: 1.6 %
ERYTHROCYTE [DISTWIDTH] IN BLOOD BY AUTOMATED COUNT: 12.5 % (ref 11.5–17)
GFR SERPLBLD CREATININE-BSD FMLA CKD-EPI: >60 MLS/MIN/1.73/M2
GLOBULIN SER-MCNC: 3.1 GM/DL (ref 2.4–3.5)
GLUCOSE SERPL-MCNC: 96 MG/DL (ref 74–100)
HCT VFR BLD AUTO: 49.3 % (ref 37–47)
HGB BLD-MCNC: 14.9 G/DL (ref 12–16)
IMM GRANULOCYTES # BLD AUTO: 0.05 X10(3)/MCL (ref 0–0.04)
IMM GRANULOCYTES NFR BLD AUTO: 0.6 %
INR BLD: 1.03 (ref 0–1.3)
LYMPHOCYTES # BLD AUTO: 3.03 X10(3)/MCL (ref 0.6–4.6)
LYMPHOCYTES NFR BLD AUTO: 37.4 %
MCH RBC QN AUTO: 31.2 PG (ref 27–31)
MCHC RBC AUTO-ENTMCNC: 30.2 G/DL (ref 33–36)
MCV RBC AUTO: 103.4 FL (ref 80–94)
MONOCYTES # BLD AUTO: 0.5 X10(3)/MCL (ref 0.1–1.3)
MONOCYTES NFR BLD AUTO: 6.2 %
NEUTROPHILS # BLD AUTO: 4.37 X10(3)/MCL (ref 2.1–9.2)
NEUTROPHILS NFR BLD AUTO: 54 %
OPIATES UR QL SCN: POSITIVE
PCP UR QL: NEGATIVE
PH UR: 5.5 [PH] (ref 3–11)
PLATELET # BLD AUTO: 212 X10(3)/MCL (ref 130–400)
PMV BLD AUTO: 10.2 FL (ref 7.4–10.4)
POC CARDIAC TROPONIN I: 0.03 NG/ML (ref 0–0.08)
POCT GLUCOSE: 86 MG/DL (ref 70–110)
POTASSIUM SERPL-SCNC: 3.7 MMOL/L (ref 3.5–5.1)
PROT SERPL-MCNC: 7.1 GM/DL (ref 6.4–8.3)
PROTHROMBIN TIME: 10.4 SECONDS (ref 12.5–14.5)
RBC # BLD AUTO: 4.77 X10(6)/MCL (ref 4.2–5.4)
SAMPLE: NORMAL
SODIUM SERPL-SCNC: 142 MMOL/L (ref 136–145)
SPECIFIC GRAVITY, URINE AUTO (.000) (OHS): >=1.03 (ref 1–1.03)
WBC # SPEC AUTO: 8.1 X10(3)/MCL (ref 4.5–11.5)

## 2023-03-26 PROCEDURE — 85025 COMPLETE CBC W/AUTO DIFF WBC: CPT | Performed by: SPECIALIST

## 2023-03-26 PROCEDURE — 93010 ELECTROCARDIOGRAM REPORT: CPT | Mod: ,,, | Performed by: INTERNAL MEDICINE

## 2023-03-26 PROCEDURE — 93005 ELECTROCARDIOGRAM TRACING: CPT

## 2023-03-26 PROCEDURE — 80307 DRUG TEST PRSMV CHEM ANLYZR: CPT | Performed by: SPECIALIST

## 2023-03-26 PROCEDURE — 96374 THER/PROPH/DIAG INJ IV PUSH: CPT

## 2023-03-26 PROCEDURE — 84484 ASSAY OF TROPONIN QUANT: CPT

## 2023-03-26 PROCEDURE — 93010 EKG 12-LEAD: ICD-10-PCS | Mod: ,,, | Performed by: INTERNAL MEDICINE

## 2023-03-26 PROCEDURE — 85610 PROTHROMBIN TIME: CPT | Performed by: SPECIALIST

## 2023-03-26 PROCEDURE — 99285 EMERGENCY DEPT VISIT HI MDM: CPT | Mod: 25

## 2023-03-26 PROCEDURE — 80053 COMPREHEN METABOLIC PANEL: CPT | Performed by: SPECIALIST

## 2023-03-26 PROCEDURE — 82962 GLUCOSE BLOOD TEST: CPT

## 2023-03-26 PROCEDURE — 63600175 PHARM REV CODE 636 W HCPCS: Performed by: SPECIALIST

## 2023-03-26 PROCEDURE — 81001 URINALYSIS AUTO W/SCOPE: CPT | Mod: 59 | Performed by: SPECIALIST

## 2023-03-26 RX ORDER — MORPHINE SULFATE 4 MG/ML
4 INJECTION, SOLUTION INTRAMUSCULAR; INTRAVENOUS
Status: COMPLETED | OUTPATIENT
Start: 2023-03-26 | End: 2023-03-26

## 2023-03-26 RX ADMIN — MORPHINE SULFATE 4 MG: 4 INJECTION INTRAVENOUS at 11:03

## 2023-03-26 NOTE — Clinical Note
"Galina Rochapernell Walker was seen and treated in our emergency department on 3/26/2023.  She may return to work on 03/28/2023.       If you have any questions or concerns, please don't hesitate to call.      JIMENEZ Ewing RN    "

## 2023-03-27 VITALS
SYSTOLIC BLOOD PRESSURE: 124 MMHG | DIASTOLIC BLOOD PRESSURE: 69 MMHG | HEART RATE: 79 BPM | OXYGEN SATURATION: 96 % | RESPIRATION RATE: 18 BRPM

## 2023-03-27 LAB
APPEARANCE UR: CLEAR
BACTERIA #/AREA URNS AUTO: ABNORMAL /HPF
BILIRUB UR QL STRIP.AUTO: NEGATIVE MG/DL
COLOR UR AUTO: YELLOW
GLUCOSE UR QL STRIP.AUTO: NEGATIVE MG/DL
KETONES UR QL STRIP.AUTO: NEGATIVE MG/DL
LEUKOCYTE ESTERASE UR QL STRIP.AUTO: NEGATIVE UNIT/L
NITRITE UR QL STRIP.AUTO: NEGATIVE
PH UR STRIP.AUTO: 5.5 [PH]
PROT UR QL STRIP.AUTO: NEGATIVE MG/DL
RBC #/AREA URNS AUTO: ABNORMAL /HPF
RBC UR QL AUTO: ABNORMAL UNIT/L
SP GR UR STRIP.AUTO: >=1.03
SQUAMOUS #/AREA URNS AUTO: ABNORMAL /HPF
UROBILINOGEN UR STRIP-ACNC: 0.2 MG/DL
WBC #/AREA URNS AUTO: ABNORMAL /HPF

## 2023-03-27 RX ORDER — CYCLOBENZAPRINE HCL 10 MG
10 TABLET ORAL 3 TIMES DAILY PRN
Qty: 30 TABLET | Refills: 0 | Status: SHIPPED | OUTPATIENT
Start: 2023-03-27 | End: 2023-04-01

## 2023-03-27 NOTE — ED NOTES
Patient c/o neck pain that radiates to LUE and LLE. She has numbness and decreased sensation to LUE and LLR that she reports has been ongoing and chronic for over a year. She reports the only thing that is new is that her vision change. She reports she started having double and blurry vision around 1900. She wears eyeglasses. She reports that she had problems with her eyes before in the past but it has been a while. She reports her pain is 8/10 to her neck. No acute distress noted

## 2023-03-27 NOTE — ED PROVIDER NOTES
Encounter Date: 3/26/2023       History     Chief Complaint   Patient presents with    blured vision     Patient amb to ED 2 c/o blurred vision started at 1930 weakness to bilateral arms and legs neck shoulder and back pain chronic states unable to see in all 4 fields no aphasia left arm and leg states less sensation no facial droop arm drift noted to all four extremities  Code Fast called CBG 88     Patient presents to the emergency room with blurred vision she states began about 2 hours ago and states it comes and goes; no vision loss; she also reports chronic neck pain, shoulder pain and back pain; she has slight weakness in her  but this has been chronic; due to her new onset change in vision a code fast was called by nursing staff and patient was sent for CT of the brain    The history is provided by the patient.   Review of patient's allergies indicates:   Allergen Reactions    Ibuprofen Nausea And Vomiting     Burns pt's stomach     Past Medical History:   Diagnosis Date    Pituitary mass 2022     Past Surgical History:   Procedure Laterality Date     SECTION  2006    COLECTOMY  2017    COLONOSCOPY  2022    COLONOSCOPY  2017    [facility], Rony Majano MD    NECK SURGERY      TUBAL LIGATION  2006     Family History   Problem Relation Age of Onset    Ovarian cancer Mother 48    Arthritis Father     Colon cancer Father         age 40     Social History     Tobacco Use    Smoking status: Every Day     Packs/day: 1.00     Types: Cigarettes    Smokeless tobacco: Never   Substance Use Topics    Alcohol use: Not Currently    Drug use: Never     Review of Systems   Constitutional: Negative.    HENT: Negative.     Respiratory: Negative.     Cardiovascular: Negative.    Gastrointestinal: Negative.    Musculoskeletal:  Positive for arthralgias, back pain and neck pain.   Skin: Negative.    Neurological: Negative.    All other systems reviewed and are negative.    Physical Exam      Initial Vitals   BP Pulse Resp Temp SpO2   03/26/23 2119 03/26/23 2254 03/26/23 2340 -- 03/26/23 2254   (!) 156/122 76 18  99 %      MAP       --                Physical Exam    Nursing note and vitals reviewed.  Constitutional: She appears well-developed and well-nourished.   HENT:   Head: Normocephalic and atraumatic.   Eyes: Conjunctivae and EOM are normal. Pupils are equal, round, and reactive to light.   Neck: Neck supple.   Paraspinous tenderness, no spinous tenderness, reduced range of motion   Normal range of motion.  Cardiovascular:  Normal rate, regular rhythm, normal heart sounds and intact distal pulses.           Pulmonary/Chest: Breath sounds normal.   Abdominal: Abdomen is soft.   Musculoskeletal:         General: Normal range of motion.      Cervical back: Normal range of motion and neck supple.      Comments: Bilateral lumbar paraspinous tenderness, no spinous tenderness, no swelling      Neurological: She is alert and oriented to person, place, and time.   Patient has slight reduction in strength with bilateral upper extremities and bilateral lower extremities   Skin: Skin is warm and dry.       ED Course   Procedures  Labs Reviewed   DRUG SCREEN, URINE (BEAKER) - Abnormal; Notable for the following components:       Result Value    Cannabinoids, Urine Positive (*)     Opiates, Urine Positive (*)     All other components within normal limits    Narrative:     Cut off concentrations:    Amphetamines - 1000 ng/ml  Barbiturates - 200 ng/ml  Benzodiazepine - 200 ng/ml  Cannabinoids (THC) - 50 ng/ml  Cocaine - 300 ng/ml  Fentanyl - 1.0 ng/ml  MDMA - 500 ng/ml  Opiates - 300 ng/ml   Phencyclidine (PCP) - 25 ng/ml    Specimen submitted for drug analysis and tested for pH and specific gravity in order to evaluate sample integrity. Suspect tampering if specific gravity is <1.003 and/or pH is not within the range of 4.5 - 8.0  False negatives may result form substances such as bleach added to  urine.  False positives may result for the presence of a substance with similar chemical structure to the drug or its metabolite.    This test provides only a PRELIMINARY analytical test result. A more specific alternate chemical method must be used in order to obtain a confirmed analytical result. Gas chromatography/mass spectrometry (GC/MS) is the preferred confirmatory method. Other chemical confirmation methods are available. Clinical consideration and professional judgement should be applied to any drug of abuse test result, particularly when preliminary positive results are used.    Positive results will be confirmed only at the physicians request. Unconfirmed screening results are to be used only for medical purposes (treatment).        PROTIME-INR - Abnormal; Notable for the following components:    PT 10.4 (*)     All other components within normal limits   URINALYSIS, REFLEX TO URINE CULTURE - Abnormal; Notable for the following components:    Blood, UA Trace-Lysed (*)     All other components within normal limits   CBC WITH DIFFERENTIAL - Abnormal; Notable for the following components:    Hct 49.3 (*)     .4 (*)     MCH 31.2 (*)     MCHC 30.2 (*)     IG# 0.05 (*)     All other components within normal limits   URINALYSIS, MICROSCOPIC - Abnormal; Notable for the following components:    Bacteria, UA Many (*)     Squamous Epithelial Cells, UA Moderate (*)     All other components within normal limits   CBC W/ AUTO DIFFERENTIAL    Narrative:     The following orders were created for panel order CBC auto differential.  Procedure                               Abnormality         Status                     ---------                               -----------         ------                     CBC with Differential[[id number]]        Abnormal            Final result                 Please view results for these tests on the individual orders.   COMPREHENSIVE METABOLIC PANEL    Narrative:     Told DR. APPIAH  DIAL at 2306 on 03.26.2023 about corrections on Galina Walker.       TROPONIN ISTAT   POCT GLUCOSE   POCT TROPONIN     EKG Readings: (Independently Interpreted)   Rhythm: Normal Sinus Rhythm. Heart Rate: 65. Ectopy: No Ectopy. Conduction: Normal. ST Segments: Normal ST Segments. T Waves: Normal. Clinical Impression: Normal Sinus Rhythm     Imaging Results              CT Cervical Spine Without Contrast (Preliminary result)  Result time 03/26/23 21:42:48      Preliminary result by Dell Bowman MD (03/26/23 21:42:48)                   Narrative:    START OF REPORT:  Technique: CT of the cervical spine was performed without intravenous contrast with axial as well as sagittal and coronal images.    Comparison: None.    Dosage Information: Automated Exposure Control was utilized 116.95 mGy.cm.    Clinical history: Weakness to bilateral arms and legs neck shoulder and back pain chronic.    Findings:  Position: Supine.  Lung apices: The visualized lung apices appear unremarkable.  Spine:  Spinal canal: The spinal canal appears unremarkable.  Mineralization: Within normal limits for age.  Rotation: No significant rotation is seen.  Scoliosis: No significant scoliosis is seen.  Vertebral Fusion: Chronic post surgical appearing bony fusion is seen at C4-C5.  Listhesis: No significant listhesis is identified.  Lordosis: The cervical lordosis is maintained.  Intervertebral disc spaces: The intervertebral discs are preserved throughout except at the C4-C5 level with the surgical fusion.  Osteophytes: Moderate multilevel endplate osteophytes are seen.  Endplate Sclerosis: Mild multilevel endplate sclerosis is seen.  Uncovertebral degenerative changes: Mild multilevel uncovertebral joint arthrosis is seen.  Facet degenerative changes: Mild multilevel facet degenerative changes are seen.  Fractures: No acute cervical spine fracture dislocation or subluxation is seen.  Orthopedic Hardware: Orthopedic hardware is noted at  C4-C5 level.    Miscellaneous:  Mastoid air cells: The visualized mastoid air cells appear clear.  Soft Tissues: Unremarkable.      Impression:  1. No acute cervical spine fracture dislocation or subluxation is seen.  2. Degenerative and post surgical changes and other details as above.                          Preliminary result by Interface, Rad Results In (03/26/23 21:42:48)                   Narrative:    START OF REPORT:  Technique: CT of the cervical spine was performed without intravenous contrast with axial as well as sagittal and coronal images.    Comparison: None.    Dosage Information: Automated Exposure Control was utilized 116.95 mGy.cm.    Clinical history: Weakness to bilateral arms and legs neck shoulder and back pain chronic.    Findings:  Position: Supine.  Lung apices: The visualized lung apices appear unremarkable.  Spine:  Spinal canal: The spinal canal appears unremarkable.  Mineralization: Within normal limits for age.  Rotation: No significant rotation is seen.  Scoliosis: No significant scoliosis is seen.  Vertebral Fusion: Chronic post surgical appearing bony fusion is seen at C4-C5.  Listhesis: No significant listhesis is identified.  Lordosis: The cervical lordosis is maintained.  Intervertebral disc spaces: The intervertebral discs are preserved throughout except at the C4-C5 level with the surgical fusion.  Osteophytes: Moderate multilevel endplate osteophytes are seen.  Endplate Sclerosis: Mild multilevel endplate sclerosis is seen.  Uncovertebral degenerative changes: Mild multilevel uncovertebral joint arthrosis is seen.  Facet degenerative changes: Mild multilevel facet degenerative changes are seen.  Fractures: No acute cervical spine fracture dislocation or subluxation is seen.  Orthopedic Hardware: Orthopedic hardware is noted at C4-C5 level.    Miscellaneous:  Mastoid air cells: The visualized mastoid air cells appear clear.  Soft Tissues: Unremarkable.      Impression:  1. No  acute cervical spine fracture dislocation or subluxation is seen.  2. Degenerative and post surgical changes and other details as above.                                         CT Head Without Contrast (Preliminary result)  Result time 03/26/23 21:25:29      Preliminary result by Dell Bowman MD (03/26/23 21:25:29)                   Narrative:    START OF REPORT:  Technique: CT of the head was performed without intravenous contrast with axial as well as coronal and sagittal images.    Comparison: None.    Dosage Information: Automated Exposure Control was utilized 1105.1 mGy.cm.    Clinical history: Patient amb to ED 2 c/o blurred vision started at 1930 weakness to bilateral arms and legs ceck shoulder and back pain ).    Findings:  Hemorrhage: No acute intracranial hemorrhage is seen.  CSF spaces: The ventricles sulci and basal cisterns are within normal limits.  Brain parenchyma: Unremarkable with preservation of the grey white junction throughout.  Cerebellum: Unremarkable.  Vascular: Mild atheromatous calcification of the intracranial arteries is seen.  Sella and skull base: The sella appears to be within normal limits for age.  Intracranial calcifications: Incidental note is made of right choroid plexus calcification. Incidental note is made of some pineal region calcification. Incidental note is made of some calcification of the falx.  Calvarium: No acute linear or depressed skull fracture is seen.    Maxillofacial Structures:  Paranasal sinuses: The visualized paranasal sinuses appear clear with no mucoperiosteal thickening or air fluid levels identified.  Orbits: The orbits appear unremarkable.  Zygomatic arches: The zygomatic arches are intact and unremarkable.  Temporal bones and mastoids: The temporal bones and mastoids appear unremarkable.  TMJ: The mandibular condyles appear normally placed with respect to the mandibular fossa.      Impression:  1. No acute intracranial process identified. Details as  above.                          Preliminary result by Interface, Rad Results In (03/26/23 21:25:29)                   Narrative:    START OF REPORT:  Technique: CT of the head was performed without intravenous contrast with axial as well as coronal and sagittal images.    Comparison: None.    Dosage Information: Automated Exposure Control was utilized 1105.1 mGy.cm.    Clinical history: Patient amb to ED 2 c/o blurred vision started at 1930 weakness to bilateral arms and legs ceck shoulder and back pain ).    Findings:  Hemorrhage: No acute intracranial hemorrhage is seen.  CSF spaces: The ventricles sulci and basal cisterns are within normal limits.  Brain parenchyma: Unremarkable with preservation of the grey white junction throughout.  Cerebellum: Unremarkable.  Vascular: Mild atheromatous calcification of the intracranial arteries is seen.  Sella and skull base: The sella appears to be within normal limits for age.  Intracranial calcifications: Incidental note is made of right choroid plexus calcification. Incidental note is made of some pineal region calcification. Incidental note is made of some calcification of the falx.  Calvarium: No acute linear or depressed skull fracture is seen.    Maxillofacial Structures:  Paranasal sinuses: The visualized paranasal sinuses appear clear with no mucoperiosteal thickening or air fluid levels identified.  Orbits: The orbits appear unremarkable.  Zygomatic arches: The zygomatic arches are intact and unremarkable.  Temporal bones and mastoids: The temporal bones and mastoids appear unremarkable.  TMJ: The mandibular condyles appear normally placed with respect to the mandibular fossa.      Impression:  1. No acute intracranial process identified. Details as above.                                         Medications   morphine injection 4 mg (4 mg Intravenous Given 3/26/23 2340)     Medical Decision Making:   Initial Assessment:   Patient presents with blurred vision that  comes and goes and chronic neck back and shoulder pain and chronic weakness  Differential Diagnosis:   Chronic pain, visual changes  Clinical Tests:   Lab Tests: Ordered and Reviewed  Radiological Study: Ordered and Reviewed  ED Management:  Patient's vision improved as her pain was treated; CT of the brain was normal; labs were unremarkable; encouraged follow up with her primary care physician               Patient Vitals for the past 24 hrs:   BP Pulse Resp SpO2   03/26/23 2340 -- -- 18 --   03/26/23 2254 126/78 76 -- 99 %   03/26/23 2119 (!) 156/122 -- -- --   The patient is resting comfortably and in no acute distress.  She states that her symptoms have improved after treatment in Emergency Department. I personally discussed her test results and treatment plan.  Gave strict ED precautions.  Specific conditions for return to the emergency department and importance of follow up with her primary care provided or the physician listed on the discharge instructions.  Patient voices understanding and agrees to the plan discussed. All patients' questions have been answered at this time.   She has remained hemodynamically stable throughout entire stay in ED and is stable for discharge home.            Clinical Impression:   Final diagnoses:  [H53.8] Blurred vision  [M54.2, G89.29] Chronic neck pain (Primary)        ED Disposition Condition    Discharge Stable          ED Prescriptions       Medication Sig Dispense Start Date End Date Auth. Provider    cyclobenzaprine (FLEXERIL) 10 MG tablet Take 1 tablet (10 mg total) by mouth 3 (three) times daily as needed for Muscle spasms. 30 tablet 3/27/2023 4/1/2023 Colton Srinivasan MD          Follow-up Information       Follow up With Specialties Details Why Contact Info    Aisha Preston MD Family Medicine In 1 week  [address] [facility] [id number] [phone]      Ophthalmology  Schedule an appointment as soon as possible for a visit                Colton Srinivasan  MD  03/27/23 0044

## 2023-03-28 ENCOUNTER — PATIENT MESSAGE (OUTPATIENT)
Dept: RESEARCH | Facility: HOSPITAL | Age: 53
End: 2023-03-28
Payer: MEDICAID

## 2023-04-04 ENCOUNTER — OFFICE VISIT (OUTPATIENT)
Dept: OPHTHALMOLOGY | Facility: CLINIC | Age: 53
End: 2023-04-04
Payer: MEDICAID

## 2023-04-04 VITALS — BODY MASS INDEX: 21.48 KG/M2 | HEIGHT: 69 IN | WEIGHT: 145 LBS

## 2023-04-04 DIAGNOSIS — H52.13 MYOPIA WITH ASTIGMATISM, BILATERAL: ICD-10-CM

## 2023-04-04 DIAGNOSIS — H57.89 THYROID EYE DISEASE: Primary | ICD-10-CM

## 2023-04-04 DIAGNOSIS — H52.203 MYOPIA WITH ASTIGMATISM, BILATERAL: ICD-10-CM

## 2023-04-04 DIAGNOSIS — H25.13 AGE-RELATED NUCLEAR CATARACT OF BOTH EYES: ICD-10-CM

## 2023-04-04 DIAGNOSIS — E07.9 THYROID EYE DISEASE: Primary | ICD-10-CM

## 2023-04-04 PROCEDURE — 99212 OFFICE O/P EST SF 10 MIN: CPT | Mod: PBBFAC,PO | Performed by: STUDENT IN AN ORGANIZED HEALTH CARE EDUCATION/TRAINING PROGRAM

## 2023-04-04 RX ORDER — NABUMETONE 750 MG/1
750 TABLET, FILM COATED ORAL 2 TIMES DAILY
COMMUNITY
Start: 2023-01-08 | End: 2023-06-13 | Stop reason: SDUPTHER

## 2023-04-04 NOTE — PROGRESS NOTES
HPI     RAISA Suspect     Additional comments: Pt last seen 1/26/2022. Was to have MRI to assess   muscle enlargement and any orbital disease. Also was to have labs MRI   results printed from [facility]. Don't see lab results. Pt is not sure if she   had labs. Does not wish to be dilated today but can come back and be   dilated at later date. If labs are reordered, pt would be willing to be   dilated on the day we review those.            Blurred Vision     Additional comments: Pt would like to update MRx today          Last edited by Nancy Rizvi RN on 4/4/2023  3:46 PM.            Assessment /Plan     For exam results, see Encounter Report.    Myopia with astigmatism, bilateral    Age-related nuclear cataract of both eyes    Thyroid eye disease      RAISA Suspect  - OCT RNFL all green ou  - sherice 95, 20//20  - color intact  - will order TSI, anti-thyroid, t4, t3, tsh, vitamin d, and selenium  - mri orbits wnl    2. NSC OU  - nvs, 20/20 BCVA    3. Visual Disturbances  - amaorosis standing up 2-3 times per month last few years  - hvf w large peripheral defects ou  - normal oct mac and oct rnfl. Mri brain w small cyst, but otherwise wnl  - monitor    4. Exophoria  - does not bother pt  - monitor    5. Physiologic Anisocoria  - montior    6. Sellar Cyst  - no compression  - monitor    RTC 8 weeks lab review, DFE

## 2023-04-12 PROBLEM — M48.02 CERVICAL STENOSIS OF SPINAL CANAL: Status: ACTIVE | Noted: 2023-04-12

## 2023-05-03 ENCOUNTER — HOSPITAL ENCOUNTER (EMERGENCY)
Facility: HOSPITAL | Age: 53
Discharge: HOME | End: 2023-05-03
Attending: FAMILY MEDICINE
Payer: MEDICAID

## 2023-05-03 VITALS
HEART RATE: 80 BPM | RESPIRATION RATE: 18 BRPM | TEMPERATURE: 99 F | DIASTOLIC BLOOD PRESSURE: 74 MMHG | WEIGHT: 147 LBS | OXYGEN SATURATION: 100 % | SYSTOLIC BLOOD PRESSURE: 140 MMHG | HEIGHT: 69 IN | BODY MASS INDEX: 21.77 KG/M2

## 2023-05-03 DIAGNOSIS — M54.2 NECK PAIN: Primary | ICD-10-CM

## 2023-05-03 PROCEDURE — 63600175 PHARM REV CODE 636 W HCPCS: Performed by: FAMILY MEDICINE

## 2023-05-03 PROCEDURE — 96372 THER/PROPH/DIAG INJ SC/IM: CPT | Performed by: FAMILY MEDICINE

## 2023-05-03 PROCEDURE — 99285 EMERGENCY DEPT VISIT HI MDM: CPT | Mod: 25

## 2023-05-03 RX ORDER — KETOROLAC TROMETHAMINE 30 MG/ML
60 INJECTION, SOLUTION INTRAMUSCULAR; INTRAVENOUS
Status: COMPLETED | OUTPATIENT
Start: 2023-05-03 | End: 2023-05-03

## 2023-05-03 RX ORDER — CYCLOBENZAPRINE HCL 10 MG
10 TABLET ORAL 3 TIMES DAILY PRN
Qty: 15 TABLET | Refills: 0 | Status: SHIPPED | OUTPATIENT
Start: 2023-05-03 | End: 2023-05-08

## 2023-05-03 RX ORDER — TRAMADOL HYDROCHLORIDE 50 MG/1
50 TABLET ORAL EVERY 6 HOURS PRN
Qty: 12 TABLET | Refills: 0 | Status: SHIPPED | OUTPATIENT
Start: 2023-05-03 | End: 2023-05-11 | Stop reason: SDUPTHER

## 2023-05-03 RX ADMIN — KETOROLAC TROMETHAMINE 60 MG: 30 INJECTION, SOLUTION INTRAMUSCULAR; INTRAVENOUS at 02:05

## 2023-05-03 NOTE — ED NOTES
Pt called to ask for antibiotics the md said he would order/ dr og ordered amoxcillin 500mg tid x7 days#21/no refill/ called [facility] pharmacy, [facility]

## 2023-05-03 NOTE — ED NOTES
Pt dc nad and without complaints follow up with pcp for eval, rx sent to pharm and tramadol printed

## 2023-05-03 NOTE — ED PROVIDER NOTES
Encounter Date: 5/3/2023       History     Chief Complaint   Patient presents with    Neck Pain     Pt presents with c/o neck pain; pt had surgery 2wks ago to replace cervical disc, pt states she is hurting more now that before the surgery; she contact surgeon (in [place]) and she said they sent in another rx for pain meds but she wants to know what is going on; pt also c/o right lower mouth/tooth pain; denies fevers; denies weakness/tingling/numbness      HPI  Review of patient's allergies indicates:   Allergen Reactions    Ibuprofen Nausea And Vomiting     Burns pt's stomach     Past Medical History:   Diagnosis Date    Cervical stenosis of spinal canal 2023    Pituitary mass 2022     Past Surgical History:   Procedure Laterality Date     SECTION  2006    COLECTOMY  2017    COLONOSCOPY  2022    COLONOSCOPY  2017    [facility], Rony Majano MD    NECK SURGERY  2019    TUBAL LIGATION  2006     Family History   Problem Relation Age of Onset    Ovarian cancer Mother 48    Arthritis Father     Colon cancer Father         age 40     Social History     Tobacco Use    Smoking status: Every Day     Packs/day: 1.00     Types: Cigarettes, Vaping with nicotine    Smokeless tobacco: Never    Tobacco comments:     2023- Pt states no longer smokes; now vapes with nicotine   Substance Use Topics    Alcohol use: Not Currently    Drug use: Yes     Frequency: 2.0 times per week     Types: Marijuana     Review of Systems    Physical Exam     Initial Vitals [23 1332]   BP Pulse Resp Temp SpO2   (!) 144/79 96 18 98.6 °F (37 °C) 100 %      MAP       --         Physical Exam    ED Course   Procedures  Labs Reviewed - No data to display       Imaging Results              CT Cervical Spine Without Contrast (Final result)  Result time 23 14:01:50      Final result by Britt Michael MD (23 14:01:50)                   Impression:      Postsurgical changes of ACDF without  appreciable complication by noncontrast CT evaluation.      Electronically signed by: Britt Michael  Date:    05/03/2023  Time:    14:01               Narrative:    EXAMINATION:  CT CERVICAL SPINE WITHOUT CONTRAST    CLINICAL HISTORY:  Neck pain, acute, prior cervical surgery;    TECHNIQUE:  Low dose helical acquired images with axial, sagittal and coronal reformations though the cervical spine.  Contrast was not administered.    All CT scans at this location are performed using dose optimization techniques as appropriate to a performed exam including the following automated exposure control, adjustment of the mA and/or kV according to patient size and/or use of iterative reconstruction technique    DLP: 145 mGycm    COMPARISON:  CT cervical spine 03/26/2023    FINDINGS:  BONES: There are postsurgical changes of C4-C5 ACDF and interval C5-C7 ACDF.  Hardware appears engaged and intact.  Alignment is normal.    No fracture.  There is uncovertebral hypertrophy at C5-C6 with moderate narrowing of the right neural foramen.    SOFT TISSUES: There are calcifications at the carotid bulbs, left greater than right.  Biapical pleuroparenchymal scarring.  No appreciable postoperative fluid collection.    LUNG APICES: Clear                                       Medications   ketorolac injection 60 mg (has no administration in time range)                              Clinical Impression:   Final diagnoses:  [M54.2] Neck pain (Primary)        ED Disposition Condition    Discharge Stable          ED Prescriptions       Medication Sig Dispense Start Date End Date Auth. Provider    cyclobenzaprine (FLEXERIL) 10 MG tablet Take 1 tablet (10 mg total) by mouth 3 (three) times daily as needed for Muscle spasms. 15 tablet 5/3/2023 5/8/2023 Ramos Velazco MD    traMADoL (ULTRAM) 50 mg tablet Take 1 tablet (50 mg total) by mouth every 6 (six) hours as needed for Pain. 12 tablet 5/3/2023 -- Ramos Velazco MD          Follow-up  Information       Follow up With Specialties Details Why Contact Info    Aisha Preston MD Family Medicine   [address] [facility] [address]  [phone]               Ramos Velazco MD  05/03/23 4730

## 2023-05-24 PROBLEM — Z98.1 S/P CERVICAL SPINAL FUSION: Status: ACTIVE | Noted: 2023-05-24

## 2023-06-13 ENCOUNTER — OFFICE VISIT (OUTPATIENT)
Dept: FAMILY MEDICINE | Facility: CLINIC | Age: 53
End: 2023-06-13
Payer: MEDICAID

## 2023-06-13 VITALS
HEART RATE: 63 BPM | RESPIRATION RATE: 18 BRPM | TEMPERATURE: 98 F | SYSTOLIC BLOOD PRESSURE: 125 MMHG | BODY MASS INDEX: 22.07 KG/M2 | OXYGEN SATURATION: 100 % | HEIGHT: 69 IN | WEIGHT: 149 LBS | DIASTOLIC BLOOD PRESSURE: 80 MMHG

## 2023-06-13 DIAGNOSIS — G89.29 CHRONIC NECK PAIN: Primary | ICD-10-CM

## 2023-06-13 DIAGNOSIS — M54.2 CHRONIC NECK PAIN: Primary | ICD-10-CM

## 2023-06-13 DIAGNOSIS — J30.2 SEASONAL ALLERGIC RHINITIS, UNSPECIFIED TRIGGER: ICD-10-CM

## 2023-06-13 DIAGNOSIS — G89.29 CHRONIC LEFT SHOULDER PAIN: ICD-10-CM

## 2023-06-13 DIAGNOSIS — M25.512 CHRONIC LEFT SHOULDER PAIN: ICD-10-CM

## 2023-06-13 DIAGNOSIS — M54.12 CERVICAL RADICULOPATHY: ICD-10-CM

## 2023-06-13 PROCEDURE — 3074F SYST BP LT 130 MM HG: CPT | Mod: CPTII,,, | Performed by: FAMILY MEDICINE

## 2023-06-13 PROCEDURE — 99214 OFFICE O/P EST MOD 30 MIN: CPT | Mod: PBBFAC | Performed by: FAMILY MEDICINE

## 2023-06-13 PROCEDURE — 3079F PR MOST RECENT DIASTOLIC BLOOD PRESSURE 80-89 MM HG: ICD-10-PCS | Mod: CPTII,,, | Performed by: FAMILY MEDICINE

## 2023-06-13 PROCEDURE — 1159F PR MEDICATION LIST DOCUMENTED IN MEDICAL RECORD: ICD-10-PCS | Mod: CPTII,,, | Performed by: FAMILY MEDICINE

## 2023-06-13 PROCEDURE — 99215 OFFICE O/P EST HI 40 MIN: CPT | Mod: S$PBB,,, | Performed by: FAMILY MEDICINE

## 2023-06-13 PROCEDURE — 3079F DIAST BP 80-89 MM HG: CPT | Mod: CPTII,,, | Performed by: FAMILY MEDICINE

## 2023-06-13 PROCEDURE — 1159F MED LIST DOCD IN RCRD: CPT | Mod: CPTII,,, | Performed by: FAMILY MEDICINE

## 2023-06-13 PROCEDURE — 3008F PR BODY MASS INDEX (BMI) DOCUMENTED: ICD-10-PCS | Mod: CPTII,,, | Performed by: FAMILY MEDICINE

## 2023-06-13 PROCEDURE — 99215 PR OFFICE/OUTPT VISIT, EST, LEVL V, 40-54 MIN: ICD-10-PCS | Mod: S$PBB,,, | Performed by: FAMILY MEDICINE

## 2023-06-13 PROCEDURE — 3008F BODY MASS INDEX DOCD: CPT | Mod: CPTII,,, | Performed by: FAMILY MEDICINE

## 2023-06-13 PROCEDURE — 3074F PR MOST RECENT SYSTOLIC BLOOD PRESSURE < 130 MM HG: ICD-10-PCS | Mod: CPTII,,, | Performed by: FAMILY MEDICINE

## 2023-06-13 RX ORDER — LEVOCETIRIZINE DIHYDROCHLORIDE 5 MG/1
5 TABLET, FILM COATED ORAL NIGHTLY
Qty: 30 TABLET | Refills: 1 | Status: SHIPPED | OUTPATIENT
Start: 2023-06-13 | End: 2024-04-01 | Stop reason: SDUPTHER

## 2023-06-13 RX ORDER — NABUMETONE 750 MG/1
750 TABLET, FILM COATED ORAL 2 TIMES DAILY
Qty: 60 TABLET | Refills: 0 | Status: SHIPPED | OUTPATIENT
Start: 2023-06-13 | End: 2023-07-07

## 2023-06-13 RX ORDER — AZELASTINE 1 MG/ML
1 SPRAY, METERED NASAL 2 TIMES DAILY
Qty: 30 ML | Refills: 0 | Status: SHIPPED | OUTPATIENT
Start: 2023-06-13 | End: 2023-06-15

## 2023-06-13 RX ORDER — GABAPENTIN 300 MG/1
300 CAPSULE ORAL 3 TIMES DAILY
Qty: 90 CAPSULE | Refills: 0 | Status: SHIPPED | OUTPATIENT
Start: 2023-06-13 | End: 2024-04-01

## 2023-06-13 RX ORDER — CYCLOBENZAPRINE HCL 10 MG
10 TABLET ORAL 3 TIMES DAILY PRN
Qty: 90 TABLET | Refills: 0 | OUTPATIENT
Start: 2023-06-13 | End: 2023-06-14

## 2023-06-13 NOTE — PROGRESS NOTES
"Patient Name: Galina Walker   : 1970  MRN: [id number]     SUBJECTIVE:  Galina Walker is a 52 y.o. female here for Shoulder Pain (Patient c/o of right and left shoulder pain before and after neck surgery. I had a phone call with the patient about PCP prescribing prescription medication on last week. )  .    HPI  Patient here for neck/shoulder pain  Pt seeing neurosurgery and she was advised last week that the refill of lyrica should be from her neurosurgeon that it was prescribed from and being managed for the cervical stenosis/radiculopathy.  Patient saw neurosurgery post/op (-s/p C5-C7 ACDF on 2023), prescribed lyrica 50 mg BID from them, lidocaine patch, acetaminophen with codeine and zanaflex. Has been following with them regarding the radiating pain to bilateral shoulders pre and post op. They ordered an MRI of cervical spine just few weeks ago. Pt was also given medrol pack that she just finished.  Pt has seen orthopedics few months ago. 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 Intra-articular injections today due to return of symptoms since last injection which are impacting ADLs.   If no improvement after RX PT will consider MRI. "  Patient states that since the surgery, the only thing that improved was shocking pain from the neck down to the right arm, felt just slightly better, but seems like the symptoms now have resumed the same as before the surgery.  She has appointment with Neurosurgery next month.  Has not reached out to them regarding the management not controlling her symptoms.  MRI of the cervical spine pending.    Seasonal allergies. Has tried claritin and flonase. Also has tried zyrtec in the past. Watery eyes, sneezing. Clear rhinorrhea. Postnasal drip.    ALLERGIES:   Review of patient's allergies indicates:   Allergen Reactions    " "Ibuprofen Nausea And Vomiting     Burns pt's stomach         ROS:  Review of Systems   Constitutional:  Negative for chills, fever and weight loss.   HENT:  Positive for congestion (nasal).    Eyes:  Negative for blurred vision.   Respiratory:  Negative for cough and shortness of breath.    Cardiovascular:  Negative for chest pain, palpitations and leg swelling.   Gastrointestinal:  Negative for nausea and vomiting.   Genitourinary:  Negative for dysuria and hematuria.   Musculoskeletal:  Positive for joint pain and neck pain.   Neurological:  Negative for dizziness and headaches.   Psychiatric/Behavioral:  Negative for depression. The patient is nervous/anxious.        OBJECTIVE:  Vital signs  Vitals:    06/13/23 0832   BP: 125/80   Pulse: 63   Resp: 18   Temp: 97.9 °F (36.6 °C)   TempSrc: Oral   SpO2: 100%   Weight: 67.6 kg (149 lb)   Height: 5' 9" (1.753 m)      Body mass index is 22 kg/m².    PHYSICAL EXAM:   Physical Exam  Vitals reviewed.   Constitutional:       General: She is not in acute distress (but frustrated with the pain and the recent neck surgery not making a difference in her pain).     Appearance: Normal appearance. She is not ill-appearing.   HENT:      Head: Normocephalic and atraumatic.      Right Ear: External ear normal.      Left Ear: External ear normal.      Nose: Congestion and rhinorrhea present.      Mouth/Throat:      Mouth: Mucous membranes are moist.   Eyes:      General: No scleral icterus.        Right eye: No discharge.         Left eye: No discharge.      Conjunctiva/sclera: Conjunctivae normal.      Pupils: Pupils are equal, round, and reactive to light.   Cardiovascular:      Rate and Rhythm: Normal rate and regular rhythm.      Heart sounds: No murmur heard.  Pulmonary:      Effort: Pulmonary effort is normal. No respiratory distress.      Breath sounds: No wheezing, rhonchi or rales.   Abdominal:      General: Bowel sounds are normal. There is no distension.      Palpations: " Abdomen is soft.      Tenderness: There is no abdominal tenderness.   Musculoskeletal:      Cervical back: Tenderness (even with the slightlies palpation. hypersensitivity in her skin and soreness of the prevertebral cervical spine and trapezius muscles, anterior shoulder. limited ROM of shoulders because of shooting pain in her neck) present. No rigidity.      Right lower leg: No edema.      Left lower leg: No edema.   Skin:     General: Skin is warm.      Findings: No rash.   Neurological:      General: No focal deficit present.      Mental Status: She is alert and oriented to person, place, and time.   Psychiatric:         Mood and Affect: Mood is anxious.         Behavior: Behavior is agitated.        ASSESSMENT/PLAN:  1. Chronic neck pain  -     cyclobenzaprine (FLEXERIL) 10 MG tablet; Take 1 tablet (10 mg total) by mouth 3 (three) times daily as needed for Muscle spasms.  Dispense: 90 tablet; Refill: 0  -     gabapentin (NEURONTIN) 300 MG capsule; Take 1 capsule (300 mg total) by mouth 3 (three) times daily.  Dispense: 90 capsule; Refill: 0  -     nabumetone (RELAFEN) 750 MG tablet; Take 1 tablet (750 mg total) by mouth 2 (two) times daily.  Dispense: 60 tablet; Refill: 0    2. Cervical radiculopathy  -     cyclobenzaprine (FLEXERIL) 10 MG tablet; Take 1 tablet (10 mg total) by mouth 3 (three) times daily as needed for Muscle spasms.  Dispense: 90 tablet; Refill: 0  -     gabapentin (NEURONTIN) 300 MG capsule; Take 1 capsule (300 mg total) by mouth 3 (three) times daily.  Dispense: 90 capsule; Refill: 0  -     nabumetone (RELAFEN) 750 MG tablet; Take 1 tablet (750 mg total) by mouth 2 (two) times daily.  Dispense: 60 tablet; Refill: 0    3. Chronic left shoulder pain    4. Seasonal allergic rhinitis, unspecified trigger  -     levocetirizine (XYZAL) 5 MG tablet; Take 1 tablet (5 mg total) by mouth every evening.  Dispense: 30 tablet; Refill: 1  -     azelastine (ASTELIN) 137 mcg (0.1 %) nasal spray; 1 spray  (137 mcg total) by Nasal route 2 (two) times daily.  Dispense: 30 mL; Refill: 0     Plan  -had a discussion at length with the patient regarding the chronic neck pain and actively being managed by Neurosurgery.  Advised patient to reach out to her neurosurgeon because of the persistent issues she is having despite neck surgery and their recommended medications.  Patient confused if she had Lyrica at all.  But then requesting refill.  In the meantime, trial of gabapentin 300 mg 3 times a day, stop Lyrica.  Has tolerated nabumetone well in the past, we will refill that and add Flexeril 10 mg 3 times a day as needed.  Discussed with patient that would be more appropriate if she followed recommendations from Neurosurgery, having MRI of the cervical spine, follow up with them next month then if no improvement to maybe reschedule appointment with Orthopedics regarding the shoulder pain, because they did have suspicion to be radiculitis from the neck.  -Xyzal and Astelin for seasonal allergies, can continue with flonase as well.    I spent a total of 45 minutes on the day of the visit.This includes face to face time and non-face to face time preparing to see the patient (eg, review of tests), obtaining and/or reviewing separately obtained history, documenting clinical information in the electronic or other health record, independently interpreting results and communicating results to the patient/family/caregiver, or care coordinator.        Previous medical history/lab work/radiology reviewed and considered during medical management decisions.   Medication list reviewed and medication reconciliation performed.  Patient was provided  and care about his/her current diagnosis (es) and medications including risk/benefit and side effects/adverse events, over the counter medication uses/doses, home self-care and contact precautions,  and red flags and indications for when to seek immediate medical attention.   Patient was  advised to continue compliance with current medication list and medical recommendations.  Recommended/ Advised continued compliance with recommended eating habits/ diets for medical conditions and exercise 150 minutes/ week (if possible) for medical condition (s).        RESULTS:  No results found for this or any previous visit (from the past 1008 hour(s)).      Follow Up:  Follow up in about 6 months (around 12/13/2023).     [unfilled]    This note was created with the assistance of a voice recognition software or phone dictation. There may be transcription errors as a result of using this technology however minimal. Effort has been made to assure accuracy of transcription but any obvious errors or omissions should be clarified with the author of the document

## 2023-06-14 ENCOUNTER — HOSPITAL ENCOUNTER (EMERGENCY)
Facility: HOSPITAL | Age: 53
Discharge: HOME | End: 2023-06-14
Attending: FAMILY MEDICINE
Payer: MEDICAID

## 2023-06-14 VITALS
SYSTOLIC BLOOD PRESSURE: 120 MMHG | HEIGHT: 69 IN | RESPIRATION RATE: 21 BRPM | DIASTOLIC BLOOD PRESSURE: 86 MMHG | HEART RATE: 84 BPM | OXYGEN SATURATION: 100 % | BODY MASS INDEX: 22.07 KG/M2 | WEIGHT: 149 LBS | TEMPERATURE: 98 F

## 2023-06-14 DIAGNOSIS — R07.9 CHEST PAIN: ICD-10-CM

## 2023-06-14 DIAGNOSIS — M54.2 NECK PAIN: Primary | ICD-10-CM

## 2023-06-14 DIAGNOSIS — R20.0 NUMBNESS: ICD-10-CM

## 2023-06-14 LAB
ALBUMIN SERPL-MCNC: 4.1 G/DL (ref 3.5–5)
ALBUMIN/GLOB SERPL: 1.2 RATIO (ref 1.1–2)
ALP SERPL-CCNC: 64 UNIT/L (ref 40–150)
ALT SERPL-CCNC: 15 UNIT/L (ref 0–55)
APPEARANCE UR: CLEAR
AST SERPL-CCNC: 17 UNIT/L (ref 5–34)
BACTERIA #/AREA URNS AUTO: ABNORMAL /HPF
BASOPHILS # BLD AUTO: 0.02 X10(3)/MCL
BASOPHILS NFR BLD AUTO: 0.2 %
BILIRUB UR QL STRIP.AUTO: NEGATIVE MG/DL
BILIRUBIN DIRECT+TOT PNL SERPL-MCNC: 0.3 MG/DL
BUN SERPL-MCNC: 18.2 MG/DL (ref 9.8–20.1)
CALCIUM SERPL-MCNC: 9.6 MG/DL (ref 8.4–10.2)
CHLORIDE SERPL-SCNC: 105 MMOL/L (ref 98–107)
CO2 SERPL-SCNC: 28 MMOL/L (ref 22–29)
COLOR UR: YELLOW
CREAT SERPL-MCNC: 0.77 MG/DL (ref 0.55–1.02)
EOSINOPHIL # BLD AUTO: 0.1 X10(3)/MCL (ref 0–0.9)
EOSINOPHIL NFR BLD AUTO: 1.2 %
ERYTHROCYTE [DISTWIDTH] IN BLOOD BY AUTOMATED COUNT: 13.2 % (ref 11.5–17)
GFR SERPLBLD CREATININE-BSD FMLA CKD-EPI: >60 MLS/MIN/1.73/M2
GLOBULIN SER-MCNC: 3.3 GM/DL (ref 2.4–3.5)
GLUCOSE SERPL-MCNC: 85 MG/DL (ref 74–100)
GLUCOSE UR QL STRIP.AUTO: NEGATIVE MG/DL
HCT VFR BLD AUTO: 40.2 % (ref 37–47)
HGB BLD-MCNC: 12.5 G/DL (ref 12–16)
IMM GRANULOCYTES # BLD AUTO: 0.04 X10(3)/MCL (ref 0–0.04)
IMM GRANULOCYTES NFR BLD AUTO: 0.5 %
KETONES UR QL STRIP.AUTO: NEGATIVE MG/DL
LEUKOCYTE ESTERASE UR QL STRIP.AUTO: NEGATIVE UNIT/L
LYMPHOCYTES # BLD AUTO: 2.95 X10(3)/MCL (ref 0.6–4.6)
LYMPHOCYTES NFR BLD AUTO: 35.8 %
MCH RBC QN AUTO: 30.8 PG (ref 27–31)
MCHC RBC AUTO-ENTMCNC: 31.1 G/DL (ref 33–36)
MCV RBC AUTO: 99 FL (ref 80–94)
MONOCYTES # BLD AUTO: 0.39 X10(3)/MCL (ref 0.1–1.3)
MONOCYTES NFR BLD AUTO: 4.7 %
NEUTROPHILS # BLD AUTO: 4.75 X10(3)/MCL (ref 2.1–9.2)
NEUTROPHILS NFR BLD AUTO: 57.6 %
NITRITE UR QL STRIP.AUTO: NEGATIVE
PH UR STRIP.AUTO: 5.5 [PH]
PLATELET # BLD AUTO: 312 X10(3)/MCL (ref 130–400)
PMV BLD AUTO: 9.5 FL (ref 7.4–10.4)
POC CARDIAC TROPONIN I: 0 NG/ML (ref 0–0.08)
POTASSIUM SERPL-SCNC: 3.4 MMOL/L (ref 3.5–5.1)
PROT SERPL-MCNC: 7.4 GM/DL (ref 6.4–8.3)
PROT UR QL STRIP.AUTO: NEGATIVE MG/DL
RBC # BLD AUTO: 4.06 X10(6)/MCL (ref 4.2–5.4)
RBC #/AREA URNS AUTO: ABNORMAL /HPF
RBC UR QL AUTO: NEGATIVE UNIT/L
SAMPLE: NORMAL
SODIUM SERPL-SCNC: 143 MMOL/L (ref 136–145)
SP GR UR STRIP.AUTO: >=1.03
SQUAMOUS #/AREA URNS AUTO: ABNORMAL /HPF
UROBILINOGEN UR STRIP-ACNC: 0.2 MG/DL
WBC # SPEC AUTO: 8.25 X10(3)/MCL (ref 4.5–11.5)
WBC #/AREA URNS AUTO: ABNORMAL /HPF

## 2023-06-14 PROCEDURE — 85025 COMPLETE CBC W/AUTO DIFF WBC: CPT | Performed by: FAMILY MEDICINE

## 2023-06-14 PROCEDURE — 80053 COMPREHEN METABOLIC PANEL: CPT | Performed by: FAMILY MEDICINE

## 2023-06-14 PROCEDURE — 99285 EMERGENCY DEPT VISIT HI MDM: CPT | Mod: 25

## 2023-06-14 PROCEDURE — 93010 ELECTROCARDIOGRAM REPORT: CPT | Mod: ,,, | Performed by: INTERNAL MEDICINE

## 2023-06-14 PROCEDURE — 93010 EKG 12-LEAD: ICD-10-PCS | Mod: ,,, | Performed by: INTERNAL MEDICINE

## 2023-06-14 PROCEDURE — 81001 URINALYSIS AUTO W/SCOPE: CPT | Performed by: FAMILY MEDICINE

## 2023-06-14 PROCEDURE — 93005 ELECTROCARDIOGRAM TRACING: CPT

## 2023-06-14 PROCEDURE — 84484 ASSAY OF TROPONIN QUANT: CPT

## 2023-06-14 RX ORDER — CYCLOBENZAPRINE HCL 10 MG
10 TABLET ORAL 3 TIMES DAILY PRN
Qty: 15 TABLET | Refills: 0 | Status: SHIPPED | OUTPATIENT
Start: 2023-06-14 | End: 2023-06-19

## 2023-06-14 RX ORDER — HYDROCODONE BITARTRATE AND ACETAMINOPHEN 5; 325 MG/1; MG/1
1 TABLET ORAL EVERY 6 HOURS PRN
Qty: 12 TABLET | Refills: 0 | OUTPATIENT
Start: 2023-06-14 | End: 2023-07-17

## 2023-06-14 NOTE — Clinical Note
"Galina Rochapernell Walker was seen and treated in our emergency department on 6/14/2023.  She may return to work on 06/17/2023.       If you have any questions or concerns, please don't hesitate to call.      Nichol Smith RN    "

## 2023-06-15 RX ORDER — AZELASTINE 1 MG/ML
SPRAY, METERED NASAL
Qty: 90 ML | Refills: 0 | Status: SHIPPED | OUTPATIENT
Start: 2023-06-15

## 2023-06-15 NOTE — ED PROVIDER NOTES
Encounter Date: 2023       History     Chief Complaint   Patient presents with    Neck Pain     Pt c/o neck pain and numbness to her left arm; reports she had neck surgery in April. Pt reports SOB with exertion; denies any CP.      HPI  Review of patient's allergies indicates:   Allergen Reactions    Ibuprofen Nausea And Vomiting     Burns pt's stomach     Past Medical History:   Diagnosis Date    C5-C7 ACDF 2023    Cervical stenosis of spinal canal 2023    Pituitary mass 2022     Past Surgical History:   Procedure Laterality Date     SECTION  2006    COLECTOMY  2017    COLONOSCOPY  2022    [facility]  2017    [facility], Rony Majano MD    NECK SURGERY  2019    SPINE SURGERY  2023    C5-C7 ACDF    TUBAL LIGATION  2006     Family History   Problem Relation Age of Onset    Ovarian cancer Mother 48    Arthritis Father     Colon cancer Father         age 40     Social History     Tobacco Use    Smoking status: Every Day     Packs/day: 1.00     Types: Cigarettes, Vaping with nicotine    Smokeless tobacco: Never    Tobacco comments:     2023- Pt states no longer smokes; now vapes with nicotine   Substance Use Topics    Alcohol use: Not Currently    Drug use: Yes     Frequency: 2.0 times per week     Types: Marijuana     Review of Systems   Constitutional:  Negative for fever.   HENT:  Negative for sore throat.    Respiratory:  Negative for shortness of breath.    Cardiovascular:  Negative for chest pain.   Gastrointestinal:  Negative for nausea.   Genitourinary:  Negative for dysuria.   Musculoskeletal:  Positive for neck pain. Negative for back pain.   Skin:  Negative for rash.   Neurological:  Negative for weakness.   Hematological:  Does not bruise/bleed easily.   All other systems reviewed and are negative.    Physical Exam     Initial Vitals [23]   BP Pulse Resp Temp SpO2   135/85 105 18 98.1 °F (36.7 °C) 100 %      MAP        --         Physical Exam    Nursing note and vitals reviewed.  Constitutional: She appears well-developed.   HENT:   Head: Normocephalic and atraumatic.   Right Ear: External ear normal.   Left Ear: External ear normal.   Nose: Nose normal.   Mouth/Throat: Oropharynx is clear and moist. No oropharyngeal exudate.   Eyes: Conjunctivae and EOM are normal. Pupils are equal, round, and reactive to light. Right eye exhibits no discharge. Left eye exhibits no discharge.   Neck: Neck supple. No tracheal deviation present. No JVD present.   Normal range of motion.  Cardiovascular:  Normal rate, regular rhythm, normal heart sounds and intact distal pulses.     Exam reveals no gallop and no friction rub.       No murmur heard.  Pulmonary/Chest: Breath sounds normal. No stridor. No respiratory distress. She has no wheezes. She has no rhonchi. She has no rales.   Abdominal: Abdomen is soft. Bowel sounds are normal. She exhibits no distension and no mass. There is no abdominal tenderness. There is no rebound and no guarding.   Musculoskeletal:         General: Tenderness present. Normal range of motion.      Cervical back: Normal range of motion and neck supple.     Neurological: She is alert and oriented to person, place, and time. She has normal strength. No cranial nerve deficit.   Skin: Skin is warm and dry. No rash and no abscess noted. No erythema.   Psychiatric: She has a normal mood and affect. Her behavior is normal. Judgment and thought content normal.       ED Course   Procedures  Labs Reviewed   COMPREHENSIVE METABOLIC PANEL - Abnormal; Notable for the following components:       Result Value    Potassium Level 3.4 (*)     All other components within normal limits   CBC WITH DIFFERENTIAL - Abnormal; Notable for the following components:    RBC 4.06 (*)     MCV 99.0 (*)     MCHC 31.1 (*)     All other components within normal limits   URINALYSIS, MICROSCOPIC - Abnormal; Notable for the following components:     Squamous Epithelial Cells, UA Few (*)     All other components within normal limits   CBC W/ AUTO DIFFERENTIAL    Narrative:     The following orders were created for panel order CBC auto differential.  Procedure                               Abnormality         Status                     ---------                               -----------         ------                     CBC with Differential[[id number]]        Abnormal            Final result                 Please view results for these tests on the individual orders.   URINALYSIS, REFLEX TO URINE CULTURE   TROPONIN ISTAT   POCT TROPONIN     EKG Readings: (Independently Interpreted)   Initial Reading: No STEMI. Rhythm: Normal Sinus Rhythm. Ectopy: No Ectopy. Conduction: Normal. ST Segments: Normal ST Segments. T Waves: Normal. Axis: Normal. Clinical Impression: Normal Sinus Rhythm     Imaging Results              CT Cervical Spine Without Contrast (Final result)  Result time 06/14/23 20:14:02      Final result by Ronald Nevarez MD (06/14/23 20:14:02)                   Impression:      Cervical spine operative and degenerative changes without significant change compared to last month exam performed on May 3, 2023.      Electronically signed by: Ronald Nevarez  Date:    06/14/2023  Time:    20:14               Narrative:    EXAMINATION:  CT CERVICAL SPINE WITHOUT CONTRAST    CLINICAL HISTORY:  Neck pain, chronic;    TECHNIQUE:  Sequential axial images were obtained of the cervical spine without contrast and the images were reformatted.    Dose length product was 124 mGycm. Approximated exposure control was utilized to minimize radiation dose.    COMPARISON:  May 3, 2023    FINDINGS:  There is ACDF from C4 through C7.  There is interbody fusion at C4-C5.  Disc prosthesis at C5-C6 and C6-C7.  There is similar loss of stature of C6 vertebral body.  Cervical vertebrae alignment is preserved.  No prevertebral soft tissue prominence.  Disc segmental analysis is given  below:    At C2-C3, disc is unremarkable.  Central canal is not stenosed and there are no narrowings of the neural foramen    At C3-C4, disc is unremarkable.  Central canal is not stenosed and there are no narrowings are neural foramen    At C4-C5, central canal is not stenosed.  There are no narrowings of the neural foramen    At C5-C6, central canal is not stenosed.  Moderate narrowing of the right neural foramen is caused by uncovertebral and facet arthropathy.  The left neural foramen is patent    At C6-C7, there is posterior vertebral spondylosis slightly indents the ventral thecal sac.  Right neural foramen is patent.  There is mild spondylotic narrowing of the left neural foramen.    At C7-T1, disc height is preserved.  Central canal is not stenosed and there are no narrowings are neural foramen                                       Medications - No data to display  Medical Decision Making:   Differential Diagnosis:   Neck pain neck injury sprain strain fracture                        Clinical Impression:   Final diagnoses:  [R07.9] Chest pain  [R20.0] Numbness  [M54.2] Neck pain (Primary)        ED Disposition Condition    Discharge Stable          ED Prescriptions       Medication Sig Dispense Start Date End Date Auth. Provider    cyclobenzaprine (FLEXERIL) 10 MG tablet Take 1 tablet (10 mg total) by mouth 3 (three) times daily as needed for Muscle spasms. 15 tablet 6/14/2023 6/19/2023 Ramos Velazco MD    HYDROcodone-acetaminophen (NORCO) 5-325 mg per tablet Take 1 tablet by mouth every 6 (six) hours as needed for Pain. 12 tablet 6/14/2023 -- Ramos Velazco MD          Follow-up Information       Follow up With Specialties Details Why Contact Info    Aisha Preston MD Family Medicine   [address] [facility] [id number] [phone]               Ramos Velazco MD  06/16/23 3339

## 2023-07-07 DIAGNOSIS — M54.2 CHRONIC NECK PAIN: ICD-10-CM

## 2023-07-07 DIAGNOSIS — M54.12 CERVICAL RADICULOPATHY: ICD-10-CM

## 2023-07-07 DIAGNOSIS — G89.29 CHRONIC NECK PAIN: ICD-10-CM

## 2023-07-07 RX ORDER — NABUMETONE 750 MG/1
TABLET, FILM COATED ORAL
Qty: 60 TABLET | Refills: 0 | Status: SHIPPED | OUTPATIENT
Start: 2023-07-07 | End: 2023-08-07

## 2023-07-12 DIAGNOSIS — M48.02 CERVICAL STENOSIS OF SPINAL CANAL: Primary | ICD-10-CM

## 2023-07-12 DIAGNOSIS — E23.6 PITUITARY MASS: ICD-10-CM

## 2023-07-12 DIAGNOSIS — M54.12 CERVICAL RADICULOPATHY: ICD-10-CM

## 2023-07-14 DIAGNOSIS — M54.12 CERVICAL RADICULOPATHY: ICD-10-CM

## 2023-07-14 DIAGNOSIS — G89.29 CHRONIC NECK PAIN: Primary | ICD-10-CM

## 2023-07-14 DIAGNOSIS — M48.02 CERVICAL STENOSIS OF SPINAL CANAL: ICD-10-CM

## 2023-07-14 DIAGNOSIS — M54.2 CHRONIC NECK PAIN: Primary | ICD-10-CM

## 2023-07-17 ENCOUNTER — HOSPITAL ENCOUNTER (EMERGENCY)
Facility: HOSPITAL | Age: 53
Discharge: HOME | End: 2023-07-17
Attending: EMERGENCY MEDICINE
Payer: MEDICAID

## 2023-07-17 VITALS
BODY MASS INDEX: 21.77 KG/M2 | WEIGHT: 147 LBS | OXYGEN SATURATION: 98 % | TEMPERATURE: 98 F | DIASTOLIC BLOOD PRESSURE: 92 MMHG | HEART RATE: 90 BPM | HEIGHT: 69 IN | SYSTOLIC BLOOD PRESSURE: 147 MMHG | RESPIRATION RATE: 18 BRPM

## 2023-07-17 DIAGNOSIS — G89.29 CHRONIC SHOULDER PAIN, UNSPECIFIED LATERALITY: Primary | ICD-10-CM

## 2023-07-17 DIAGNOSIS — M25.519 CHRONIC SHOULDER PAIN, UNSPECIFIED LATERALITY: Primary | ICD-10-CM

## 2023-07-17 PROCEDURE — 99283 EMERGENCY DEPT VISIT LOW MDM: CPT

## 2023-07-17 RX ORDER — HYDROCODONE BITARTRATE AND ACETAMINOPHEN 5; 325 MG/1; MG/1
1 TABLET ORAL EVERY 6 HOURS PRN
Qty: 12 TABLET | Refills: 0 | Status: SHIPPED | OUTPATIENT
Start: 2023-07-17

## 2023-07-17 NOTE — ED PROVIDER NOTES
Encounter Date: 2023       History     Chief Complaint   Patient presents with    Shoulder Pain     Jack shoulder pain x 2 weeks -pt reports she had cervical surgery in April and also states feels something sticking in her neck and thinks it may be the screws from her surgery      This 52-year-old female presents with bilateral shoulder pain for the past 2 weeks.  She has a history of cervical problems and had been followed by neurosurgery in [place] for several years.  She had a 2nd cervical surgery done in April.  She states she feels as if something is sticking in her throat.  She decided not to continue with the neurosurgeons in [place] and found a new primary physician in [place] who has referred her to pain management in [place].  She has not received that appointment yet.  She also has had chronic shoulder pain.     Review of patient's allergies indicates:   Allergen Reactions    Ibuprofen Nausea And Vomiting     Burns pt's stomach     Past Medical History:   Diagnosis Date    C5-C7 ACDF 2023    Cervical stenosis of spinal canal 2023    Pituitary mass 2022     Past Surgical History:   Procedure Laterality Date     SECTION  2006    COLECTOMY  2017    COLONOSCOPY  2022    COLONOSCOPY  2017    [facility], Royn Majano MD    NECK SURGERY  2019    SPINE SURGERY  2023    C5-C7 ACDF    TUBAL LIGATION  2006     Family History   Problem Relation Age of Onset    Ovarian cancer Mother 48    Arthritis Father     Colon cancer Father         age 40     Social History     Tobacco Use    Smoking status: Every Day     Packs/day: 1.00     Types: Cigarettes, Vaping with nicotine    Smokeless tobacco: Never    Tobacco comments:     2023- Pt states no longer smokes; now vapes with nicotine   Substance Use Topics    Alcohol use: Not Currently    Drug use: Yes     Frequency: 2.0 times per week     Types: Marijuana     Review of Systems   Constitutional:   Negative for fever.   HENT:  Negative for sore throat.    Respiratory:  Negative for shortness of breath.    Cardiovascular:  Negative for chest pain.   Gastrointestinal:  Negative for nausea.   Genitourinary:  Negative for dysuria.   Musculoskeletal:  Positive for arthralgias and neck pain. Negative for back pain.   Skin:  Negative for rash.   Neurological:  Positive for numbness (in fingers of right hand). Negative for weakness.   Hematological:  Does not bruise/bleed easily.     Physical Exam     Initial Vitals [07/17/23 0834]   BP Pulse Resp Temp SpO2   (!) 147/92 90 18 97.8 °F (36.6 °C) 98 %      MAP       --         Physical Exam    Nursing note and vitals reviewed.  Constitutional: She appears well-developed and well-nourished.   HENT:   Head: Normocephalic and atraumatic.   Eyes: Conjunctivae and EOM are normal. Pupils are equal, round, and reactive to light.   Neck: Neck supple.   Normal range of motion.  Cardiovascular:  Normal rate, regular rhythm, normal heart sounds and intact distal pulses.           Pulmonary/Chest: Breath sounds normal.   Abdominal: Abdomen is soft. Bowel sounds are normal.   Musculoskeletal:         General: Normal range of motion.      Cervical back: Normal range of motion and neck supple.     Neurological: She is alert and oriented to person, place, and time. She has normal strength.   Skin: Skin is warm and dry. Capillary refill takes less than 2 seconds.   Psychiatric: She has a normal mood and affect. Her behavior is normal. Judgment and thought content normal.       ED Course   Procedures  Labs Reviewed - No data to display       Imaging Results    None          Medications - No data to display                           Clinical Impression:   Final diagnoses:  [M25.519, G89.29] Chronic shoulder pain, unspecified laterality (Primary)        ED Disposition Condition    Discharge Stable          ED Prescriptions       Medication Sig Dispense Start Date End Date Auth. Provider     HYDROcodone-acetaminophen (NORCO) 5-325 mg per tablet Take 1 tablet by mouth every 6 (six) hours as needed for Pain. 12 tablet 7/17/2023 -- Dany Garsia MD          Follow-up Information       Follow up With Specialties Details Why Contact Info    Aisha Preston MD Family Medicine Schedule an appointment as soon as possible for a visit   [address] [facility] [id number] [phone]               Dany Garsia MD  07/17/23 0941

## 2023-07-28 ENCOUNTER — TELEPHONE (OUTPATIENT)
Dept: FAMILY MEDICINE | Facility: CLINIC | Age: 53
End: 2023-07-28
Payer: MEDICAID

## 2023-07-28 NOTE — TELEPHONE ENCOUNTER
I attempt to reach pt to schedule apt with her PCP ,but unfortunately I was  unable to reach patient   so i left both a voice mail and call back number.

## 2023-08-06 DIAGNOSIS — G89.29 CHRONIC NECK PAIN: ICD-10-CM

## 2023-08-06 DIAGNOSIS — M54.12 CERVICAL RADICULOPATHY: ICD-10-CM

## 2023-08-06 DIAGNOSIS — M54.2 CHRONIC NECK PAIN: ICD-10-CM

## 2023-08-07 RX ORDER — NABUMETONE 750 MG/1
TABLET, FILM COATED ORAL
Qty: 60 TABLET | Refills: 0 | Status: SHIPPED | OUTPATIENT
Start: 2023-08-07 | End: 2023-09-05

## 2023-08-16 DIAGNOSIS — F41.9 ANXIETY: ICD-10-CM

## 2023-08-22 RX ORDER — AMITRIPTYLINE HYDROCHLORIDE 25 MG/1
TABLET, FILM COATED ORAL
Qty: 30 TABLET | Refills: 2 | Status: SHIPPED | OUTPATIENT
Start: 2023-08-22 | End: 2024-04-01

## 2023-08-28 ENCOUNTER — HOSPITAL ENCOUNTER (OUTPATIENT)
Dept: RADIOLOGY | Facility: HOSPITAL | Age: 53
Discharge: HOME | End: 2023-08-28
Attending: NURSE PRACTITIONER
Payer: MEDICAID

## 2023-08-28 DIAGNOSIS — Z98.1 S/P CERVICAL SPINAL FUSION: ICD-10-CM

## 2023-08-29 ENCOUNTER — HOSPITAL ENCOUNTER (OUTPATIENT)
Dept: RADIOLOGY | Facility: HOSPITAL | Age: 53
Discharge: HOME | End: 2023-08-29
Attending: NURSE PRACTITIONER
Payer: MEDICAID

## 2023-08-29 DIAGNOSIS — Z98.1 S/P CERVICAL SPINAL FUSION: ICD-10-CM

## 2023-08-29 PROCEDURE — 72141 MRI NECK SPINE W/O DYE: CPT | Mod: TC

## 2023-08-29 NOTE — PROGRESS NOTES
Ms Walker came yesterday for her MRI of the cervical spine and was stuck several times for an iv which was unsuccessful. I tried to get in touch with the neurosurgery center in [place] to let them know but never received a call back. I told the patient to go home and hydrate really well and we could attempt to start another iv this morning. She is here today and I was unsuccessful to start an iv so the patient stated she refused the iv and contrast and stated she would just do the MRI without the contrast. I will try and call the doctor office again today to notify them of the situation.

## 2023-09-05 DIAGNOSIS — M54.12 CERVICAL RADICULOPATHY: ICD-10-CM

## 2023-09-05 DIAGNOSIS — M54.2 CHRONIC NECK PAIN: ICD-10-CM

## 2023-09-05 DIAGNOSIS — G89.29 CHRONIC NECK PAIN: ICD-10-CM

## 2023-09-05 RX ORDER — NABUMETONE 750 MG/1
TABLET, FILM COATED ORAL
Qty: 60 TABLET | Refills: 0 | Status: SHIPPED | OUTPATIENT
Start: 2023-09-05 | End: 2024-04-01

## 2023-09-19 ENCOUNTER — PATIENT MESSAGE (OUTPATIENT)
Dept: ADMINISTRATIVE | Facility: HOSPITAL | Age: 53
End: 2023-09-19
Payer: MEDICAID

## 2023-10-09 ENCOUNTER — HOSPITAL ENCOUNTER (OUTPATIENT)
Dept: RADIOLOGY | Facility: HOSPITAL | Age: 53
Discharge: HOME | End: 2023-10-09
Attending: FAMILY MEDICINE
Payer: MEDICAID

## 2023-10-09 DIAGNOSIS — Z12.31 BREAST CANCER SCREENING BY MAMMOGRAM: ICD-10-CM

## 2023-10-09 PROCEDURE — 77067 MAMMO DIGITAL SCREENING BILAT WITH TOMO: ICD-10-PCS | Mod: 26,,, | Performed by: RADIOLOGY

## 2023-10-09 PROCEDURE — 77067 SCR MAMMO BI INCL CAD: CPT | Mod: TC

## 2023-10-09 PROCEDURE — 77067 SCR MAMMO BI INCL CAD: CPT | Mod: 26,,, | Performed by: RADIOLOGY

## 2023-10-09 PROCEDURE — 77063 MAMMO DIGITAL SCREENING BILAT WITH TOMO: ICD-10-PCS | Mod: 26,,, | Performed by: RADIOLOGY

## 2023-10-09 PROCEDURE — 77063 BREAST TOMOSYNTHESIS BI: CPT | Mod: 26,,, | Performed by: RADIOLOGY

## 2023-11-07 ENCOUNTER — HOSPITAL ENCOUNTER (EMERGENCY)
Facility: HOSPITAL | Age: 53
Discharge: HOME | End: 2023-11-07
Attending: EMERGENCY MEDICINE
Payer: MEDICAID

## 2023-11-07 VITALS
HEART RATE: 87 BPM | WEIGHT: 145 LBS | TEMPERATURE: 98 F | DIASTOLIC BLOOD PRESSURE: 88 MMHG | RESPIRATION RATE: 18 BRPM | OXYGEN SATURATION: 100 % | SYSTOLIC BLOOD PRESSURE: 118 MMHG | BODY MASS INDEX: 22.76 KG/M2 | HEIGHT: 67 IN

## 2023-11-07 DIAGNOSIS — A08.4 VIRAL GASTROENTERITIS: Primary | ICD-10-CM

## 2023-11-07 LAB
ALBUMIN SERPL-MCNC: 4.8 G/DL (ref 3.5–5)
ALBUMIN/GLOB SERPL: 1.2 RATIO (ref 1.1–2)
ALP SERPL-CCNC: 69 UNIT/L (ref 40–150)
ALT SERPL-CCNC: 29 UNIT/L (ref 0–55)
APPEARANCE UR: ABNORMAL
AST SERPL-CCNC: 24 UNIT/L (ref 5–34)
BACTERIA #/AREA URNS AUTO: ABNORMAL /HPF
BASOPHILS # BLD AUTO: 0.05 X10(3)/MCL
BASOPHILS NFR BLD AUTO: 0.8 %
BILIRUB SERPL-MCNC: 0.4 MG/DL
BILIRUB UR QL STRIP.AUTO: NEGATIVE
BUN SERPL-MCNC: 23.4 MG/DL (ref 9.8–20.1)
CALCIUM SERPL-MCNC: 10.6 MG/DL (ref 8.4–10.2)
CHLORIDE SERPL-SCNC: 105 MMOL/L (ref 98–107)
CO2 SERPL-SCNC: 18 MMOL/L (ref 22–29)
COLOR UR AUTO: YELLOW
CREAT SERPL-MCNC: 1.04 MG/DL (ref 0.55–1.02)
EOSINOPHIL # BLD AUTO: 0.05 X10(3)/MCL (ref 0–0.9)
EOSINOPHIL NFR BLD AUTO: 0.8 %
ERYTHROCYTE [DISTWIDTH] IN BLOOD BY AUTOMATED COUNT: 12.3 % (ref 11.5–17)
FLUAV AG UPPER RESP QL IA.RAPID: NOT DETECTED
FLUBV AG UPPER RESP QL IA.RAPID: NOT DETECTED
GFR SERPLBLD CREATININE-BSD FMLA CKD-EPI: >60 MLS/MIN/1.73/M2
GLOBULIN SER-MCNC: 4.1 GM/DL (ref 2.4–3.5)
GLUCOSE SERPL-MCNC: 134 MG/DL (ref 74–100)
GLUCOSE UR QL STRIP.AUTO: NORMAL
HCT VFR BLD AUTO: 48.7 % (ref 37–47)
HGB BLD-MCNC: 16.5 G/DL (ref 12–16)
HOLD SPECIMEN: NORMAL
HOLD SPECIMEN: NORMAL
HYALINE CASTS #/AREA URNS LPF: >20 /LPF
IMM GRANULOCYTES # BLD AUTO: 0.07 X10(3)/MCL (ref 0–0.04)
IMM GRANULOCYTES NFR BLD AUTO: 1.1 %
KETONES UR QL STRIP.AUTO: ABNORMAL
LEUKOCYTE ESTERASE UR QL STRIP.AUTO: 25
LYMPHOCYTES # BLD AUTO: 2.06 X10(3)/MCL (ref 0.6–4.6)
LYMPHOCYTES NFR BLD AUTO: 31.9 %
MCH RBC QN AUTO: 31.6 PG (ref 27–31)
MCHC RBC AUTO-ENTMCNC: 33.9 G/DL (ref 33–36)
MCV RBC AUTO: 93.3 FL (ref 80–94)
MONOCYTES # BLD AUTO: 0.85 X10(3)/MCL (ref 0.1–1.3)
MONOCYTES NFR BLD AUTO: 13.2 %
MUCOUS THREADS URNS QL MICRO: ABNORMAL /LPF
NEUTROPHILS # BLD AUTO: 3.37 X10(3)/MCL (ref 2.1–9.2)
NEUTROPHILS NFR BLD AUTO: 52.2 %
NITRITE UR QL STRIP.AUTO: NEGATIVE
NRBC BLD AUTO-RTO: 0 %
PH UR STRIP.AUTO: 5.5 [PH]
PLATELET # BLD AUTO: 338 X10(3)/MCL (ref 130–400)
PMV BLD AUTO: 9.1 FL (ref 7.4–10.4)
POTASSIUM SERPL-SCNC: 3.7 MMOL/L (ref 3.5–5.1)
PROT SERPL-MCNC: 8.9 GM/DL (ref 6.4–8.3)
PROT UR QL STRIP.AUTO: ABNORMAL
RBC # BLD AUTO: 5.22 X10(6)/MCL (ref 4.2–5.4)
RBC #/AREA URNS AUTO: ABNORMAL /HPF
RBC UR QL AUTO: ABNORMAL
SARS-COV-2 RNA RESP QL NAA+PROBE: NOT DETECTED
SODIUM SERPL-SCNC: 137 MMOL/L (ref 136–145)
SP GR UR STRIP.AUTO: 1.03 (ref 1–1.03)
SQUAMOUS #/AREA URNS LPF: ABNORMAL /HPF
UROBILINOGEN UR STRIP-ACNC: NORMAL
WBC # SPEC AUTO: 6.45 X10(3)/MCL (ref 4.5–11.5)
WBC #/AREA URNS AUTO: ABNORMAL /HPF

## 2023-11-07 PROCEDURE — 96361 HYDRATE IV INFUSION ADD-ON: CPT

## 2023-11-07 PROCEDURE — 96360 HYDRATION IV INFUSION INIT: CPT

## 2023-11-07 PROCEDURE — 63600175 PHARM REV CODE 636 W HCPCS: Performed by: PHYSICIAN ASSISTANT

## 2023-11-07 PROCEDURE — 99284 EMERGENCY DEPT VISIT MOD MDM: CPT | Mod: 25

## 2023-11-07 PROCEDURE — 81001 URINALYSIS AUTO W/SCOPE: CPT | Performed by: PHYSICIAN ASSISTANT

## 2023-11-07 PROCEDURE — 80053 COMPREHEN METABOLIC PANEL: CPT | Performed by: PHYSICIAN ASSISTANT

## 2023-11-07 PROCEDURE — 96372 THER/PROPH/DIAG INJ SC/IM: CPT | Performed by: PHYSICIAN ASSISTANT

## 2023-11-07 PROCEDURE — 0240U COVID/FLU A&B PCR: CPT | Performed by: PHYSICIAN ASSISTANT

## 2023-11-07 PROCEDURE — 25000003 PHARM REV CODE 250: Performed by: PHYSICIAN ASSISTANT

## 2023-11-07 PROCEDURE — 85025 COMPLETE CBC W/AUTO DIFF WBC: CPT | Performed by: PHYSICIAN ASSISTANT

## 2023-11-07 RX ORDER — PROMETHAZINE HYDROCHLORIDE 25 MG/ML
12.5 INJECTION, SOLUTION INTRAMUSCULAR; INTRAVENOUS
Status: COMPLETED | OUTPATIENT
Start: 2023-11-07 | End: 2023-11-07

## 2023-11-07 RX ORDER — ONDANSETRON 4 MG/1
8 TABLET, ORALLY DISINTEGRATING ORAL
Status: COMPLETED | OUTPATIENT
Start: 2023-11-07 | End: 2023-11-07

## 2023-11-07 RX ORDER — PROMETHAZINE HYDROCHLORIDE 25 MG/1
25 SUPPOSITORY RECTAL EVERY 6 HOURS PRN
Qty: 10 SUPPOSITORY | Refills: 0 | Status: SHIPPED | OUTPATIENT
Start: 2023-11-07 | End: 2024-04-01

## 2023-11-07 RX ORDER — DICYCLOMINE HYDROCHLORIDE 20 MG/1
20 TABLET ORAL 3 TIMES DAILY
Qty: 15 TABLET | Refills: 0 | Status: SHIPPED | OUTPATIENT
Start: 2023-11-07 | End: 2023-11-12

## 2023-11-07 RX ORDER — DICYCLOMINE HYDROCHLORIDE 10 MG/ML
20 INJECTION INTRAMUSCULAR
Status: COMPLETED | OUTPATIENT
Start: 2023-11-07 | End: 2023-11-07

## 2023-11-07 RX ADMIN — PROMETHAZINE HYDROCHLORIDE 12.5 MG: 25 INJECTION INTRAMUSCULAR; INTRAVENOUS at 08:11

## 2023-11-07 RX ADMIN — DICYCLOMINE HYDROCHLORIDE 20 MG: 20 INJECTION, SOLUTION INTRAMUSCULAR at 08:11

## 2023-11-07 RX ADMIN — ONDANSETRON 8 MG: 4 TABLET, ORALLY DISINTEGRATING ORAL at 06:11

## 2023-11-07 RX ADMIN — SODIUM CHLORIDE, POTASSIUM CHLORIDE, SODIUM LACTATE AND CALCIUM CHLORIDE 1000 ML: 600; 310; 30; 20 INJECTION, SOLUTION INTRAVENOUS at 07:11

## 2023-11-08 NOTE — DISCHARGE INSTRUCTIONS
Take all medications as prescribed.     Drink plenty of fluids and get a lot of rest.     Clear liquid diet for 24 hours then you may advance it as tolerated.    Return to ER for any changes or worsening of symptoms.

## 2023-11-08 NOTE — ED PROVIDER NOTES
Encounter Date: 2023       History     Chief Complaint   Patient presents with    Generalized Body Aches     Patient in with c/o body aches, nausea and diarrhea since yesterday.      51 YO AAF in ER with complaints of N/V/D and body aches since yesterday. Had about 8 episodes of non blood emesis over the last 2 days with about the same number of diarrhea episodes. States family she was with on Saturday also has same symptoms. Denies fever, chills, chest pain, SOB, HA or dizziness. No other complaints.     The history is provided by the patient and the spouse.     Review of patient's allergies indicates:   Allergen Reactions    Ibuprofen Nausea And Vomiting     Burns pt's stomach     Past Medical History:   Diagnosis Date    C5-C7 ACDF 2023    Cervical stenosis of spinal canal 2023    Pituitary mass 2022     Past Surgical History:   Procedure Laterality Date     SECTION  2006    COLECTOMY  2017    COLONOSCOPY  2022    COLONOSCOPY  2017    [facility], Rony Majano MD    NECK SURGERY  2019    SPINE SURGERY  2023    C5-C7 ACDF    TUBAL LIGATION  2006     Family History   Problem Relation Age of Onset    Ovarian cancer Mother 48    Arthritis Father     Colon cancer Father         age 40     Social History     Tobacco Use    Smoking status: Every Day     Current packs/day: 1.00     Types: Cigarettes, Vaping with nicotine    Smokeless tobacco: Never    Tobacco comments:     2023- Pt states no longer smokes; now vapes with nicotine   Substance Use Topics    Alcohol use: Not Currently    Drug use: Yes     Frequency: 2.0 times per week     Types: Marijuana     Review of Systems   Constitutional:  Negative for chills and fever.   HENT:  Negative for congestion and sore throat.    Respiratory:  Negative for shortness of breath.    Cardiovascular:  Negative for chest pain.   Gastrointestinal:  Positive for abdominal pain, diarrhea, nausea and vomiting.   Genitourinary:   Negative for dysuria.   Musculoskeletal:  Negative for back pain.   Skin:  Negative for rash.   Neurological:  Negative for dizziness, weakness, light-headedness and headaches.   Hematological:  Does not bruise/bleed easily.   All other systems reviewed and are negative.      Physical Exam     Initial Vitals [11/07/23 1826]   BP Pulse Resp Temp SpO2   (!) 127/98 103 17 97.9 °F (36.6 °C) 100 %      MAP       --         Physical Exam    Nursing note and vitals reviewed.  Constitutional: She appears well-developed and well-nourished. She is not diaphoretic. No distress.   HENT:   Head: Normocephalic and atraumatic.   Nose: Nose normal.   Eyes: Conjunctivae are normal.   Cardiovascular:  Normal rate, regular rhythm and normal heart sounds.           Pulmonary/Chest: Breath sounds normal.   Abdominal: Abdomen is soft and flat. Bowel sounds are increased. There is abdominal tenderness in the right upper quadrant, epigastric area and left upper quadrant.   No right CVA tenderness.  No left CVA tenderness. There is no rebound and no guarding.   Musculoskeletal:         General: Normal range of motion.     Neurological: She is alert and oriented to person, place, and time.   Skin: Skin is warm and dry.   Psychiatric: She has a normal mood and affect.         ED Course   Procedures  Labs Reviewed   COMPREHENSIVE METABOLIC PANEL - Abnormal; Notable for the following components:       Result Value    Carbon Dioxide 18 (*)     Glucose Level 134 (*)     Blood Urea Nitrogen 23.4 (*)     Creatinine 1.04 (*)     Calcium Level Total 10.6 (*)     Protein Total 8.9 (*)     Globulin 4.1 (*)     All other components within normal limits   URINALYSIS, REFLEX TO URINE CULTURE - Abnormal; Notable for the following components:    Appearance, UA Turbid (*)     Specific Gravity, UA 1.033 (*)     Protein, UA 1+ (*)     Ketones, UA 1+ (*)     Blood, UA 1+ (*)     Leukocyte Esterase, UA 25 (*)     WBC, UA 6-10 (*)     Bacteria, UA Moderate (*)      Squamous Epithelial Cells, UA Moderate (*)     Mucous, UA Many (*)     Hyaline Casts, UA >20 (*)     All other components within normal limits   CBC WITH DIFFERENTIAL - Abnormal; Notable for the following components:    Hgb 16.5 (*)     Hct 48.7 (*)     MCH 31.6 (*)     IG# 0.07 (*)     All other components within normal limits   COVID/FLU A&B PCR - Normal    Narrative:     The Xpert Xpress SARS-CoV-2/FLU/RSV plus is a rapid, multiplexed real-time PCR test intended for the simultaneous qualitative detection and differentiation of SARS-CoV-2, Influenza A, Influenza B, and respiratory syncytial virus (RSV) viral RNA in either nasopharyngeal swab or nasal swab specimens.         CBC W/ AUTO DIFFERENTIAL    Narrative:     The following orders were created for panel order CBC auto differential.  Procedure                               Abnormality         Status                     ---------                               -----------         ------                     CBC with Differential[[id number]]        Abnormal            Final result                 Please view results for these tests on the individual orders.   EXTRA TUBES    Narrative:     The following orders were created for panel order EXTRA TUBES.  Procedure                               Abnormality         Status                     ---------                               -----------         ------                     Light Blue Top Hold[[id number]]                             Final result               Gold Top Hold[[id number]]                                   Final result                 Please view results for these tests on the individual orders.   LIGHT BLUE TOP HOLD   GOLD TOP HOLD          Imaging Results    None          Medications   ondansetron disintegrating tablet 8 mg (8 mg Oral Given 11/7/23 1845)   lactated ringers bolus 1,000 mL (1,000 mLs Intravenous New Bag 11/7/23 1939)   promethazine injection 12.5 mg (12.5 mg Intramuscular Given  11/7/23 2015)   dicyclomine injection 20 mg (20 mg Intramuscular Given 11/7/23 2016)     Medical Decision Making  Amount and/or Complexity of Data Reviewed  Labs: ordered.    Risk  Prescription drug management.               ED Course as of 11/07/23 2109   Tue Nov 07, 2023 2051 VSS, NAD, pt is non-toxic or ill appearing, labs  reviewed with pt, will treat as viral gastroenteritis, tolerated PO challenge, treatment plan and discharge instructions including follow up discussed, pt verbalized understanding, all questions answered, pt is stable and ready for discharge after IVF have finished [TT]      ED Course User Index  [TT] Marlo Hester PA                    Clinical Impression:   Final diagnoses:  [A08.4] Viral gastroenteritis (Primary)        ED Disposition Condition    Discharge Stable          ED Prescriptions       Medication Sig Dispense Start Date End Date Auth. Provider    dicyclomine (BENTYL) 20 mg tablet Take 1 tablet (20 mg total) by mouth 3 (three) times daily. for 5 days 15 tablet 11/7/2023 11/12/2023 Marlo Hester PA    promethazine (PHENERGAN) 25 MG suppository Place 1 suppository (25 mg total) rectally every 6 (six) hours as needed for Nausea. 10 suppository 11/7/2023 -- Marlo Hester PA          Follow-up Information       Follow up With Specialties Details Why Contact Info    Aisha Preston MD Family Medicine Schedule an appointment as soon as possible for a visit in 3 days  [address] [facility] [address]  [phone]      [facility] Emergency Medicine In 3 days As needed, If symptoms worsen [address] [facility] [id number] [phone]             Marlo Hester PA  11/07/23 2110

## 2023-11-17 ENCOUNTER — HOSPITAL ENCOUNTER (EMERGENCY)
Facility: HOSPITAL | Age: 53
Discharge: HOME | End: 2023-11-17
Attending: EMERGENCY MEDICINE
Payer: MEDICAID

## 2023-11-17 VITALS
TEMPERATURE: 98 F | HEART RATE: 117 BPM | BODY MASS INDEX: 22.76 KG/M2 | DIASTOLIC BLOOD PRESSURE: 84 MMHG | OXYGEN SATURATION: 100 % | HEIGHT: 67 IN | SYSTOLIC BLOOD PRESSURE: 138 MMHG | WEIGHT: 145 LBS | RESPIRATION RATE: 18 BRPM

## 2023-11-17 DIAGNOSIS — Z11.3 ROUTINE SCREENING FOR STI (SEXUALLY TRANSMITTED INFECTION): ICD-10-CM

## 2023-11-17 DIAGNOSIS — R30.0 DYSURIA: Primary | ICD-10-CM

## 2023-11-17 LAB
APPEARANCE UR: CLEAR
BACTERIA #/AREA URNS AUTO: ABNORMAL /HPF
BILIRUB UR QL STRIP.AUTO: ABNORMAL
C TRACH DNA SPEC QL NAA+PROBE: NOT DETECTED
CAOX CRY URNS QL MICRO: ABNORMAL /HPF
COLOR UR AUTO: YELLOW
GLUCOSE UR QL STRIP.AUTO: NEGATIVE
KETONES UR QL STRIP.AUTO: NEGATIVE
LEUKOCYTE ESTERASE UR QL STRIP.AUTO: NEGATIVE
N GONORRHOEA DNA SPEC QL NAA+PROBE: NOT DETECTED
NITRITE UR QL STRIP.AUTO: NEGATIVE
PH UR STRIP.AUTO: 5.5 [PH]
PROT UR QL STRIP.AUTO: 30
RBC #/AREA URNS AUTO: ABNORMAL /HPF
RBC UR QL AUTO: NEGATIVE
SOURCE (OHS): NORMAL
SP GR UR STRIP.AUTO: >=1.03 (ref 1–1.03)
SQUAMOUS #/AREA URNS AUTO: ABNORMAL /HPF
UROBILINOGEN UR STRIP-ACNC: 0.2
WBC #/AREA URNS AUTO: ABNORMAL /HPF

## 2023-11-17 PROCEDURE — 96372 THER/PROPH/DIAG INJ SC/IM: CPT | Performed by: NURSE PRACTITIONER

## 2023-11-17 PROCEDURE — 63600175 PHARM REV CODE 636 W HCPCS: Performed by: NURSE PRACTITIONER

## 2023-11-17 PROCEDURE — 81001 URINALYSIS AUTO W/SCOPE: CPT | Performed by: EMERGENCY MEDICINE

## 2023-11-17 PROCEDURE — 87491 CHLMYD TRACH DNA AMP PROBE: CPT | Performed by: NURSE PRACTITIONER

## 2023-11-17 PROCEDURE — 99284 EMERGENCY DEPT VISIT MOD MDM: CPT

## 2023-11-17 PROCEDURE — 63700000 PHARM REV CODE 250 ALT 637 W/O HCPCS: Performed by: NURSE PRACTITIONER

## 2023-11-17 RX ORDER — CEFTRIAXONE 500 MG/1
500 INJECTION, POWDER, FOR SOLUTION INTRAMUSCULAR; INTRAVENOUS
Status: COMPLETED | OUTPATIENT
Start: 2023-11-17 | End: 2023-11-17

## 2023-11-17 RX ORDER — AZITHROMYCIN 250 MG/1
1000 TABLET, FILM COATED ORAL
Status: COMPLETED | OUTPATIENT
Start: 2023-11-17 | End: 2023-11-17

## 2023-11-17 RX ADMIN — CEFTRIAXONE SODIUM 500 MG: 500 INJECTION, POWDER, FOR SOLUTION INTRAMUSCULAR; INTRAVENOUS at 12:11

## 2023-11-17 RX ADMIN — AZITHROMYCIN DIHYDRATE 1000 MG: 250 TABLET ORAL at 12:11
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
## 2023-12-04 ENCOUNTER — OFFICE VISIT (OUTPATIENT)
Dept: OPHTHALMOLOGY | Facility: CLINIC | Age: 53
End: 2023-12-04
Payer: MEDICAID

## 2023-12-04 VITALS — WEIGHT: 145 LBS | HEIGHT: 67 IN | BODY MASS INDEX: 22.76 KG/M2

## 2023-12-04 DIAGNOSIS — S05.8X2A EYE TRAUMA, SUPERFICIAL, LEFT, INITIAL ENCOUNTER: ICD-10-CM

## 2023-12-04 DIAGNOSIS — H53.19 PHOTOPSIA OF LEFT EYE: Primary | ICD-10-CM

## 2023-12-04 DIAGNOSIS — H50.34 EXOTROPIA, ALTERNATING, INTERMITTENT: ICD-10-CM

## 2023-12-04 PROCEDURE — 99213 OFFICE O/P EST LOW 20 MIN: CPT | Mod: PBBFAC,PN

## 2023-12-04 RX ORDER — TRAMADOL HYDROCHLORIDE 50 MG/1
TABLET ORAL
COMMUNITY
Start: 2023-05-12 | End: 2024-04-01

## 2023-12-04 RX ORDER — ATROPINE SULFATE 10 MG/ML
1 SOLUTION/ DROPS OPHTHALMIC 2 TIMES DAILY
Qty: 5 ML | Refills: 0 | Status: SHIPPED | OUTPATIENT
Start: 2023-12-04

## 2023-12-04 RX ORDER — PREGABALIN 100 MG/1
150 CAPSULE ORAL 3 TIMES DAILY
COMMUNITY

## 2023-12-04 RX ORDER — DICLOFENAC SODIUM 75 MG/1
75 TABLET, DELAYED RELEASE ORAL 2 TIMES DAILY
COMMUNITY
Start: 2023-11-15 | End: 2024-04-01

## 2023-12-04 RX ORDER — VALACYCLOVIR HYDROCHLORIDE 1 G/1
TABLET, FILM COATED ORAL
COMMUNITY
Start: 2023-07-20

## 2023-12-04 RX ORDER — TIZANIDINE 4 MG/1
4 TABLET ORAL 3 TIMES DAILY
COMMUNITY
End: 2024-04-01

## 2023-12-04 RX ORDER — CYCLOBENZAPRINE HCL 10 MG
TABLET ORAL
COMMUNITY
Start: 2023-06-13 | End: 2024-04-01
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
## 2024-01-25 ENCOUNTER — OFFICE VISIT (OUTPATIENT)
Dept: GYNECOLOGY | Facility: CLINIC | Age: 54
End: 2024-01-25
Payer: MEDICAID

## 2024-01-25 VITALS
RESPIRATION RATE: 18 BRPM | SYSTOLIC BLOOD PRESSURE: 114 MMHG | OXYGEN SATURATION: 100 % | WEIGHT: 166.38 LBS | HEIGHT: 69 IN | HEART RATE: 79 BPM | BODY MASS INDEX: 24.64 KG/M2 | DIASTOLIC BLOOD PRESSURE: 76 MMHG | TEMPERATURE: 98 F

## 2024-01-25 DIAGNOSIS — Z01.419 WOMEN'S ANNUAL ROUTINE GYNECOLOGICAL EXAMINATION: Primary | ICD-10-CM

## 2024-01-25 DIAGNOSIS — N89.8 VAGINAL DRYNESS: ICD-10-CM

## 2024-01-25 DIAGNOSIS — N39.46 MIXED STRESS AND URGE URINARY INCONTINENCE: ICD-10-CM

## 2024-01-25 DIAGNOSIS — R10.2 PELVIC PAIN: ICD-10-CM

## 2024-01-25 DIAGNOSIS — Z12.31 SCREENING MAMMOGRAM FOR BREAST CANCER: ICD-10-CM

## 2024-01-25 DIAGNOSIS — Z11.3 ROUTINE SCREENING FOR STI (SEXUALLY TRANSMITTED INFECTION): ICD-10-CM

## 2024-01-25 LAB
BILIRUB SERPL-MCNC: NEGATIVE MG/DL
BLOOD URINE, POC: NEGATIVE
C TRACH DNA SPEC QL NAA+PROBE: NOT DETECTED
CLUE CELLS VAG QL WET PREP: ABNORMAL
COLOR, POC UA: YELLOW
GLUCOSE UR QL STRIP: NEGATIVE
KETONES UR QL STRIP: NEGATIVE
LEUKOCYTE ESTERASE URINE, POC: NEGATIVE
N GONORRHOEA DNA SPEC QL NAA+PROBE: NOT DETECTED
NITRITE, POC UA: NEGATIVE
PH, POC UA: 6
PROTEIN, POC: NEGATIVE
SOURCE (OHS): NORMAL
SPECIFIC GRAVITY, POC UA: 1
T VAGINALIS VAG QL WET PREP: ABNORMAL
UROBILINOGEN, POC UA: 0.2
WBC #/AREA VAG WET PREP: ABNORMAL
YEAST SPEC QL WET PREP: ABNORMAL

## 2024-01-25 PROCEDURE — 87491 CHLMYD TRACH DNA AMP PROBE: CPT | Performed by: NURSE PRACTITIONER

## 2024-01-25 PROCEDURE — 87210 SMEAR WET MOUNT SALINE/INK: CPT | Performed by: NURSE PRACTITIONER

## 2024-01-25 PROCEDURE — 81001 URINALYSIS AUTO W/SCOPE: CPT | Mod: PBBFAC | Performed by: NURSE PRACTITIONER

## 2024-01-25 PROCEDURE — 3078F DIAST BP <80 MM HG: CPT | Mod: CPTII,,, | Performed by: NURSE PRACTITIONER

## 2024-01-25 PROCEDURE — 3074F SYST BP LT 130 MM HG: CPT | Mod: CPTII,,, | Performed by: NURSE PRACTITIONER

## 2024-01-25 PROCEDURE — 88174 CYTOPATH C/V AUTO IN FLUID: CPT | Performed by: NURSE PRACTITIONER

## 2024-01-25 PROCEDURE — 99396 PREV VISIT EST AGE 40-64: CPT | Mod: S$PBB,,, | Performed by: NURSE PRACTITIONER

## 2024-01-25 PROCEDURE — 3008F BODY MASS INDEX DOCD: CPT | Mod: CPTII,,, | Performed by: NURSE PRACTITIONER

## 2024-01-25 PROCEDURE — 1159F MED LIST DOCD IN RCRD: CPT | Mod: CPTII,,, | Performed by: NURSE PRACTITIONER

## 2024-01-25 PROCEDURE — 1160F RVW MEDS BY RX/DR IN RCRD: CPT | Mod: CPTII,,, | Performed by: NURSE PRACTITIONER

## 2024-01-25 PROCEDURE — 87624 HPV HI-RISK TYP POOLED RSLT: CPT

## 2024-01-25 PROCEDURE — 99215 OFFICE O/P EST HI 40 MIN: CPT | Mod: PBBFAC | Performed by: NURSE PRACTITIONER

## 2024-01-25 NOTE — PROGRESS NOTES
"Patient ID: Galina Walker is a 53 y.o. female.    Chief Complaint: Annual Exam      Review of patient's allergies indicates:   Allergen Reactions    Ibuprofen Nausea And Vomiting and Other (See Comments)     Burns pt's stomach             HPI:  Pt is  (IUFD x 1; one set of twins; csectionx2) here for annual gyn exam. Denies hx of abnormal pap. LMP . Pt is postmenopausal. Today, pt c/o pelvic pain occurring 3x per week onset over a year ago. States pain is worse with intercourse. States has BM daily and denies hematochezia. Pt also c/o vaginal dryness. States dryness is not  improved with otc lubricants. Pt denies dysuria/hematuria but reports urinary frequency and incomplete bladder emptying.  Reports occasional postcoital spotting. Admits to drinking 2-3 thermos of coffee/day. Pt is smoker.     PMHx-SBO (treated with bowel rest/NGT), migraine, colon cancer (no chemo/radiation)-states is followed by Dr. Roberts for screening  PSHx-colon resection, TL  Family Hx- mother with hx ovarian cancer in her late 40s  Review of Systems:   Negative except for findings in HPI     Objective:   /76   Pulse 79   Temp 98.1 °F (36.7 °C) (Oral)   Resp 18   Ht 5' 9" (1.753 m)   Wt 75.5 kg (166 lb 6.4 oz)   SpO2 100%   BMI 24.57 kg/m²    Physical Exam:  General: Alert and oriented, No acute distress.  Breast: No mass, No tenderness, No discharge.  Gastrointestinal: Soft, Non-tender.  Gynecology:  Labia: Bilateral, Majora, Minora, Within normal limits.  Vagina: pale mucosa. tenderness along bladder wall  Cervix: No cervical motion tenderness, No lesions.  Uterus: retroverted.8 weeks; non tender  Ovaries: Not tender, No mass.+right sided pelvic floor tenderness  Integumentary: Warm, Dry.  Neurologic: Alert, Oriented.  Psychiatric: Cooperative, Appropriate mood & affect. NO SI/HI  Assessment:   Women's annual routine gynecological examination  -     Liquid-Based Pap Smear, Screening Screening    Pelvic pain  -   "   POCT urinalysis, dipstick or tablet reag  -     US Pelvis Comp with Transvag NON-OB (xpd; Future; Expected date: 02/01/2024  -     Ambulatory referral/consult to Physical/Occupational Therapy; Future; Expected date: 02/01/2024    Routine screening for STI (sexually transmitted infection)  -     Chlamydia/GC, PCR  -     Wet Prep, Genital  -     SYPHILIS ANTIBODY (WITH REFLEX RPR); Future; Expected date: 01/25/2024  -     HIV 1/2 Ag/Ab (4th Gen); Future; Expected date: 01/25/2024  -     Hepatitis B Surface Antigen; Future; Expected date: 01/25/2024  -     Hepatitis C Antibody; Future; Expected date: 01/25/2024    Screening mammogram for breast cancer  -     Mammo Digital Screening Bilat w/ Gerald; Future; Expected date: 11/01/2024    Mixed stress and urge urinary incontinence  -     Ambulatory referral/consult to Urology; Future; Expected date: 04/25/2024  -     Ambulatory referral/consult to Physical/Occupational Therapy; Future; Expected date: 02/01/2024    Vaginal dryness  -     conjugated estrogens (PREMARIN) vaginal cream; Insert 0.5g vaginally every night for 2 weeks then twice per week  Dispense: 1 applicator; Refill: 3            1. Women's annual routine gynecological examination    2. Pelvic pain    3. Routine screening for STI (sexually transmitted infection)    4. Screening mammogram for breast cancer    5. Mixed stress and urge urinary incontinence    6. Vaginal dryness             -strongly encouraged decreased caffeine intake; educated pt on kegel exercises and encouraged her to perform 3x per day; refer to urology and pelvic floor PT; also encouraged pt to follow IC diet; handouts provided  -Contact clinic with any vaginal bleeding as this would be abnormal in postmenopausal pt.   -continue using ky jelly during intercourse; will add vaginal premarin ointment  Plan:       Follow up in about 1 year (around 1/25/2025).

## 2024-01-29 ENCOUNTER — LAB VISIT (OUTPATIENT)
Dept: LAB | Facility: HOSPITAL | Age: 54
End: 2024-01-29
Attending: NURSE PRACTITIONER
Payer: MEDICAID

## 2024-01-29 DIAGNOSIS — Z11.3 ROUTINE SCREENING FOR STI (SEXUALLY TRANSMITTED INFECTION): ICD-10-CM

## 2024-01-29 LAB
HBV SURFACE AG SERPL QL IA: NONREACTIVE
HCV AB SERPL QL IA: REACTIVE
HIV 1+2 AB+HIV1 P24 AG SERPL QL IA: NONREACTIVE
T PALLIDUM AB SER QL: NONREACTIVE

## 2024-01-29 PROCEDURE — 87389 HIV-1 AG W/HIV-1&-2 AB AG IA: CPT

## 2024-01-29 PROCEDURE — 86803 HEPATITIS C AB TEST: CPT

## 2024-01-29 PROCEDURE — 36415 COLL VENOUS BLD VENIPUNCTURE: CPT

## 2024-01-29 PROCEDURE — 86780 TREPONEMA PALLIDUM: CPT

## 2024-01-29 PROCEDURE — 87340 HEPATITIS B SURFACE AG IA: CPT

## 2024-01-30 ENCOUNTER — TELEPHONE (OUTPATIENT)
Dept: GYNECOLOGY | Facility: CLINIC | Age: 54
End: 2024-01-30
Payer: MEDICAID

## 2024-01-30 ENCOUNTER — LAB VISIT (OUTPATIENT)
Dept: LAB | Facility: HOSPITAL | Age: 54
End: 2024-01-30
Attending: NURSE PRACTITIONER
Payer: MEDICAID

## 2024-01-30 DIAGNOSIS — Z11.59 NEED FOR HEPATITIS C SCREENING TEST: ICD-10-CM

## 2024-01-30 DIAGNOSIS — R76.8 HEPATITIS C ANTIBODY TEST POSITIVE: Primary | ICD-10-CM

## 2024-01-30 DIAGNOSIS — R76.8 HEPATITIS C ANTIBODY TEST POSITIVE: ICD-10-CM

## 2024-01-30 DIAGNOSIS — Z11.4 ENCOUNTER FOR SCREENING FOR HIV: ICD-10-CM

## 2024-01-30 LAB
HCV AB SERPL QL IA: REACTIVE
HIV 1+2 AB+HIV1 P24 AG SERPL QL IA: NONREACTIVE

## 2024-01-30 PROCEDURE — 36415 COLL VENOUS BLD VENIPUNCTURE: CPT

## 2024-01-30 PROCEDURE — 87389 HIV-1 AG W/HIV-1&-2 AB AG IA: CPT

## 2024-01-30 PROCEDURE — 86803 HEPATITIS C AB TEST: CPT

## 2024-01-30 PROCEDURE — 87522 HEPATITIS C REVRS TRNSCRPJ: CPT

## 2024-01-30 NOTE — TELEPHONE ENCOUNTER
Contacted pt with results of Hep C Ab. Denies personal history or known contacts. Denies hx of IVDU. Pt states received a blood transfusion at the age of 28. Instructed pt report to lab to completed additional labs. States will go today.

## 2024-01-31 LAB
HCV RNA SERPL NAA+PROBE-ACNC: NORMAL IU/ML
PSYCHE PATHOLOGY RESULT: NORMAL

## 2024-02-01 ENCOUNTER — TELEPHONE (OUTPATIENT)
Dept: GYNECOLOGY | Facility: CLINIC | Age: 54
End: 2024-02-01
Payer: MEDICAID

## 2024-02-01 DIAGNOSIS — R76.8 HEPATITIS C ANTIBODY TEST POSITIVE: Primary | ICD-10-CM

## 2024-02-01 NOTE — TELEPHONE ENCOUNTER
Pt informed Hep C RNA was undetectable. Will repeat in 3-6 months. Pt aware and will report to lab in that timeframe.

## 2024-02-02 ENCOUNTER — HOSPITAL ENCOUNTER (OUTPATIENT)
Dept: RADIOLOGY | Facility: HOSPITAL | Age: 54
Discharge: HOME | End: 2024-02-02
Attending: NURSE PRACTITIONER
Payer: MEDICAID

## 2024-02-02 DIAGNOSIS — R10.2 PELVIC PAIN: ICD-10-CM

## 2024-02-02 PROCEDURE — 76830 TRANSVAGINAL US NON-OB: CPT | Mod: TC

## 2024-02-05 ENCOUNTER — HOSPITAL ENCOUNTER (EMERGENCY)
Facility: HOSPITAL | Age: 54
Discharge: HOME | End: 2024-02-05
Attending: SPECIALIST
Payer: MEDICAID

## 2024-02-05 VITALS
RESPIRATION RATE: 18 BRPM | OXYGEN SATURATION: 100 % | BODY MASS INDEX: 24.59 KG/M2 | HEIGHT: 69 IN | WEIGHT: 166 LBS | DIASTOLIC BLOOD PRESSURE: 76 MMHG | TEMPERATURE: 98 F | SYSTOLIC BLOOD PRESSURE: 112 MMHG | HEART RATE: 85 BPM

## 2024-02-05 DIAGNOSIS — D25.1 FIBROIDS, INTRAMURAL: Primary | ICD-10-CM

## 2024-02-05 DIAGNOSIS — H10.501 BLEPHAROCONJUNCTIVITIS OF RIGHT EYE, UNSPECIFIED BLEPHAROCONJUNCTIVITIS TYPE: ICD-10-CM

## 2024-02-05 LAB
APPEARANCE UR: CLEAR
B-HCG SERPL QL: NEGATIVE
BACTERIA #/AREA URNS AUTO: NORMAL /HPF
BILIRUB UR QL STRIP.AUTO: NEGATIVE
COLOR UR AUTO: YELLOW
FLUAV AG UPPER RESP QL IA.RAPID: NOT DETECTED
FLUBV AG UPPER RESP QL IA.RAPID: NOT DETECTED
GLUCOSE UR QL STRIP.AUTO: NEGATIVE
KETONES UR QL STRIP.AUTO: NEGATIVE
LEUKOCYTE ESTERASE UR QL STRIP.AUTO: NEGATIVE
NITRITE UR QL STRIP.AUTO: NEGATIVE
PH UR STRIP.AUTO: 5.5 [PH]
PROT UR QL STRIP.AUTO: NEGATIVE
RBC #/AREA URNS AUTO: NORMAL /HPF
RBC UR QL AUTO: ABNORMAL
RSV A 5' UTR RNA NPH QL NAA+PROBE: NOT DETECTED
SARS-COV-2 RNA RESP QL NAA+PROBE: NOT DETECTED
SP GR UR STRIP.AUTO: >1.03 (ref 1–1.03)
SQUAMOUS #/AREA URNS AUTO: NORMAL /HPF
UROBILINOGEN UR STRIP-ACNC: 0.2
WBC #/AREA URNS AUTO: NORMAL /HPF

## 2024-02-05 PROCEDURE — 99284 EMERGENCY DEPT VISIT MOD MDM: CPT

## 2024-02-05 PROCEDURE — 0241U COVID/RSV/FLU A&B PCR: CPT | Performed by: SPECIALIST

## 2024-02-05 PROCEDURE — 81003 URINALYSIS AUTO W/O SCOPE: CPT | Performed by: SPECIALIST

## 2024-02-05 PROCEDURE — 81025 URINE PREGNANCY TEST: CPT | Performed by: SPECIALIST

## 2024-02-05 PROCEDURE — 25000003 PHARM REV CODE 250: Performed by: SPECIALIST

## 2024-02-05 RX ORDER — NAPROXEN 500 MG/1
500 TABLET ORAL 2 TIMES DAILY PRN
Qty: 20 TABLET | Refills: 0 | Status: SHIPPED | OUTPATIENT
Start: 2024-02-05 | End: 2024-04-01

## 2024-02-05 RX ORDER — KETOROLAC TROMETHAMINE 10 MG/1
10 TABLET, FILM COATED ORAL
Status: COMPLETED | OUTPATIENT
Start: 2024-02-05 | End: 2024-02-05

## 2024-02-05 RX ORDER — NEOMYCIN SULFATE, POLYMYXIN B SULFATE AND DEXAMETHASONE 3.5; 10000; 1 MG/ML; [USP'U]/ML; MG/ML
1 SUSPENSION/ DROPS OPHTHALMIC EVERY 4 HOURS
Qty: 5 ML | Refills: 0 | Status: SHIPPED | OUTPATIENT
Start: 2024-02-05 | End: 2024-02-10

## 2024-02-05 RX ADMIN — KETOROLAC TROMETHAMINE 10 MG: 10 TABLET, FILM COATED ORAL at 08:02

## 2024-02-05 NOTE — Clinical Note
"Galina Rochapernell Walker was seen and treated in our emergency department on 2/5/2024.  She may return to work on 02/06/2024.  Able to return to work 2/6/24     If you have any questions or concerns, please don't hesitate to call.      MD Juan A ED [facility] RN    "

## 2024-02-05 NOTE — Clinical Note
"Galina Rochapernell Walker was seen and treated in our emergency department on 2/5/2024.  She may return to work on 02/07/2024.       If you have any questions or concerns, please don't hesitate to call.      Nichol Smith RN    "

## 2024-02-06 NOTE — ED PROVIDER NOTES
Encounter Date: 2/5/2024       History     Chief Complaint   Patient presents with    Abdominal Pain     Lower abd pain and cramping for the last 2-3 weeks. She was seen by md at [facility] and had pelvic us done on Friday but has not followed up for results.  Has nausea but no vomiting. Last bm today. She states she does not have burning when she urinates but does have some cramping.     Nausea    Cough    Nasal Congestion     Pelvic pain for months and right eye swelling for years; patient also had some cough and nasal congestion with some nausea ;  Patient reported she was seen by gynecology but has not received her ultrasound report    The history is provided by the patient.     Review of patient's allergies indicates:   Allergen Reactions    Ibuprofen Nausea Only     No past medical history on file.  No past surgical history on file.  No family history on file.     Review of Systems   Constitutional: Negative.    HENT: Negative.     Respiratory: Negative.     Cardiovascular: Negative.    Gastrointestinal: Negative.    Musculoskeletal: Negative.    Skin: Negative.    Neurological: Negative.    All other systems reviewed and are negative.      Physical Exam     Initial Vitals [02/05/24 1826]   BP Pulse Resp Temp SpO2   112/76 85 18 98.2 °F (36.8 °C) 100 %      MAP       --         Physical Exam    Nursing note and vitals reviewed.  Constitutional: She appears well-developed and well-nourished.   HENT:   Head: Normocephalic and atraumatic.   Eyes: EOM are normal. Pupils are equal, round, and reactive to light.   Right conjunctival with slight erythema and slight lid swelling, no erythema to eyelids   Neck: Neck supple. Tracheal deviation: pelvic, mild-to-moderate to palpation.   Normal range of motion.  Cardiovascular:  Normal rate, regular rhythm, normal heart sounds and intact distal pulses.           Pulmonary/Chest: Breath sounds normal. She has no wheezes.   Abdominal: Abdomen is soft. She exhibits no distension.  There is abdominal tenderness. There is no rebound and no guarding.   Musculoskeletal:         General: Normal range of motion.      Cervical back: Normal range of motion and neck supple.     Neurological: She is alert and oriented to person, place, and time. She has normal strength. GCS score is 15. GCS eye subscore is 4. GCS verbal subscore is 5. GCS motor subscore is 6.   Skin: Skin is warm and dry.         ED Course   Procedures  Labs Reviewed   URINALYSIS, REFLEX TO URINE CULTURE - Abnormal; Notable for the following components:       Result Value    Specific Gravity, UA >1.030 (*)     Blood, UA Trace-Intact (*)     All other components within normal limits   COVID/RSV/FLU A&B PCR - Normal    Narrative:     The Xpert Xpress SARS-CoV-2/FLU/RSV plus is a rapid, multiplexed real-time PCR test intended for the simultaneous qualitative detection and differentiation of SARS-CoV-2, Influenza A, Influenza B, and respiratory syncytial virus (RSV) viral RNA in either nasopharyngeal swab or nasal swab specimens.         PREGNANCY TEST, URINE RAPID - Normal   URINALYSIS, MICROSCOPIC - Normal          Imaging Results    None          Medications   ketorolac tablet 10 mg (10 mg Oral Given 2/5/24 2023)     Medical Decision Making  Pelvic pain for months and right eye swelling for years; patient also had some cough and nasal congestion with some nausea ;  Patient reported she was seen by gynecology but has not received her ultrasound report    DIFFERENTIAL DIAGNOSIS- fibroids, conjunctivitis, COVID, flu    Amount and/or Complexity of Data Reviewed  Labs: ordered. Decision-making details documented in ED Course.    Risk  Prescription drug management.  Risk Details: Workup negative; I gave patient her ultrasound report showing 3 fibroid tumors and encouraged her to follow up with Gynecology; she was prescribe Maxitrol eyedrops and naproxen for her pelvic discomfort       Patient Vitals for the past 24 hrs:   BP Temp Pulse Resp  "SpO2 Height Weight   02/05/24 1826 112/76 98.2 °F (36.8 °C) 85 18 100 % 5' 9" (1.753 m) 75.3 kg (166 lb)                   The patient is resting comfortably and in no acute distress.  She states that her symptoms have improved after treatment in Emergency Department. I personally discussed her test results and treatment plan.  Gave strict ED precautions.  Specific conditions for return to the emergency department and importance of follow up with her primary care provided or the physician listed on the discharge instructions.  Patient voices understanding and agrees to the plan discussed. All patients' questions have been answered at this time.   She has remained hemodynamically stable throughout entire stay in ED and is stable for discharge home.                 Clinical Impression:  Final diagnoses:  [D25.1] Fibroids, intramural (Primary)  [H10.501] Blepharoconjunctivitis of right eye, unspecified blepharoconjunctivitis type          ED Disposition Condition    Discharge Stable          ED Prescriptions       Medication Sig Dispense Start Date End Date Auth. Provider    naproxen (NAPROSYN) 500 MG tablet Take 1 tablet (500 mg total) by mouth 2 (two) times daily as needed (pain). 20 tablet 2/5/2024 -- Colton Srinivasan MD    neomycin-polymyxin-dexamethasone (MAXITROL) 3.5mg/mL-10,000 unit/mL-0.1 % DrpS Place 1 drop into the right eye every 4 (four) hours. for 5 days 5 mL 2/5/2024 2/10/2024 Colton Srinivasan MD          Follow-up Information       Follow up With Specialties Details Why Contact Info    OBGYN  Schedule an appointment as soon as possible for a visit                oClton Srinivasan MD  02/06/24 0042    "

## 2024-02-07 ENCOUNTER — TELEPHONE (OUTPATIENT)
Dept: GYNECOLOGY | Facility: CLINIC | Age: 54
End: 2024-02-07
Payer: MEDICAID

## 2024-02-16 ENCOUNTER — OFFICE VISIT (OUTPATIENT)
Dept: FAMILY MEDICINE | Facility: CLINIC | Age: 54
End: 2024-02-16
Payer: MEDICAID

## 2024-02-16 VITALS
RESPIRATION RATE: 18 BRPM | WEIGHT: 159 LBS | SYSTOLIC BLOOD PRESSURE: 130 MMHG | BODY MASS INDEX: 23.55 KG/M2 | DIASTOLIC BLOOD PRESSURE: 85 MMHG | HEART RATE: 77 BPM | TEMPERATURE: 99 F | HEIGHT: 69 IN

## 2024-02-16 DIAGNOSIS — F41.1 GAD (GENERALIZED ANXIETY DISORDER): Primary | ICD-10-CM

## 2024-02-16 DIAGNOSIS — R79.89 ELEVATED SERUM CREATININE: ICD-10-CM

## 2024-02-16 DIAGNOSIS — Z23 IMMUNIZATION DUE: ICD-10-CM

## 2024-02-16 DIAGNOSIS — E78.5 HYPERLIPIDEMIA, UNSPECIFIED HYPERLIPIDEMIA TYPE: ICD-10-CM

## 2024-02-16 PROCEDURE — 90750 HZV VACC RECOMBINANT IM: CPT | Mod: PBBFAC

## 2024-02-16 PROCEDURE — 99214 OFFICE O/P EST MOD 30 MIN: CPT | Mod: S$PBB,,, | Performed by: FAMILY MEDICINE

## 2024-02-16 PROCEDURE — 3075F SYST BP GE 130 - 139MM HG: CPT | Mod: CPTII,,, | Performed by: FAMILY MEDICINE

## 2024-02-16 PROCEDURE — 3079F DIAST BP 80-89 MM HG: CPT | Mod: CPTII,,, | Performed by: FAMILY MEDICINE

## 2024-02-16 PROCEDURE — 3008F BODY MASS INDEX DOCD: CPT | Mod: CPTII,,, | Performed by: FAMILY MEDICINE

## 2024-02-16 PROCEDURE — 99214 OFFICE O/P EST MOD 30 MIN: CPT | Mod: PBBFAC,25 | Performed by: FAMILY MEDICINE

## 2024-02-16 PROCEDURE — 90471 IMMUNIZATION ADMIN: CPT | Mod: PBBFAC

## 2024-02-16 RX ORDER — HYDROXYZINE HYDROCHLORIDE 25 MG/1
25 TABLET, FILM COATED ORAL 3 TIMES DAILY PRN
Qty: 90 TABLET | Refills: 1 | Status: SHIPPED | OUTPATIENT
Start: 2024-02-16

## 2024-02-16 RX ORDER — ESCITALOPRAM OXALATE 10 MG/1
10 TABLET ORAL DAILY
Qty: 30 TABLET | Refills: 1 | Status: SHIPPED | OUTPATIENT
Start: 2024-02-16 | End: 2024-04-01 | Stop reason: SDUPTHER

## 2024-02-16 RX ADMIN — ZOSTER VACCINE RECOMBINANT, ADJUVANTED 0.5 ML: KIT at 09:02

## 2024-02-16 NOTE — PROGRESS NOTES
Patient Name: Galina Walker   : 1970  MRN: [id number]     SUBJECTIVE:  Galina Walker is a 53 y.o. female here for Anxiety (Better at night with the medication but in general anxiety has worsened. Would like something during the day./Has trouble focusing)  .    HPI  Here for anxiety follow-up  Last visit with us, we started Elavil to help with the sleeping/anxiety/migraine.  Patient states she has been taking it and tolerating it well.  Sleeping well with the Elavil but not affecting the anxiety that much.  Feeling anxious during the day.  Patient does have history of anxiety in the past and has been on multiple antidepressants in the past. Used to see a counselor in the past. Used to follow with [facility].  Work has been very stressful, can not focus. Works as . Used to be on seroquel but did not like it.  Thinks that she used to be on Lexapro and that did help.  Denies any depressive mood.  No suicidal ideations.    Follows with Neurosurgery for cervical stenosis/chronic neck pain, pituitary mass.  Has also seen orthopedics for shoulder pain.  Seeing Gynecology for regular well-woman exams/pelvic pain/uterine leiomyoma.  She was referred by them to Urology for urinary frequency and incomplete bladder emptying.    ALLERGIES:   Review of patient's allergies indicates:   Allergen Reactions    Ibuprofen Nausea And Vomiting and Other (See Comments)     Burns pt's stomach         ROS:  Review of Systems   Constitutional:  Negative for chills and fever.   HENT:  Negative for congestion.    Eyes:  Negative for blurred vision.   Respiratory:  Negative for cough and shortness of breath.    Cardiovascular:  Negative for chest pain, palpitations and leg swelling.   Gastrointestinal:  Negative for nausea and vomiting.   Genitourinary:  Negative for dysuria.   Psychiatric/Behavioral:  Negative for depression and suicidal ideas. The patient is nervous/anxious. The patient does not have  "insomnia.          OBJECTIVE:  Vital signs  Vitals:    02/16/24 0903   BP: 130/85   Pulse: 77   Resp: 18   Temp: 98.7 °F (37.1 °C)   TempSrc: Oral   Weight: 72.1 kg (159 lb)   Height: 5' 9" (1.753 m)      Body mass index is 23.48 kg/m².    PHYSICAL EXAM:   Physical Exam  Vitals reviewed.   Constitutional:       General: She is not in acute distress.     Appearance: Normal appearance. She is not ill-appearing.   HENT:      Head: Normocephalic and atraumatic.      Right Ear: External ear normal.      Left Ear: External ear normal.      Nose: Nose normal.      Mouth/Throat:      Mouth: Mucous membranes are moist.   Eyes:      General: No scleral icterus.        Right eye: No discharge.         Left eye: No discharge.      Conjunctiva/sclera: Conjunctivae normal.      Pupils: Pupils are equal, round, and reactive to light.   Cardiovascular:      Rate and Rhythm: Normal rate and regular rhythm.   Pulmonary:      Effort: Pulmonary effort is normal. No respiratory distress.      Breath sounds: No wheezing, rhonchi or rales.   Abdominal:      General: Bowel sounds are normal. There is no distension.      Palpations: Abdomen is soft.      Tenderness: There is no abdominal tenderness.   Musculoskeletal:      Cervical back: Normal range of motion. No rigidity.      Right lower leg: No edema.      Left lower leg: No edema.   Skin:     General: Skin is warm.      Coloration: Skin is not pale.      Findings: No rash.   Neurological:      General: No focal deficit present.      Mental Status: She is alert and oriented to person, place, and time.   Psychiatric:         Mood and Affect: Mood is anxious.         Behavior: Behavior normal.          ASSESSMENT/PLAN:  1. RODNEY (generalized anxiety disorder)  Uncontrolled.  Agreeable to start medication.  Start Lexapro 10 mg daily and hydroxyzine if needed.  Close follow up in 6 weeks if she has not seen Psychiatry yet.  Referral placed because of failing multiple antidepressants in the " past.  -     EScitalopram oxalate (LEXAPRO) 10 MG tablet; Take 1 tablet (10 mg total) by mouth once daily.  Dispense: 30 tablet; Refill: 1  -     Ambulatory referral/consult to Psychiatry; Future; Expected date: 02/23/2024  -     hydrOXYzine HCL (ATARAX) 25 MG tablet; Take 1 tablet (25 mg total) by mouth 3 (three) times daily as needed for Anxiety.  Dispense: 90 tablet; Refill: 1    2. Elevated serum creatinine  Recheck BMP.  Avoid NSAIDs.  Drink plenty of water.  -     Basic Metabolic Panel; Future; Expected date: 02/16/2024    3. Hyperlipidemia, unspecified hyperlipidemia type  Trying to watch diet.  Repeat lipid panel.  The 10-year ASCVD risk score (Delgado WELCH, et al., 2019) is: 4.9%*    Values used to calculate the score:      Age: 53 years      Sex: Female      Is Non- : Yes      Diabetic: No      Tobacco smoker: Yes      Systolic Blood Pressure: 130 mmHg      Is BP treated: No      HDL Cholesterol: 63 mg/dL*      Total Cholesterol: 237 mg/dL*      * - Cholesterol units were assumed for this score calculation    -     TSH; Future; Expected date: 02/16/2024  -     Lipid Panel; Future; Expected date: 02/16/2024    4. Immunization due  -     varicella-zoster [id number] (PF)(SHINGRIX) 50 mcg/0.5 mL injection             Previous medical history/lab work/radiology reviewed and considered during medical management decisions.   Medication list reviewed and medication reconciliation performed.  Patient was provided  and care about his/her current diagnosis (es) and medications including risk/benefit and side effects/adverse events, over the counter medication uses/doses, home self-care and contact precautions,  and red flags and indications for when to seek immediate medical attention.   Patient was advised to continue compliance with current medication list and medical recommendations.  Recommended/ Advised continued compliance with recommended eating habits/ diets for medical conditions  and exercise 150 minutes/ week (if possible) for medical condition (s).        RESULTS:  Recent Results (from the past 1008 hour(s))   Liquid-Based Pap Smear, Screening Screening    Collection Time: 01/25/24  3:19 PM   Result Value Ref Range    Pathology Result     Chlamydia/GC, PCR    Collection Time: 01/25/24  3:19 PM    Specimen: Endocervical; Genital   Result Value Ref Range    Chlamydia trachomatis PCR Not Detected Not Detected    N. gonorrhea PCR Not Detected Not Detected    Source Endocervical    Wet Prep, Genital    Collection Time: 01/25/24  3:19 PM    Specimen: Cervicovaginal; Genital   Result Value Ref Range    WBC, Wet Prep Rare (A) None Seen    Clue Cells, Wet Prep Rare (A) None Seen    Trichomonas, Wet Prep None Seen None Seen    Yeast, Wet Prep None Seen None Seen   POCT urinalysis, dipstick or tablet reag    Collection Time: 01/25/24  3:36 PM   Result Value Ref Range    Color, UA Yellow     Spec Grav UA 1.005     pH, UA 6.0     WBC, UA Negative     Nitrite, UA Negative     Protein, POC Negative     Glucose, UA Negative     Ketones, UA Negative     Urobilinogen, UA 0.2     Bilirubin, POC Negative     Blood, UA Negative    SYPHILIS ANTIBODY (WITH REFLEX RPR)    Collection Time: 01/29/24  2:46 PM   Result Value Ref Range    Syphilis Antibody Nonreactive Nonreactive, Equivocal   HIV 1/2 Ag/Ab (4th Gen)    Collection Time: 01/29/24  2:46 PM   Result Value Ref Range    HIV Nonreactive Nonreactive   Hepatitis B Surface Antigen    Collection Time: 01/29/24  2:46 PM   Result Value Ref Range    Hepatitis B Surface Antigen Nonreactive Nonreactive   Hepatitis C Antibody    Collection Time: 01/29/24  2:46 PM   Result Value Ref Range    Hep C Ab Interp Reactive (A) Nonreactive   HIV 1/2 Ag/Ab (4th Gen)    Collection Time: 01/30/24 11:15 AM   Result Value Ref Range    HIV Nonreactive Nonreactive   Hepatitis C Antibody    Collection Time: 01/30/24 11:15 AM   Result Value Ref Range    Hep C Ab Interp Reactive (A)  Nonreactive   Hepatitis C Virus Quantitative    Collection Time: 01/30/24 11:55 AM   Result Value Ref Range    HCV RNA Detect/Quant Undetected Undetected IU/mL         Follow Up:  Follow up in about 6 weeks (around 3/29/2024) for anxiety.     [unfilled]    This note was created with the assistance of a voice recognition software or phone dictation. There may be transcription errors as a result of using this technology however minimal. Effort has been made to assure accuracy of transcription but any obvious errors or omissions should be clarified with the author of the document

## 2024-03-19 DIAGNOSIS — M54.50 LOW BACK PAIN: Primary | ICD-10-CM

## 2024-03-20 ENCOUNTER — HOSPITAL ENCOUNTER (OUTPATIENT)
Dept: RADIOLOGY | Facility: HOSPITAL | Age: 54
Discharge: HOME | End: 2024-03-20
Attending: PHYSICAL MEDICINE & REHABILITATION
Payer: MEDICAID

## 2024-03-20 DIAGNOSIS — M54.50 LOW BACK PAIN: ICD-10-CM

## 2024-03-20 PROCEDURE — 72148 MRI LUMBAR SPINE W/O DYE: CPT | Mod: TC

## 2024-04-01 ENCOUNTER — OFFICE VISIT (OUTPATIENT)
Dept: FAMILY MEDICINE | Facility: CLINIC | Age: 54
End: 2024-04-01
Payer: MEDICAID

## 2024-04-01 VITALS
HEART RATE: 78 BPM | TEMPERATURE: 98 F | HEIGHT: 69 IN | RESPIRATION RATE: 16 BRPM | SYSTOLIC BLOOD PRESSURE: 137 MMHG | WEIGHT: 164 LBS | BODY MASS INDEX: 24.29 KG/M2 | OXYGEN SATURATION: 100 % | DIASTOLIC BLOOD PRESSURE: 84 MMHG

## 2024-04-01 DIAGNOSIS — F41.1 GAD (GENERALIZED ANXIETY DISORDER): Primary | ICD-10-CM

## 2024-04-01 DIAGNOSIS — Z23 IMMUNIZATION DUE: ICD-10-CM

## 2024-04-01 DIAGNOSIS — M54.12 CERVICAL RADICULOPATHY: ICD-10-CM

## 2024-04-01 DIAGNOSIS — E78.5 HYPERLIPIDEMIA, UNSPECIFIED HYPERLIPIDEMIA TYPE: ICD-10-CM

## 2024-04-01 DIAGNOSIS — J30.2 SEASONAL ALLERGIC RHINITIS, UNSPECIFIED TRIGGER: ICD-10-CM

## 2024-04-01 PROCEDURE — 90715 TDAP VACCINE 7 YRS/> IM: CPT | Mod: PBBFAC

## 2024-04-01 PROCEDURE — 1159F MED LIST DOCD IN RCRD: CPT | Mod: CPTII,,, | Performed by: FAMILY MEDICINE

## 2024-04-01 PROCEDURE — 3008F BODY MASS INDEX DOCD: CPT | Mod: CPTII,,, | Performed by: FAMILY MEDICINE

## 2024-04-01 PROCEDURE — 90471 IMMUNIZATION ADMIN: CPT | Mod: PBBFAC

## 2024-04-01 PROCEDURE — 99214 OFFICE O/P EST MOD 30 MIN: CPT | Mod: PBBFAC,25 | Performed by: FAMILY MEDICINE

## 2024-04-01 PROCEDURE — 3079F DIAST BP 80-89 MM HG: CPT | Mod: CPTII,,, | Performed by: FAMILY MEDICINE

## 2024-04-01 PROCEDURE — 90677 PCV20 VACCINE IM: CPT | Mod: PBBFAC

## 2024-04-01 PROCEDURE — 3075F SYST BP GE 130 - 139MM HG: CPT | Mod: CPTII,,, | Performed by: FAMILY MEDICINE

## 2024-04-01 PROCEDURE — 1160F RVW MEDS BY RX/DR IN RCRD: CPT | Mod: CPTII,,, | Performed by: FAMILY MEDICINE

## 2024-04-01 PROCEDURE — 99214 OFFICE O/P EST MOD 30 MIN: CPT | Mod: S$PBB,,, | Performed by: FAMILY MEDICINE

## 2024-04-01 PROCEDURE — 90472 IMMUNIZATION ADMIN EACH ADD: CPT | Mod: PBBFAC

## 2024-04-01 RX ORDER — TIZANIDINE 4 MG/1
4 TABLET ORAL EVERY 8 HOURS PRN
Qty: 60 TABLET | Refills: 0 | Status: SHIPPED | OUTPATIENT
Start: 2024-04-01

## 2024-04-01 RX ORDER — LEVOCETIRIZINE DIHYDROCHLORIDE 5 MG/1
5 TABLET, FILM COATED ORAL NIGHTLY
Qty: 90 TABLET | Refills: 1 | Status: SHIPPED | OUTPATIENT
Start: 2024-04-01

## 2024-04-01 RX ORDER — ATORVASTATIN CALCIUM 10 MG/1
10 TABLET, FILM COATED ORAL DAILY
Qty: 90 TABLET | Refills: 1 | Status: SHIPPED | OUTPATIENT
Start: 2024-04-01

## 2024-04-01 RX ORDER — ESCITALOPRAM OXALATE 10 MG/1
10 TABLET ORAL DAILY
Qty: 90 TABLET | Refills: 1 | Status: SHIPPED | OUTPATIENT
Start: 2024-04-01 | End: 2024-06-04 | Stop reason: SDUPTHER

## 2024-04-01 RX ADMIN — PNEUMOCOCCAL 20-VALENT CONJUGATE VACCINE 0.5 ML
2.2; 2.2; 2.2; 2.2; 2.2; 2.2; 2.2; 2.2; 2.2; 2.2; 2.2; 2.2; 2.2; 2.2; 2.2; 2.2; 4.4; 2.2; 2.2; 2.2 INJECTION, SUSPENSION INTRAMUSCULAR at 11:04

## 2024-04-01 RX ADMIN — TETANUS TOXOID, REDUCED DIPHTHERIA TOXOID AND ACELLULAR PERTUSSIS VACCINE, ADSORBED 0.5 ML: 5; 2.5; 8; 8; 2.5 SUSPENSION INTRAMUSCULAR at 11:04

## 2024-04-01 NOTE — PROGRESS NOTES
Patient Name: Galina Walker   : 1970  MRN: [id number]     SUBJECTIVE:  Galina Walker is a 53 y.o. female here for Follow-up (The patient states that she feels sticking sensation in her legs and feet.)  .    HPI  Here for close follow up of anxiety. Last visit started pt on lexapro. She is also on elavil for insomnia. Has an appt next month with psychiatry.   Has not been taking the elavil though, just the lexapro and sleeping well. Stable anxiety. Used to be on cymbalta in the past and did like it.  Labs with normalized creatinine. Lipid panel improved but cholesterol remains elevated.  The 10-year ASCVD risk score (Delgado WELCH, et al., 2019) is: 5.2%    Values used to calculate the score:      Age: 53 years      Sex: Female      Is Non- : Yes      Diabetic: No      Tobacco smoker: Yes      Systolic Blood Pressure: 137 mmHg      Is BP treated: No      HDL Cholesterol: 68 mg/dL      Total Cholesterol: 227 mg/dL      Follows with Neurosurgery/pain management for chronic neck and back pain.  Just had a recent MRI of the back.  Sometimes tingling sensation in her legs and feet.  On Lyrica from them. They went up to 150 mg TID just recently.     ALLERGIES:   Review of patient's allergies indicates:   Allergen Reactions    Ibuprofen Nausea And Vomiting and Other (See Comments)     Cox pt's stomach    Ibuprofen Nausea Only         ROS:  Review of Systems   Constitutional:  Negative for chills, fever and weight loss.   HENT:  Negative for congestion.    Eyes:  Negative for blurred vision.   Respiratory:  Negative for cough and shortness of breath.    Cardiovascular:  Negative for chest pain, palpitations and leg swelling.   Gastrointestinal:  Negative for abdominal pain, blood in stool, diarrhea, nausea and vomiting.   Genitourinary:  Negative for dysuria and hematuria.   Musculoskeletal:  Positive for back pain and neck pain.   Neurological:  Negative for dizziness and headaches.  "  Psychiatric/Behavioral:  Negative for depression. The patient is not nervous/anxious.          OBJECTIVE:  Vital signs  Vitals:    04/01/24 1034   BP: 137/84   Pulse: 78   Resp: 16   Temp: 98 °F (36.7 °C)   TempSrc: Oral   SpO2: 100%   Weight: 74.4 kg (164 lb)   Height: 5' 9" (1.753 m)      Body mass index is 24.22 kg/m².    PHYSICAL EXAM:   Physical Exam  Vitals reviewed.   Constitutional:       General: She is not in acute distress.     Appearance: Normal appearance. She is not ill-appearing.   HENT:      Head: Normocephalic and atraumatic.      Right Ear: External ear normal.      Left Ear: External ear normal.      Nose: Nose normal. No rhinorrhea.      Mouth/Throat:      Mouth: Mucous membranes are moist.   Eyes:      General: No scleral icterus.        Right eye: No discharge.         Left eye: No discharge.      Conjunctiva/sclera: Conjunctivae normal.      Pupils: Pupils are equal, round, and reactive to light.   Cardiovascular:      Rate and Rhythm: Normal rate and regular rhythm.   Pulmonary:      Effort: Pulmonary effort is normal. No respiratory distress.      Breath sounds: No wheezing, rhonchi or rales.   Abdominal:      General: Bowel sounds are normal. There is no distension.      Palpations: Abdomen is soft.      Tenderness: There is no abdominal tenderness.   Musculoskeletal:      Cervical back: Normal range of motion and neck supple. No rigidity or tenderness.      Right lower leg: No edema.      Left lower leg: No edema.   Skin:     General: Skin is warm.      Coloration: Skin is not pale.      Findings: No rash.   Neurological:      General: No focal deficit present.      Mental Status: She is alert and oriented to person, place, and time.   Psychiatric:         Mood and Affect: Mood normal.         Behavior: Behavior normal.          ASSESSMENT/PLAN:  1. RODNEY (generalized anxiety disorder)  Well-controlled.  Continue with Lexapro 10 mg since mood has been stable on it, but did discuss the " option of Cymbalta.  Patient will discuss 1st other options with pain management and might consider switching to Cymbalta next visit.  No issues with sleeping anymore and has not been taking Elavil.  Okay to discontinue Elavil.  -     EScitalopram oxalate (LEXAPRO) 10 MG tablet; Take 1 tablet (10 mg total) by mouth once daily.  Dispense: 90 tablet; Refill: 1    2. Hyperlipidemia, unspecified hyperlipidemia type  Patient would like to start a medication in addition to watching diet.  Start Lipitor 10 mg daily.   -     atorvastatin (LIPITOR) 10 MG tablet; Take 1 tablet (10 mg total) by mouth once daily.  Dispense: 90 tablet; Refill: 1    3. Cervical radiculopathy  Continue to follow up with Neurosurgery and pain management.  Patient on Norco and Lyrica from them.  Would ally to have some pills pf muscle relaxants available, used to be on them in the past and will discuss this with her doctors when she has the next appointment with them.  -     tiZANidine (ZANAFLEX) 4 MG tablet; Take 1 tablet (4 mg total) by mouth every 8 (eight) hours as needed (spasm).  Dispense: 60 tablet; Refill: 0    4. Immunization due  -     pneumoc 20-harvey conj-dip cr(PF) (PREVNAR-20 (PF)) injection Syrg 0.5 mL  -     Tdap (BOOSTRIX) vaccine injection 0.5 mL    5. Seasonal allergic rhinitis, unspecified trigger  -     levocetirizine (XYZAL) 5 MG tablet; Take 1 tablet (5 mg total) by mouth every evening.  Dispense: 90 tablet; Refill: 1             Previous medical history/lab work/radiology reviewed and considered during medical management decisions.   Medication list reviewed and medication reconciliation performed.  Patient was provided  and care about his/her current diagnosis (es) and medications including risk/benefit and side effects/adverse events, over the counter medication uses/doses, home self-care and contact precautions,  and red flags and indications for when to seek immediate medical attention.   Patient was advised to  continue compliance with current medication list and medical recommendations.  Recommended/ Advised continued compliance with recommended eating habits/ diets for medical conditions and exercise 150 minutes/ week (if possible) for medical condition (s).        RESULTS:  Recent Results (from the past 1008 hour(s))   Basic Metabolic Panel    Collection Time: 03/20/24  9:10 AM   Result Value Ref Range    Sodium Level 143 136 - 145 mmol/L    Potassium Level 4.3 3.5 - 5.1 mmol/L    Chloride 108 (H) 98 - 107 mmol/L    Carbon Dioxide 27 22 - 29 mmol/L    Glucose Level 108 (H) 74 - 100 mg/dL    Blood Urea Nitrogen 10.1 9.8 - 20.1 mg/dL    Creatinine 0.73 0.55 - 1.02 mg/dL    BUN/Creatinine Ratio 14     Calcium Level Total 9.6 8.4 - 10.2 mg/dL    Anion Gap 8.0 mEq/L    eGFR >60 mls/min/1.73/m2   TSH    Collection Time: 03/20/24  9:10 AM   Result Value Ref Range    TSH 0.862 0.350 - 4.940 uIU/mL   Lipid Panel    Collection Time: 03/20/24  9:10 AM   Result Value Ref Range    Cholesterol Total 227 (H) <=200 mg/dL    HDL Cholesterol 68 (H) 35 - 60 mg/dL    Triglyceride 94 37 - 140 mg/dL    Cholesterol/HDL Ratio 3 0 - 5    Very Low Density Lipoprotein 19     LDL Cholesterol 140.00 50.00 - 140.00 mg/dL         Follow Up:  Follow up in about 6 months (around 10/1/2024).     [unfilled]    This note was created with the assistance of a voice recognition software or phone dictation. There may be transcription errors as a result of using this technology however minimal. Effort has been made to assure accuracy of transcription but any obvious errors or omissions should be clarified with the author of the document

## 2024-05-07 ENCOUNTER — OFFICE VISIT (OUTPATIENT)
Dept: UROLOGY | Facility: CLINIC | Age: 54
End: 2024-05-07
Payer: MEDICAID

## 2024-05-07 VITALS
DIASTOLIC BLOOD PRESSURE: 77 MMHG | WEIGHT: 165.63 LBS | HEART RATE: 68 BPM | OXYGEN SATURATION: 100 % | RESPIRATION RATE: 20 BRPM | BODY MASS INDEX: 24.53 KG/M2 | SYSTOLIC BLOOD PRESSURE: 117 MMHG | TEMPERATURE: 98 F | HEIGHT: 69 IN

## 2024-05-07 DIAGNOSIS — R35.1 NOCTURIA: Primary | ICD-10-CM

## 2024-05-07 DIAGNOSIS — N39.46 MIXED STRESS AND URGE URINARY INCONTINENCE: ICD-10-CM

## 2024-05-07 LAB — POC RESIDUAL URINE VOLUME: 15 ML (ref 0–100)

## 2024-05-07 PROCEDURE — 51798 US URINE CAPACITY MEASURE: CPT | Mod: PBBFAC | Performed by: NURSE PRACTITIONER

## 2024-05-07 PROCEDURE — 3008F BODY MASS INDEX DOCD: CPT | Mod: CPTII,,, | Performed by: NURSE PRACTITIONER

## 2024-05-07 PROCEDURE — 99213 OFFICE O/P EST LOW 20 MIN: CPT | Mod: S$PBB,,, | Performed by: NURSE PRACTITIONER

## 2024-05-07 PROCEDURE — 1159F MED LIST DOCD IN RCRD: CPT | Mod: CPTII,,, | Performed by: NURSE PRACTITIONER

## 2024-05-07 PROCEDURE — 3074F SYST BP LT 130 MM HG: CPT | Mod: CPTII,,, | Performed by: NURSE PRACTITIONER

## 2024-05-07 PROCEDURE — 99215 OFFICE O/P EST HI 40 MIN: CPT | Mod: PBBFAC,25 | Performed by: NURSE PRACTITIONER

## 2024-05-07 PROCEDURE — 3078F DIAST BP <80 MM HG: CPT | Mod: CPTII,,, | Performed by: NURSE PRACTITIONER

## 2024-05-07 RX ORDER — MIRABEGRON 25 MG/1
25 TABLET, FILM COATED, EXTENDED RELEASE ORAL DAILY
Qty: 30 TABLET | Refills: 11 | Status: SHIPPED | OUTPATIENT
Start: 2024-05-07 | End: 2025-05-07

## 2024-05-07 NOTE — PROGRESS NOTES
Placed in room. Seen by Adan Rivera NP.  Spoke with patient. Bladder scan at 15 ml. RTC in 1 month.

## 2024-05-07 NOTE — PROGRESS NOTES
Chief Complaint:   Chief Complaint   Patient presents with    urge incontinence     Urinates 6-7 times at night.       HPI:  Patient is a 53-year-old female referred to Urology for nocturia.  Patient seen by PCP for recurrent nocturia and urinary frequency 6-7 times per night.  Patient admits to drinking over 2 posterior coffee per day and does not subside and drinking 1 hour prior to bedtime however she does void before bedtime and avoid drinking any fluids at night.  Today patient denies any dysuria, urinary urgency, in incontinence, urinary retention, gross hematuria.  Bladder scan 15 mL.  Plan is to abstain from caffeine intake in copious amounts we will send patient a prescription for mirabegron 25 mg p.o. daily, instructed patient to cease from drinking copious amounts of caffeine x2 weeks and if symptoms still persisting may start mirabegron RTC one-month for follow-up.    Allergies:  Review of patient's allergies indicates:   Allergen Reactions    Ibuprofen Nausea And Vomiting and Other (See Comments)     Cox pt's stomach    Ibuprofen Nausea Only       Medications:  Current Outpatient Medications   Medication Sig Dispense Refill    conjugated estrogens (PREMARIN) vaginal cream Insert 0.5g vaginally every night for 2 weeks then twice per week 1 applicator 3    EScitalopram oxalate (LEXAPRO) 10 MG tablet Take 1 tablet (10 mg total) by mouth once daily. 90 tablet 1    HYDROcodone-acetaminophen (NORCO) 5-325 mg per tablet Take 1 tablet by mouth every 6 (six) hours as needed for Pain. 12 tablet 0    hydrOXYzine HCL (ATARAX) 25 MG tablet Take 1 tablet (25 mg total) by mouth 3 (three) times daily as needed for Anxiety. 90 tablet 1    levocetirizine (XYZAL) 5 MG tablet Take 1 tablet (5 mg total) by mouth every evening. 90 tablet 1    pregabalin (LYRICA) 100 MG capsule Take 150 mg by mouth 3 (three) times daily.      tiZANidine (ZANAFLEX) 4 MG tablet Take 1 tablet (4 mg total) by mouth every 8 (eight) hours as  needed (spasm). 60 tablet 0    valACYclovir (VALTREX) 1000 MG tablet Take 2 tabs every 12 hours for 1 day      atorvastatin (LIPITOR) 10 MG tablet Take 1 tablet (10 mg total) by mouth once daily. (Patient not taking: Reported on 2024) 90 tablet 1    atropine 1% (ISOPTO ATROPINE) 1 % Drop Place 1 drop into the left eye 2 (two) times a day. (Patient not taking: Reported on 2024) 5 mL 0    azelastine (ASTELIN) 137 mcg (0.1 %) nasal spray USE 1 SPRAY IN EACH NOSTRIL TWICE DAILY (Patient not taking: Reported on 2024) 90 mL 0     No current facility-administered medications for this visit.       Review of Systems:  General: No fever, chills, fatigability, or weight loss.  Skin: No rashes, itching, or changes in color or texture of skin.  Chest: Denies ANDERSEN, cyanosis, wheezing, cough, and sputum production.  Abdomen: Appetite fine. No weight loss. Denies diarrhea, abdominal pain, hematemesis, or blood in stool.  Musculoskeletal: No joint stiffness or swelling. Denies back pain.  : As above.  All other review of systems negative.    PMH:  Past Medical History:   Diagnosis Date    C5-C7 ACDF 2023    Cervical stenosis of spinal canal 2023    Pituitary mass 2022       PSH:  Past Surgical History:   Procedure Laterality Date     SECTION  2006    COLECTOMY  2017    COLONOSCOPY  2022    COLONOSCOPY  2017    [facility], Rony Majano MD    NECK SURGERY  2019    SPINE SURGERY  2023    C5-C7 ACDF    TUBAL LIGATION  2006       FamHx:  Family History   Problem Relation Name Age of Onset    Ovarian cancer Mother Galina Magallanes 48    Arthritis Father Galina Magallanes     Colon cancer Father Galina Magallanes         age 40       SocHx:  Social History     Socioeconomic History    Marital status: Single   Tobacco Use    Smoking status: Some Days     Current packs/day: 0.50     Average packs/day: 0.5 packs/day for 15.0 years (7.5 ttl pk-yrs)     Types: Cigarettes, Vaping  "with nicotine     Passive exposure: Current    Smokeless tobacco: Current    Tobacco comments:     4/4/2023- Pt states no longer smokes; now vapes with nicotine   Substance and Sexual Activity    Alcohol use: Yes    Drug use: Not Currently     Frequency: 2.0 times per week     Types: Marijuana    Sexual activity: Yes     Partners: Male     Birth control/protection: None   Social History Narrative    ** Merged History Encounter **            Physical Exam:  Vitals:    05/07/24 0949   BP: 117/77   Pulse: 68   Resp: 20   Temp: 98.2 °F (36.8 °C)     General: A&Ox3, no apparent distress, no deformities  Neck: No masses, normal thyroid  Lungs: CTA santa, no use of accessory muscles  Heart: RRR, no arrhythmias  Abdomen: Soft, NT, ND, no masses, no hernias, no hepatosplenomegaly  Lymphatic: Neck and groin nodes negative  Skin: The skin is warm and dry. No jaundice.  Ext: No c/c/e.    GUMale: Test desc santa, no abnormalities of epididymus. Penis, with normal penile and scrotal skin. Meatus normal. Normal rectal tone, no hemorrhoids. Prostate: finger breadth wide, smooth, soft, nontender, no nodules or masses appreciated. SV not palpable. Perineum and anus normal.    GUFemale: External genitalia normal. No lesions. Meatus normal size and location. Urethra normal. No masses. Bladder normal. No fullness or masses. Vagina normal with discharge or lesions. Anus/perineum normal. Uterus and adnexa no palpable abnormalities.    Labs:     No results for input(s): "PSA" in the last 760 hours.   No results for input(s): "TESTOSTERONE" in the last 760 hours.   No results for input(s): "CREATININE" in the last 760 hours.   No results for input(s): "CBC" in the last 760 hours.   No results for input(s): "FSH" in the last 760 hours.   No results for input(s): "LH" in the last 760 hours.   No results for input(s): "PROLACTIN" in the last 760 hours.   Invalid input(s): "URINE CULTURE"  No results for input(s): "ESTRADIOL" in the last 760 " hours.    Urinalysis:  Results for orders placed or performed in visit on 01/25/24   POCT urinalysis, dipstick or tablet reag   Result Value Ref Range    Color, UA Yellow     Spec Grav UA 1.005     pH, UA 6.0     WBC, UA Negative     Nitrite, UA Negative     Protein, POC Negative     Glucose, UA Negative     Ketones, UA Negative     Urobilinogen, UA 0.2     Bilirubin, POC Negative     Blood, UA Negative          Imaging:       Impression:  1. Mixed stress and urge urinary incontinence  - Ambulatory referral/consult to Urology  - POCT Bladder Scan      Plan:  Instructed patient to abstain from caffeine intake in copious amounts we will send patient a prescription for mirabegron 25 mg p.o. daily, instructed patient to cease from drinking copious amounts of caffeine x2 weeks and if symptoms still persisting may start mirabegron RTC one-month for follow-up. Instructed patient avoidance of bladder irritants such as alcohol, citrus foods, chocolate, caffeinated drinks, energy drinks, spicy foods, sugar, caffeine products, sodas. Instructed patient to avoid drinking fluids 1-2 hours prior to bedtime & void immediately before bedtime.

## 2024-05-09 ENCOUNTER — HOSPITAL ENCOUNTER (EMERGENCY)
Facility: HOSPITAL | Age: 54
Discharge: HOME | End: 2024-05-09
Attending: SPECIALIST
Payer: MEDICAID

## 2024-05-09 VITALS
HEIGHT: 69 IN | DIASTOLIC BLOOD PRESSURE: 86 MMHG | TEMPERATURE: 98 F | WEIGHT: 165 LBS | HEART RATE: 83 BPM | RESPIRATION RATE: 18 BRPM | BODY MASS INDEX: 24.44 KG/M2 | OXYGEN SATURATION: 100 % | SYSTOLIC BLOOD PRESSURE: 153 MMHG

## 2024-05-09 DIAGNOSIS — G89.29 CHRONIC LEFT SHOULDER PAIN: Primary | ICD-10-CM

## 2024-05-09 DIAGNOSIS — M25.512 CHRONIC LEFT SHOULDER PAIN: Primary | ICD-10-CM

## 2024-05-09 DIAGNOSIS — M54.12 CERVICAL RADICULOPATHY: ICD-10-CM

## 2024-05-09 PROCEDURE — 96372 THER/PROPH/DIAG INJ SC/IM: CPT | Performed by: SPECIALIST

## 2024-05-09 PROCEDURE — 99284 EMERGENCY DEPT VISIT MOD MDM: CPT | Mod: 25

## 2024-05-09 PROCEDURE — 63600175 PHARM REV CODE 636 W HCPCS: Performed by: SPECIALIST

## 2024-05-09 RX ORDER — KETOROLAC TROMETHAMINE 30 MG/ML
60 INJECTION, SOLUTION INTRAMUSCULAR; INTRAVENOUS
Status: COMPLETED | OUTPATIENT
Start: 2024-05-09 | End: 2024-05-09

## 2024-05-09 RX ADMIN — KETOROLAC TROMETHAMINE 60 MG: 30 INJECTION, SOLUTION INTRAMUSCULAR at 01:05

## 2024-05-09 NOTE — ED PROVIDER NOTES
Encounter Date: 2024       History     Chief Complaint   Patient presents with    Shoulder Pain     Pt into ED with complaints of shoulder pain. Pt states pain feels like a tearing sensation. Had neck surgery a year ago and has been having trouble with shoulder ever since.     Patient with chronic neck pain and chronic left shoulder pain, sees a neurologist receives monthly hydrocodone prescription and Lyrica; she prints in the emergency room and reports continued shoulder pain and states it feels like it is tearing now and she is unable to sleep    The history is provided by the patient.     Review of patient's allergies indicates:   Allergen Reactions    Ibuprofen Nausea And Vomiting and Other (See Comments)     Cox pt's stomach    Ibuprofen Nausea Only     Past Medical History:   Diagnosis Date    C5-C7 ACDF 2023    Cervical stenosis of spinal canal 2023    Pituitary mass 2022     Past Surgical History:   Procedure Laterality Date     SECTION  2006    COLECTOMY  2017    COLONOSCOPY  2022    COLONOSCOPY  2017    [facility], Rony Majano MD    NECK SURGERY  2019    SPINE SURGERY  2023    C5-C7 ACDF    TUBAL LIGATION  2006     Family History   Problem Relation Name Age of Onset    Ovarian cancer Mother Galina Magallanes 48    Arthritis Father Galina Magallanes     Colon cancer Father Galina Magallanes         age 40     Social History     Tobacco Use    Smoking status: Some Days     Current packs/day: 0.50     Average packs/day: 0.5 packs/day for 15.0 years (7.5 ttl pk-yrs)     Types: Cigarettes, Vaping with nicotine     Passive exposure: Current    Smokeless tobacco: Current    Tobacco comments:     2023- Pt states no longer smokes; now vapes with nicotine   Substance Use Topics    Alcohol use: Yes    Drug use: Not Currently     Frequency: 2.0 times per week     Types: Marijuana     Review of Systems   Constitutional: Negative.    HENT: Negative.      Respiratory: Negative.     Cardiovascular: Negative.    Gastrointestinal: Negative.    Musculoskeletal:  Positive for arthralgias, back pain, myalgias, neck pain and neck stiffness.   Skin: Negative.    Neurological: Negative.    Psychiatric/Behavioral:  Positive for sleep disturbance.    All other systems reviewed and are negative.      Physical Exam     Initial Vitals [05/09/24 0037]   BP Pulse Resp Temp SpO2   (!) 153/86 83 18 98.1 °F (36.7 °C) 100 %      MAP       --         Physical Exam    Nursing note and vitals reviewed.  Constitutional: She appears well-developed and well-nourished.   HENT:   Head: Normocephalic and atraumatic.   Eyes: EOM are normal. Pupils are equal, round, and reactive to light.   Neck: Neck supple.   Normal range of motion.  Cardiovascular:  Normal rate, regular rhythm, normal heart sounds and intact distal pulses.           Pulmonary/Chest: Breath sounds normal.   Abdominal: Abdomen is soft.   Musculoskeletal:         General: Normal range of motion.      Cervical back: Normal range of motion and neck supple.      Comments: Left trapezius tenderness to palpation, left paraspinous musculature tender     Neurological: She is alert and oriented to person, place, and time. She has normal strength.   Skin: Skin is warm and dry.         ED Course   Procedures  Labs Reviewed - No data to display       Imaging Results    None          Medications   ketorolac injection 60 mg (60 mg Intramuscular Given 5/9/24 0144)     Medical Decision Making  Patient with chronic neck pain and chronic left shoulder pain, sees a neurologist receives monthly hydrocodone prescription and Lyrica; she prints in the emergency room and reports continued shoulder pain and states it feels like it is tearing now and she is unable to sleep    DIFFERENTIAL DIAGNOSIS- chronic neck pain, cervical radiculopathy, left shoulder pain    Risk  Prescription drug management.  Risk Details: Patient given Toradol 60 mg IM and  encouraged to follow up with her pain management and/or neurologist in the morning                                      Clinical Impression:  Final diagnoses:  [M25.512, G89.29] Chronic left shoulder pain (Primary)  [M54.12] Cervical radiculopathy          ED Disposition Condition    Discharge Stable          ED Prescriptions    None       Follow-up Information       Follow up With Specialties Details Why Contact Info    Neurology  Schedule an appointment as soon as possible for a visit                Colton Srinivasan MD  05/09/24 0147

## 2024-05-09 NOTE — ED NOTES
Left shoulder pain for one year after back surgery. States it's worse tonight. Taking norco but states no relief.

## 2024-06-04 ENCOUNTER — OFFICE VISIT (OUTPATIENT)
Dept: FAMILY MEDICINE | Facility: CLINIC | Age: 54
End: 2024-06-04
Payer: MEDICAID

## 2024-06-04 DIAGNOSIS — F41.1 GAD (GENERALIZED ANXIETY DISORDER): ICD-10-CM

## 2024-06-04 PROCEDURE — 99214 OFFICE O/P EST MOD 30 MIN: CPT | Mod: 95,,, | Performed by: FAMILY MEDICINE

## 2024-06-04 PROCEDURE — 1159F MED LIST DOCD IN RCRD: CPT | Mod: CPTII,95,, | Performed by: FAMILY MEDICINE

## 2024-06-04 PROCEDURE — 1160F RVW MEDS BY RX/DR IN RCRD: CPT | Mod: CPTII,95,, | Performed by: FAMILY MEDICINE

## 2024-06-04 RX ORDER — BUSPIRONE HYDROCHLORIDE 7.5 MG/1
7.5 TABLET ORAL 3 TIMES DAILY
Qty: 90 TABLET | Refills: 2 | Status: SHIPPED | OUTPATIENT
Start: 2024-06-04

## 2024-06-04 RX ORDER — ESCITALOPRAM OXALATE 20 MG/1
20 TABLET ORAL DAILY
Qty: 90 TABLET | Refills: 0 | Status: SHIPPED | OUTPATIENT
Start: 2024-06-04

## 2024-06-04 NOTE — PROGRESS NOTES
Established Patient - Audio Only Telehealth Visit     The patient location is: [place], [place]  The chief complaint leading to consultation is: worsening anxiety  Visit type: Virtual visit with audio only (telephone)  Total time spent with patient: 15 min        The reason for the audio only service rather than synchronous audio and video virtual visit was related to technical difficulties or patient preference/necessity.     Each patient to whom I provide medical services by telemedicine is:  (1) informed of the relationship between the physician and patient and the respective role of any other health care provider with respect to management of the patient; and (2) notified that they may decline to receive medical services by telemedicine and may withdraw from such care at any time. Patient verbally consented to receive this service via voice-only telephone call.       HPI:   Requested virtual visit to discuss worsening anxiety.  Patient was doing well on Lexapro up until lately with increased anxiety and has had few episodes of panic attacks.  Mainly related to her health issues/chronic pain.  Follows with pain clinic and just recently had injection in her back.   Mostly anxiety, no depression except for sometimes low mood.  No suicidal or homicidal ideations.  Not sleeping well.    Of note patient states that she stopped taking Lipitor because it was causing her nosebleeds.  As soon as she stopped taking Lipitor, nosebleeds resolved.  Not willing to retry.     Assessment and plan:    Diagnoses and all orders for this visit:    RODNEY (generalized anxiety disorder)  -     EScitalopram oxalate (LEXAPRO) 20 MG tablet; Take 1 tablet (20 mg total) by mouth once daily.  -     busPIRone (BUSPAR) 7.5 MG tablet; Take 1 tablet (7.5 mg total) by mouth 3 (three) times daily.      Uncontrolled, increase Lexapro to 20 mg daily and add BuSpar.  Close follow up.                        This service was not originating from a related  E/M service provided within the previous 7 days nor will  to an E/M service or procedure within the next 24 hours or my soonest available appointment.  Prevailing standard of care was able to be met in this audio-only visit.

## 2024-07-09 ENCOUNTER — HOSPITAL ENCOUNTER (EMERGENCY)
Facility: HOSPITAL | Age: 54
Discharge: HOME | End: 2024-07-09
Attending: SPECIALIST
Payer: MEDICAID

## 2024-07-09 VITALS
DIASTOLIC BLOOD PRESSURE: 80 MMHG | WEIGHT: 165 LBS | HEART RATE: 85 BPM | OXYGEN SATURATION: 98 % | BODY MASS INDEX: 24.44 KG/M2 | HEIGHT: 69 IN | SYSTOLIC BLOOD PRESSURE: 130 MMHG | RESPIRATION RATE: 20 BRPM | TEMPERATURE: 98 F

## 2024-07-09 DIAGNOSIS — D21.9 FIBROIDS: Primary | ICD-10-CM

## 2024-07-09 LAB
ALBUMIN SERPL-MCNC: 4.3 G/DL (ref 3.5–5)
ALBUMIN/GLOB SERPL: 1.2 RATIO (ref 1.1–2)
ALP SERPL-CCNC: 62 UNIT/L (ref 40–150)
ALT SERPL-CCNC: 18 UNIT/L (ref 0–55)
ANION GAP SERPL CALC-SCNC: 9 MEQ/L
AST SERPL-CCNC: 21 UNIT/L (ref 5–34)
BACTERIA #/AREA URNS AUTO: NORMAL /HPF
BASOPHILS # BLD AUTO: 0.03 X10(3)/MCL
BASOPHILS NFR BLD AUTO: 0.3 %
BILIRUB SERPL-MCNC: 0.3 MG/DL
BILIRUB UR QL STRIP.AUTO: NEGATIVE
BUN SERPL-MCNC: 18.3 MG/DL (ref 9.8–20.1)
CALCIUM SERPL-MCNC: 9.5 MG/DL (ref 8.4–10.2)
CHLORIDE SERPL-SCNC: 106 MMOL/L (ref 98–107)
CLARITY UR: CLEAR
CO2 SERPL-SCNC: 22 MMOL/L (ref 22–29)
COLOR UR AUTO: YELLOW
CREAT SERPL-MCNC: 0.82 MG/DL (ref 0.55–1.02)
CREAT/UREA NIT SERPL: 22
EOSINOPHIL # BLD AUTO: 0.06 X10(3)/MCL (ref 0–0.9)
EOSINOPHIL NFR BLD AUTO: 0.6 %
ERYTHROCYTE [DISTWIDTH] IN BLOOD BY AUTOMATED COUNT: 13 % (ref 11.5–17)
GFR SERPLBLD CREATININE-BSD FMLA CKD-EPI: >60 ML/MIN/1.73/M2
GLOBULIN SER-MCNC: 3.7 GM/DL (ref 2.4–3.5)
GLUCOSE SERPL-MCNC: 73 MG/DL (ref 74–100)
GLUCOSE UR QL STRIP: NEGATIVE
HCT VFR BLD AUTO: 41.4 % (ref 37–47)
HGB BLD-MCNC: 14 G/DL (ref 12–16)
HGB UR QL STRIP: ABNORMAL
IMM GRANULOCYTES # BLD AUTO: 0.11 X10(3)/MCL (ref 0–0.04)
IMM GRANULOCYTES NFR BLD AUTO: 1.1 %
KETONES UR QL STRIP: NEGATIVE
LEUKOCYTE ESTERASE UR QL STRIP: NEGATIVE
LIPASE SERPL-CCNC: 138 U/L
LYMPHOCYTES # BLD AUTO: 2.59 X10(3)/MCL (ref 0.6–4.6)
LYMPHOCYTES NFR BLD AUTO: 26.8 %
MCH RBC QN AUTO: 33.2 PG (ref 27–31)
MCHC RBC AUTO-ENTMCNC: 33.8 G/DL (ref 33–36)
MCV RBC AUTO: 98.1 FL (ref 80–94)
MONOCYTES # BLD AUTO: 0.64 X10(3)/MCL (ref 0.1–1.3)
MONOCYTES NFR BLD AUTO: 6.6 %
NEUTROPHILS # BLD AUTO: 6.24 X10(3)/MCL (ref 2.1–9.2)
NEUTROPHILS NFR BLD AUTO: 64.6 %
NITRITE UR QL STRIP: NEGATIVE
PH UR STRIP: 5.5 [PH]
PLATELET # BLD AUTO: 287 X10(3)/MCL (ref 130–400)
PMV BLD AUTO: 9.9 FL (ref 7.4–10.4)
POTASSIUM SERPL-SCNC: 4.1 MMOL/L (ref 3.5–5.1)
PROT SERPL-MCNC: 8 GM/DL (ref 6.4–8.3)
PROT UR QL STRIP: NEGATIVE
RBC # BLD AUTO: 4.22 X10(6)/MCL (ref 4.2–5.4)
RBC #/AREA URNS AUTO: NORMAL /HPF
SODIUM SERPL-SCNC: 137 MMOL/L (ref 136–145)
SP GR UR STRIP.AUTO: 1.01 (ref 1–1.03)
SQUAMOUS #/AREA URNS AUTO: NORMAL /HPF
UROBILINOGEN UR STRIP-ACNC: 0.2
WBC # BLD AUTO: 9.67 X10(3)/MCL (ref 4.5–11.5)
WBC #/AREA URNS AUTO: NORMAL /HPF

## 2024-07-09 PROCEDURE — 80053 COMPREHEN METABOLIC PANEL: CPT

## 2024-07-09 PROCEDURE — 96372 THER/PROPH/DIAG INJ SC/IM: CPT

## 2024-07-09 PROCEDURE — 81003 URINALYSIS AUTO W/O SCOPE: CPT

## 2024-07-09 PROCEDURE — 83690 ASSAY OF LIPASE: CPT

## 2024-07-09 PROCEDURE — 25000003 PHARM REV CODE 250

## 2024-07-09 PROCEDURE — 63600175 PHARM REV CODE 636 W HCPCS

## 2024-07-09 PROCEDURE — 99284 EMERGENCY DEPT VISIT MOD MDM: CPT | Mod: 25

## 2024-07-09 PROCEDURE — 85025 COMPLETE CBC W/AUTO DIFF WBC: CPT

## 2024-07-09 PROCEDURE — 81001 URINALYSIS AUTO W/SCOPE: CPT

## 2024-07-09 RX ORDER — ONDANSETRON HYDROCHLORIDE 2 MG/ML
4 INJECTION, SOLUTION INTRAVENOUS
Status: COMPLETED | OUTPATIENT
Start: 2024-07-09 | End: 2024-07-09

## 2024-07-09 RX ORDER — HYDROCODONE BITARTRATE AND ACETAMINOPHEN 5; 325 MG/1; MG/1
1 TABLET ORAL
Status: COMPLETED | OUTPATIENT
Start: 2024-07-09 | End: 2024-07-09

## 2024-07-09 RX ORDER — ONDANSETRON HYDROCHLORIDE 2 MG/ML
4 INJECTION, SOLUTION INTRAVENOUS
Status: DISCONTINUED | OUTPATIENT
Start: 2024-07-09 | End: 2024-07-09

## 2024-07-09 RX ORDER — MORPHINE SULFATE 4 MG/ML
4 INJECTION, SOLUTION INTRAMUSCULAR; INTRAVENOUS
Status: DISCONTINUED | OUTPATIENT
Start: 2024-07-09 | End: 2024-07-09

## 2024-07-09 RX ORDER — MORPHINE SULFATE 4 MG/ML
4 INJECTION, SOLUTION INTRAMUSCULAR; INTRAVENOUS
Status: COMPLETED | OUTPATIENT
Start: 2024-07-09 | End: 2024-07-09

## 2024-07-09 RX ADMIN — MORPHINE SULFATE 4 MG: 4 INJECTION INTRAVENOUS at 03:07

## 2024-07-09 RX ADMIN — HYDROCODONE BITARTRATE AND ACETAMINOPHEN 1 TABLET: 5; 325 TABLET ORAL at 06:07

## 2024-07-09 RX ADMIN — ONDANSETRON 4 MG: 2 INJECTION INTRAMUSCULAR; INTRAVENOUS at 03:07

## 2024-07-09 NOTE — ED PROVIDER NOTES
Encounter Date: 2024       History     Chief Complaint   Patient presents with    Abdominal Pain     Pt c/o RLQ pain that started today.  Pt states she has a colon resection/appendecomy in .      Galina, 53-year-old female presents to emergency room with complaints of right lower pelvic pain onset an hour prior to arrival.  Patient with a history fibroids.  Patient has had a tubal ligation.  Denies any dysuria hematuria.  Pt also reports pain with sexual intercourse.  Normal bowel movements.  Afebrile.        Allergies include ibuprofen  Past medical history C5 through C7 acdf,  stenosis, pituitary mass.   Post surgical history includes  colectomy colonoscopy neck surgery spine surgery and tubal ligation  Family history of ovarian cancer arthritis colon cancer    The history is provided by the patient. No  was used.     Review of patient's allergies indicates:   Allergen Reactions    Ibuprofen Nausea And Vomiting and Other (See Comments)     Cox pt's stomach    Ibuprofen Nausea Only     Past Medical History:   Diagnosis Date    C5-C7 ACDF 2023    Cervical stenosis of spinal canal 2023    Pituitary mass 2022     Past Surgical History:   Procedure Laterality Date     SECTION  2006    COLECTOMY  2017    COLONOSCOPY  2022    COLONOSCOPY  2017    [facility], Rony Majano MD    NECK SURGERY  2019    SPINE SURGERY  2023    C5-C7 ACDF    TUBAL LIGATION  2006     Family History   Problem Relation Name Age of Onset    Ovarian cancer Mother Galina Magallanes 48    Arthritis Father Galina Magallanes     Colon cancer Father Galina Magallanes         age 40     Social History     Tobacco Use    Smoking status: Some Days     Current packs/day: 0.50     Average packs/day: 0.5 packs/day for 15.0 years (7.5 ttl pk-yrs)     Types: Cigarettes, Vaping with nicotine     Passive exposure: Current    Smokeless tobacco: Current    Tobacco comments:      4/4/2023- Pt states no longer smokes; now vapes with nicotine   Substance Use Topics    Alcohol use: Yes    Drug use: Not Currently     Frequency: 2.0 times per week     Types: Marijuana     Review of Systems   Constitutional: Negative.    HENT: Negative.     Eyes: Negative.    Respiratory: Negative.     Cardiovascular: Negative.    Gastrointestinal:  Positive for abdominal pain.   Endocrine: Negative.    Genitourinary:  Positive for pelvic pain.   Musculoskeletal: Negative.    Skin: Negative.    Allergic/Immunologic: Negative.    Neurological: Negative.    Hematological: Negative.    Psychiatric/Behavioral: Negative.     All other systems reviewed and are negative.      Physical Exam     Initial Vitals [07/09/24 1422]   BP Pulse Resp Temp SpO2   130/80 85 18 97.8 °F (36.6 °C) 98 %      MAP       --         Physical Exam    Nursing note and vitals reviewed.  Constitutional: She appears well-developed and well-nourished.   HENT:   Head: Normocephalic and atraumatic.   Right Ear: External ear normal.   Left Ear: External ear normal.   Nose: Nose normal.   Mouth/Throat: Oropharynx is clear and moist.   Eyes: Conjunctivae and EOM are normal. Pupils are equal, round, and reactive to light.   Neck: Neck supple.   Normal range of motion.  Cardiovascular:  Normal rate, regular rhythm, normal heart sounds and intact distal pulses.           Pulmonary/Chest: Breath sounds normal.   Abdominal: Abdomen is soft. Bowel sounds are normal. There is abdominal tenderness.   Right lower quadrant pain.     Musculoskeletal:         General: Normal range of motion.      Cervical back: Normal range of motion and neck supple.     Neurological: She is alert and oriented to person, place, and time. She has normal strength and normal reflexes. GCS score is 15. GCS eye subscore is 4. GCS verbal subscore is 5. GCS motor subscore is 6.   Skin: Skin is warm and dry. Capillary refill takes less than 2 seconds.   Psychiatric: She has a normal mood  and affect. Her behavior is normal. Judgment and thought content normal.         ED Course   Procedures  Labs Reviewed   COMPREHENSIVE METABOLIC PANEL - Abnormal; Notable for the following components:       Result Value    Glucose 73 (*)     Globulin 3.7 (*)     All other components within normal limits   LIPASE - Abnormal; Notable for the following components:    Lipase Level 138 (*)     All other components within normal limits   URINALYSIS, REFLEX TO URINE CULTURE - Abnormal; Notable for the following components:    Blood, UA Trace-Intact (*)     All other components within normal limits   CBC WITH DIFFERENTIAL - Abnormal; Notable for the following components:    MCV 98.1 (*)     MCH 33.2 (*)     IG# 0.11 (*)     All other components within normal limits   URINALYSIS, MICROSCOPIC - Normal   CBC W/ AUTO DIFFERENTIAL    Narrative:     The following orders were created for panel order CBC auto differential.  Procedure                               Abnormality         Status                     ---------                               -----------         ------                     CBC with Differential[[id number]]       Abnormal            Final result                 Please view results for these tests on the individual orders.          Imaging Results              CT Chest Abdomen Pelvis Without Contrast (XPD) (Final result)  Result time 07/09/24 17:58:46      Final result by Alisha Tolbert MD (07/09/24 17:58:46)                   Impression:      No acute abnormality of the chest, abdomen or pelvis.      Electronically signed by: Alisha Tolbert  Date:    07/09/2024  Time:    17:58               Narrative:    EXAMINATION:  CT CHEST ABDOMEN PELVIS WITHOUT CONTRAST(XPD)    CLINICAL HISTORY:  pain;    TECHNIQUE:  CT of the chest, abdomen and pelvis without intravenous contrast.    Coronal and sagittal reconstructions were obtained from the axial data set.    Automatic exposure control was utilized to limit  radiation dose.    Radiation Dose:    Total DLP: 1248 mGy*cm    COMPARISON:  None    FINDINGS:  LINES/TUBES: None.    AIRWAYS: Clear centrally.    LUNGS: Clear.    PLEURA: No pleural effusions. No pneumothoraces.    HEART AND MEDIASTINUM: The heart is normal in size.  There is no pericardial effusion    SOFT TISSUES: Normal.    THORACIC BONES: No acute bony pathology.    ABDOMEN/PELVIS:    HEPATOBILIARY: No focal abnormality.    SPLEEN: No focal abnormality.    PANCREAS: Unremarkable.    ADRENALS: No adrenal nodules.    KIDNEYS/URETERS: No hydronephrosis    PELVIC ORGANS/BLADDER: Unremarkable.    PERITONEUM/RETROPERITONEUM: No free intraperitoneal air. No free fluid.    LYMPH NODES: No lymphadenopathy.    VESSELS: Mild scattered vascular calcifications.    GI TRACT: There are postoperative changes of the colon.  There is no bowel obstruction.    ABDOMINOPELVIC BONES AND SOFT TISSUE: Soft tissues within normal limits. No acute bony pathology.                                       US Pelvis Comp with Transvag NON-OB (xpd (Final result)  Result time 07/09/24 16:07:45      Final result by Kendall Moreau MD (07/09/24 16:07:45)                   Impression:      1. Similar appearance of uterine masses most likely leiomyoma.  2. Neither ovary was visualized.  3. No adnexal mass.      Electronically signed by: Kendall Moreau  Date:    07/09/2024  Time:    16:07               Narrative:    EXAMINATION:  US PELVIS COMP WITH TRANSVAG NON-OB (XPD)    CLINICAL HISTORY:  pain;    COMPARISON:  2 February 2024, 2 July 2022    FINDINGS:  Transabdominal and transvaginal scanning of the pelvis with grayscale, color and spectral Doppler.    The uterus it is retroverted measuring about 7 cm in length.  The endometrium measures 2 mm.  There are uterine mass is similar to the prior ultrasound.  The largest measures up to 4 cm.    Neither ovary was visualized.    There is no adnexal mass or significant pelvic free fluid.                                        Medications   morphine injection 4 mg (4 mg Intramuscular Given 7/9/24 1517)   ondansetron injection 4 mg (4 mg Intramuscular Given 7/9/24 1517)   HYDROcodone-acetaminophen 5-325 mg per tablet 1 tablet (1 tablet Oral Given 7/9/24 1826)     Medical Decision Making  Medical decision-making    Galina, 53-year-old female presents to the emergency room with a right lower quadrant/pelvic pain.  Pain radiates to the groin.  History ovarian cyst.  Patient denies any heavy lifting or straining.  Pain was sudden.  No dysuria hematuria.  History of appendectomy and colon resection.    Differential diagnosis:  Ovarian cyst, hernia, ectopic, torsion.     Amount and/or Complexity of Data Reviewed  Labs: ordered.     Details: CBC:  Unremarkable  Chem:  Glucose 73  Lipase:  138  U/A:  Trace blood   Radiology: ordered.     Details: 1. Similar appearance of uterine masses most likely leiomyoma.   2. Neither ovary was visualized.   3. No adnexal mass.       Risk  Prescription drug management.                                      Clinical Impression:  Final diagnoses:  [D21.9] Fibroids (Primary)          ED Disposition Condition    Discharge Stable          ED Prescriptions    None       Follow-up Information    None          Cassi Oquendo NP  07/09/24 5365

## 2024-07-09 NOTE — ED NOTES
Attempted piv sl insertion x2 - unsuccessful -  NP provider updated- orders changed to IM meds now- given and pt to ultrasound.

## 2024-07-09 NOTE — DISCHARGE INSTRUCTIONS
Patient will be discharged home.  Instructed to follow up with the gynecologist tomorrow for further evaluations.  Anti-inflammatories as needed heating pad.

## 2024-07-11 ENCOUNTER — TELEPHONE (OUTPATIENT)
Dept: GYNECOLOGY | Facility: CLINIC | Age: 54
End: 2024-07-11
Payer: MEDICAID

## 2024-07-11 NOTE — TELEPHONE ENCOUNTER
Pt is requesting an appt. She was seen in ER on 7/9/2024; they told pt she has fibroids and needs to be seen ASAP. We are booked for July. Please advise if you want to overbook or if we can schedule pt for August. Thank you.       ----- Message from Britt Calderón sent at 7/11/2024  9:16 AM CDT -----  The above pt called stating she was seen in the ER on 7/9 and they found some fibroids. Pt states they told her to call and get scheduled as soon as possible.Please advise, Thanks!

## 2024-07-12 ENCOUNTER — HOSPITAL ENCOUNTER (EMERGENCY)
Facility: HOSPITAL | Age: 54
Discharge: HOME | End: 2024-07-12
Attending: EMERGENCY MEDICINE
Payer: MEDICAID

## 2024-07-12 VITALS
RESPIRATION RATE: 20 BRPM | HEART RATE: 70 BPM | SYSTOLIC BLOOD PRESSURE: 113 MMHG | HEIGHT: 69 IN | DIASTOLIC BLOOD PRESSURE: 75 MMHG | OXYGEN SATURATION: 100 % | TEMPERATURE: 98 F | WEIGHT: 164.25 LBS | BODY MASS INDEX: 24.33 KG/M2

## 2024-07-12 DIAGNOSIS — D25.9 UTERINE LEIOMYOMA, UNSPECIFIED LOCATION: Primary | ICD-10-CM

## 2024-07-12 LAB
ALBUMIN SERPL-MCNC: 4.2 G/DL (ref 3.5–5)
ALBUMIN/GLOB SERPL: 1.2 RATIO (ref 1.1–2)
ALP SERPL-CCNC: 57 UNIT/L (ref 40–150)
ALT SERPL-CCNC: 21 UNIT/L (ref 0–55)
ANION GAP SERPL CALC-SCNC: 9 MEQ/L
AST SERPL-CCNC: 21 UNIT/L (ref 5–34)
BACTERIA #/AREA URNS AUTO: ABNORMAL /HPF
BASOPHILS # BLD AUTO: 0.02 X10(3)/MCL
BASOPHILS NFR BLD AUTO: 0.3 %
BILIRUB SERPL-MCNC: 0.5 MG/DL
BILIRUB UR QL STRIP.AUTO: NEGATIVE
BUN SERPL-MCNC: 10.8 MG/DL (ref 9.8–20.1)
CALCIUM SERPL-MCNC: 9.7 MG/DL (ref 8.4–10.2)
CHLORIDE SERPL-SCNC: 110 MMOL/L (ref 98–107)
CLARITY UR: CLEAR
CO2 SERPL-SCNC: 21 MMOL/L (ref 22–29)
COLOR UR AUTO: YELLOW
CREAT SERPL-MCNC: 0.82 MG/DL (ref 0.55–1.02)
CREAT/UREA NIT SERPL: 13
EOSINOPHIL # BLD AUTO: 0.03 X10(3)/MCL (ref 0–0.9)
EOSINOPHIL NFR BLD AUTO: 0.4 %
ERYTHROCYTE [DISTWIDTH] IN BLOOD BY AUTOMATED COUNT: 12.8 % (ref 11.5–17)
GFR SERPLBLD CREATININE-BSD FMLA CKD-EPI: >60 ML/MIN/1.73/M2
GLOBULIN SER-MCNC: 3.4 GM/DL (ref 2.4–3.5)
GLUCOSE SERPL-MCNC: 86 MG/DL (ref 74–100)
GLUCOSE UR QL STRIP: NORMAL
HCT VFR BLD AUTO: 41.3 % (ref 37–47)
HGB BLD-MCNC: 13.8 G/DL (ref 12–16)
HGB UR QL STRIP: NEGATIVE
HYALINE CASTS #/AREA URNS LPF: ABNORMAL /LPF
IMM GRANULOCYTES # BLD AUTO: 0.04 X10(3)/MCL (ref 0–0.04)
IMM GRANULOCYTES NFR BLD AUTO: 0.5 %
KETONES UR QL STRIP: ABNORMAL
LEUKOCYTE ESTERASE UR QL STRIP: NEGATIVE
LYMPHOCYTES # BLD AUTO: 1.97 X10(3)/MCL (ref 0.6–4.6)
LYMPHOCYTES NFR BLD AUTO: 26 %
MCH RBC QN AUTO: 32.5 PG (ref 27–31)
MCHC RBC AUTO-ENTMCNC: 33.4 G/DL (ref 33–36)
MCV RBC AUTO: 97.2 FL (ref 80–94)
MONOCYTES # BLD AUTO: 0.35 X10(3)/MCL (ref 0.1–1.3)
MONOCYTES NFR BLD AUTO: 4.6 %
MUCOUS THREADS URNS QL MICRO: ABNORMAL /LPF
NEUTROPHILS # BLD AUTO: 5.18 X10(3)/MCL (ref 2.1–9.2)
NEUTROPHILS NFR BLD AUTO: 68.2 %
NITRITE UR QL STRIP: NEGATIVE
NRBC BLD AUTO-RTO: 0 %
PH UR STRIP: 5.5 [PH]
PLATELET # BLD AUTO: 257 X10(3)/MCL (ref 130–400)
PMV BLD AUTO: 10.1 FL (ref 7.4–10.4)
POTASSIUM SERPL-SCNC: 4.4 MMOL/L (ref 3.5–5.1)
PROT SERPL-MCNC: 7.6 GM/DL (ref 6.4–8.3)
PROT UR QL STRIP: ABNORMAL
RBC # BLD AUTO: 4.25 X10(6)/MCL (ref 4.2–5.4)
RBC #/AREA URNS AUTO: ABNORMAL /HPF
SODIUM SERPL-SCNC: 140 MMOL/L (ref 136–145)
SP GR UR STRIP.AUTO: 1.02 (ref 1–1.03)
SQUAMOUS #/AREA URNS LPF: ABNORMAL /HPF
UROBILINOGEN UR STRIP-ACNC: NORMAL
WBC # BLD AUTO: 7.59 X10(3)/MCL (ref 4.5–11.5)
WBC #/AREA URNS AUTO: ABNORMAL /HPF

## 2024-07-12 PROCEDURE — 96372 THER/PROPH/DIAG INJ SC/IM: CPT | Performed by: NURSE PRACTITIONER

## 2024-07-12 PROCEDURE — 85025 COMPLETE CBC W/AUTO DIFF WBC: CPT | Performed by: NURSE PRACTITIONER

## 2024-07-12 PROCEDURE — 81001 URINALYSIS AUTO W/SCOPE: CPT | Performed by: NURSE PRACTITIONER

## 2024-07-12 PROCEDURE — 99284 EMERGENCY DEPT VISIT MOD MDM: CPT | Mod: 25

## 2024-07-12 PROCEDURE — 63600175 PHARM REV CODE 636 W HCPCS: Performed by: NURSE PRACTITIONER

## 2024-07-12 PROCEDURE — 80053 COMPREHEN METABOLIC PANEL: CPT | Performed by: NURSE PRACTITIONER

## 2024-07-12 RX ORDER — MORPHINE SULFATE 2 MG/ML
4 INJECTION, SOLUTION INTRAMUSCULAR; INTRAVENOUS
Status: COMPLETED | OUTPATIENT
Start: 2024-07-12 | End: 2024-07-12

## 2024-07-12 RX ORDER — GABAPENTIN 300 MG/1
300 CAPSULE ORAL DAILY
Qty: 30 CAPSULE | Refills: 0 | Status: SHIPPED | OUTPATIENT
Start: 2024-07-12 | End: 2024-08-11

## 2024-07-12 RX ORDER — ONDANSETRON HYDROCHLORIDE 2 MG/ML
4 INJECTION, SOLUTION INTRAVENOUS
Status: COMPLETED | OUTPATIENT
Start: 2024-07-12 | End: 2024-07-12

## 2024-07-12 RX ORDER — ACETAMINOPHEN AND CODEINE PHOSPHATE 300; 30 MG/1; MG/1
1 TABLET ORAL EVERY 8 HOURS PRN
Qty: 12 TABLET | Refills: 0 | Status: SHIPPED | OUTPATIENT
Start: 2024-07-12

## 2024-07-12 RX ADMIN — ONDANSETRON 4 MG: 2 INJECTION INTRAMUSCULAR; INTRAVENOUS at 12:07

## 2024-07-12 RX ADMIN — MORPHINE SULFATE 4 MG: 2 INJECTION, SOLUTION INTRAMUSCULAR; INTRAVENOUS at 12:07

## 2024-07-12 NOTE — DISCHARGE INSTRUCTIONS
Follow up with your primary care physician in 3-5 days for follow up evaluation.  Follow up with [facility] as discussed.  Take medication as prescribed.

## 2024-07-12 NOTE — ED PROVIDER NOTES
Encounter Date: 2024       History     Chief Complaint   Patient presents with    Pelvic Pain     PT SEEN AT [facility] ON 24 W DX OF UTERINE FIBROIDS AND TO FU W GYN BUT APT NOT TILL 25.  STATES UNABLE TO TACO PAIN.      Pt is a 53 y.o. female who presents to the [facility] ED complaining of pelvic pain. Hx of uterine fibroids and is currently awaiting her next GYN appointment for issue. Denies vaginal bleeding, vaginal discharge, chest pain, SOB, weakness, dizziness, or fever. Seen for issue at [facility] ED on  for same issue but denies being sent home with medication for pain. Current pain symptoms x 3-4 days.      Review of patient's allergies indicates:   Allergen Reactions    Ibuprofen Nausea And Vomiting and Other (See Comments)     Cox pt's stomach    Ibuprofen Nausea Only     Past Medical History:   Diagnosis Date    C5-C7 ACDF 2023    Cervical stenosis of spinal canal 2023    Pituitary mass 2022     Past Surgical History:   Procedure Laterality Date     SECTION  2006    COLECTOMY  2017    COLONOSCOPY  2022    COLONOSCOPY  2017    [facility], Rony Majano MD    NECK SURGERY  2019    SPINE SURGERY  2023    C5-C7 ACDF    TUBAL LIGATION  2006     Family History   Problem Relation Name Age of Onset    Ovarian cancer Mother Galina Magallanes 48    Arthritis Father Galina Magallanes     Colon cancer Father Galina Magallanes         age 40     Social History     Tobacco Use    Smoking status: Some Days     Current packs/day: 0.50     Average packs/day: 0.5 packs/day for 15.0 years (7.5 ttl pk-yrs)     Types: Cigarettes, Vaping with nicotine     Passive exposure: Current    Smokeless tobacco: Current    Tobacco comments:     2023- Pt states no longer smokes; now vapes with nicotine   Substance Use Topics    Alcohol use: Yes    Drug use: Not Currently     Frequency: 2.0 times per week     Types: Marijuana     Review of Systems   Constitutional:   Negative for chills, diaphoresis, fatigue and fever.   HENT:  Negative for facial swelling, rhinorrhea, sinus pressure, sinus pain, sore throat and trouble swallowing.    Respiratory:  Negative for cough, chest tightness, shortness of breath and wheezing.    Cardiovascular:  Negative for chest pain, palpitations and leg swelling.   Gastrointestinal:  Negative for abdominal pain, diarrhea, nausea and vomiting.   Genitourinary:  Positive for pelvic pain. Negative for dysuria, flank pain, frequency, hematuria, urgency, vaginal bleeding, vaginal discharge and vaginal pain.   Musculoskeletal:  Negative for arthralgias, back pain, joint swelling and myalgias.   Skin:  Negative for color change and rash.   Neurological:  Negative for dizziness, syncope, weakness and light-headedness.   Hematological:  Does not bruise/bleed easily.   All other systems reviewed and are negative.      Physical Exam     Initial Vitals [07/12/24 1157]   BP Pulse Resp Temp SpO2   (!) 147/88 64 16 97.9 °F (36.6 °C) 100 %      MAP       --         Physical Exam    Nursing note and vitals reviewed.  Constitutional: She appears well-developed and well-nourished.   HENT:   Head: Normocephalic and atraumatic.   Nose: Nose normal.   Mouth/Throat: Oropharynx is clear and moist.   Eyes: Conjunctivae and EOM are normal. Pupils are equal, round, and reactive to light.   Neck: Neck supple.   Normal range of motion.  Cardiovascular:  Normal rate, regular rhythm, normal heart sounds and intact distal pulses.           Pulmonary/Chest: Effort normal and breath sounds normal. No respiratory distress. She has no wheezes. She has no rhonchi. She has no rales. She exhibits no tenderness.   Abdominal: Abdomen is soft and flat. Bowel sounds are normal. She exhibits no distension. There is abdominal tenderness in the suprapubic area.     There is no rebound, no guarding, no tenderness at McBurney's point and negative Walker's sign. negative psoas  sign  Musculoskeletal:         General: Normal range of motion.      Cervical back: Normal range of motion and neck supple.     Neurological: She is alert and oriented to person, place, and time. She has normal strength and normal reflexes.   Skin: Skin is warm and dry. Capillary refill takes less than 2 seconds.   Psychiatric: She has a normal mood and affect. Her speech is normal and behavior is normal. Judgment and thought content normal.         ED Course   Procedures  Labs Reviewed   COMPREHENSIVE METABOLIC PANEL - Abnormal; Notable for the following components:       Result Value    Chloride 110 (*)     CO2 21 (*)     All other components within normal limits   URINALYSIS, REFLEX TO URINE CULTURE - Abnormal; Notable for the following components:    Protein, UA Trace (*)     Ketones, UA 1+ (*)     Bacteria, UA Occ (*)     Squamous Epithelial Cells, UA Moderate (*)     Mucous, UA Occ (*)     All other components within normal limits   CBC WITH DIFFERENTIAL - Abnormal; Notable for the following components:    MCV 97.2 (*)     MCH 32.5 (*)     All other components within normal limits   CBC W/ AUTO DIFFERENTIAL    Narrative:     The following orders were created for panel order CBC auto differential.  Procedure                               Abnormality         Status                     ---------                               -----------         ------                     CBC with Differential[[id number]]       Abnormal            Final result                 Please view results for these tests on the individual orders.          Imaging Results    None          Medications   morphine injection 4 mg (4 mg Intramuscular Given 7/12/24 1250)   ondansetron injection 4 mg (4 mg Intramuscular Given 7/12/24 1251)     Medical Decision Making  Differential:  Uterine fibroids  UTI      Amount and/or Complexity of Data Reviewed  Labs: ordered.    Risk  Prescription drug management.               ED Course as of 07/12/24 6606    Fri Jul 12, 2024   1359 Given strict ED return precautions. I have spoken with the patient and/or caregivers. I have explained the patient's condition, diagnoses and treatment plan based on the information available to me at this time. I have answered the patient's and/or caregiver's questions and addressed any concerns. The patient and/or caregivers have as good an understanding of the patient's diagnosis, condition and treatment plan as can be expected at this point. The vital signs have been stable. The patient's condition is stable and appropriate for discharge from the emergency department.      The patient will pursue further outpatient evaluation with the primary care physician or other designated or consulting physician as outlined in the discharge instructions. The patient and/or caregivers are agreeable to this plan of care and follow-up instructions have been explained in detail. The patient and/or caregivers have received these instructions in written format and have expressed an understanding of the discharge instructions. The patient and/or caregivers are aware that any significant change in condition or worsening of symptoms should prompt an immediate return to this or the closest emergency department or a call to 911.   [JA]      ED Course User Index  [JA] Kendall Parks Jr., FNP                           Clinical Impression:  Final diagnoses:  [D25.9] Uterine leiomyoma, unspecified location (Primary)          ED Disposition Condition    Discharge Stable          ED Prescriptions       Medication Sig Dispense Start Date End Date Auth. Provider    gabapentin (NEURONTIN) 300 MG capsule Take 1 capsule (300 mg total) by mouth once daily. 30 capsule 7/12/2024 8/11/2024 Kendall Parks Jr., FNP    acetaminophen-codeine 300-30mg (TYLENOL #3) 300-30 mg Tab Take 1 tablet by mouth every 8 (eight) hours as needed (pain). 12 tablet 7/12/2024 -- Kendall Parks Jr., FNP          Follow-up Information        Follow up With Specialties Details Why Contact Info    Aisha Preston MD Family Medicine In 3 days  [address] [facility] [address]  [phone]               Kendall Parks Jr., [facility]  07/12/24 1964

## 2024-07-19 ENCOUNTER — OFFICE VISIT (OUTPATIENT)
Dept: GYNECOLOGY | Facility: CLINIC | Age: 54
End: 2024-07-19
Payer: MEDICAID

## 2024-07-19 ENCOUNTER — LAB VISIT (OUTPATIENT)
Dept: LAB | Facility: HOSPITAL | Age: 54
End: 2024-07-19
Payer: MEDICAID

## 2024-07-19 VITALS
WEIGHT: 167 LBS | HEART RATE: 76 BPM | TEMPERATURE: 98 F | OXYGEN SATURATION: 100 % | SYSTOLIC BLOOD PRESSURE: 125 MMHG | BODY MASS INDEX: 25.31 KG/M2 | RESPIRATION RATE: 18 BRPM | HEIGHT: 68 IN | DIASTOLIC BLOOD PRESSURE: 88 MMHG

## 2024-07-19 DIAGNOSIS — M48.02 CERVICAL STENOSIS OF SPINAL CANAL: ICD-10-CM

## 2024-07-19 DIAGNOSIS — M54.12 CERVICAL RADICULOPATHY: ICD-10-CM

## 2024-07-19 DIAGNOSIS — Z11.3 ROUTINE SCREENING FOR STI (SEXUALLY TRANSMITTED INFECTION): ICD-10-CM

## 2024-07-19 DIAGNOSIS — R76.8 HEPATITIS C ANTIBODY TEST POSITIVE: ICD-10-CM

## 2024-07-19 DIAGNOSIS — E23.6 PITUITARY MASS: ICD-10-CM

## 2024-07-19 DIAGNOSIS — D25.9 UTERINE LEIOMYOMA, UNSPECIFIED LOCATION: ICD-10-CM

## 2024-07-19 DIAGNOSIS — N85.8 UTERINE MASS: ICD-10-CM

## 2024-07-19 DIAGNOSIS — R30.0 DYSURIA: Primary | ICD-10-CM

## 2024-07-19 DIAGNOSIS — F17.200 NEEDS SMOKING CESSATION EDUCATION: ICD-10-CM

## 2024-07-19 LAB
B-HCG FREE SERPL-ACNC: <2.42 MIU/ML
C TRACH DNA SPEC QL NAA+PROBE: NOT DETECTED
CLUE CELLS VAG QL WET PREP: ABNORMAL
HBV SURFACE AG SERPL QL IA: NONREACTIVE
HIV 1+2 AB+HIV1 P24 AG SERPL QL IA: NONREACTIVE
LDH SERPL-CCNC: 206 U/L (ref 125–220)
N GONORRHOEA DNA SPEC QL NAA+PROBE: NOT DETECTED
SOURCE (OHS): NORMAL
T PALLIDUM AB SER QL: NONREACTIVE
T VAGINALIS VAG QL WET PREP: ABNORMAL
WBC #/AREA VAG WET PREP: ABNORMAL
YEAST SPEC QL WET PREP: ABNORMAL

## 2024-07-19 PROCEDURE — 87340 HEPATITIS B SURFACE AG IA: CPT

## 2024-07-19 PROCEDURE — 87389 HIV-1 AG W/HIV-1&-2 AB AG IA: CPT

## 2024-07-19 PROCEDURE — 36415 COLL VENOUS BLD VENIPUNCTURE: CPT

## 2024-07-19 PROCEDURE — 83615 LACTATE (LD) (LDH) ENZYME: CPT

## 2024-07-19 PROCEDURE — 87522 HEPATITIS C REVRS TRNSCRPJ: CPT

## 2024-07-19 PROCEDURE — 86780 TREPONEMA PALLIDUM: CPT

## 2024-07-19 PROCEDURE — 87210 SMEAR WET MOUNT SALINE/INK: CPT

## 2024-07-19 PROCEDURE — 87491 CHLMYD TRACH DNA AMP PROBE: CPT

## 2024-07-19 PROCEDURE — 84702 CHORIONIC GONADOTROPIN TEST: CPT

## 2024-07-19 PROCEDURE — 99215 OFFICE O/P EST HI 40 MIN: CPT | Mod: PBBFAC

## 2024-07-19 RX ORDER — ONDANSETRON 8 MG/1
8 TABLET, ORALLY DISINTEGRATING ORAL EVERY 8 HOURS PRN
COMMUNITY
Start: 2024-06-29

## 2024-07-19 RX ORDER — HYDROCODONE BITARTRATE AND ACETAMINOPHEN 5; 325 MG/1; MG/1
1 TABLET ORAL 2 TIMES DAILY PRN
COMMUNITY

## 2024-07-19 RX ORDER — ESCITALOPRAM OXALATE 10 MG/1
1 TABLET ORAL EVERY MORNING
COMMUNITY
Start: 2024-04-01

## 2024-07-19 RX ORDER — PREGABALIN 150 MG/1
150 CAPSULE ORAL 2 TIMES DAILY
COMMUNITY
Start: 2024-05-02

## 2024-07-19 NOTE — PROGRESS NOTES
[facility] GYNECOLOGY CLINIC NOTE     Galina Walker is a 53 y.o.  presenting to GYN clinic for uterine fibroids.     Patient with over a year of intermittent pain, now worsening in severity and frequency.  Patient with two recent ED visits for pelvic pain, was found to have fibroids on imaging.   Exacerbation started two weeks ago. Pain began suddenly. Localized to right lower quadrant/groin.   Pain now coming and going, lasts few minutes to few hours.   Improved with applying pressure.   Does not radiate.   Pain worsened with deeper positions during sex, now unable to engage in sex.   Patient reports occasional burning with urination, separate pain from what she is experiencing now.   Denies hematuria.   Patient reports usually has many, watery movements daily since colectomy for colon cancer 14 years ago.   Now patient reports small amount of more formed stool daily, BM less frequently.   Patient reports hx of bowel obstruction requiring NG tube around 6 years ago. Pain is not similar.   Patient also endorses feeling and abdominal bloating and tightness.   Menopause around 48.  When had menses, lasted 7-8 days, on heaviest days uses an entire pack of tampons.   Patient denies abnormal vaginal bleeding, abnormal discharge, vulvar rash or skin changes.   Sexually active with one  male partner.   Pap 2024 NILM/HPV.    Gynecology  OB History          4    Para   3    Term   2       1    AB   1    Living   4         SAB   1    IAB   0    Ectopic   0    Multiple   1    Live Births   4                Past Medical History:   Diagnosis Date    C5-C7 ACDF 2023    Cervical stenosis of spinal canal 2023    Pituitary mass 2022      Past Surgical History:   Procedure Laterality Date     SECTION  2006    COLECTOMY  2017    COLONOSCOPY  2022    COLONOSCOPY  2017    [facility], Rony Majano MD    NECK SURGERY  2019    SPINE SURGERY  2023    C5-C7 ACDF    TUBAL  "LIGATION  06/26/2006      CSX 2  Laparoscopic cystectomy x2     Current Outpatient Medications   Medication Instructions    acetaminophen-codeine 300-30mg (TYLENOL #3) 300-30 mg Tab 1 tablet, Oral, Every 8 hours PRN    atorvastatin (LIPITOR) 10 mg, Oral, Daily    busPIRone (BUSPAR) 7.5 mg, Oral, 3 times daily    EScitalopram oxalate (LEXAPRO) 10 MG tablet 1 tablet, Every morning    EScitalopram oxalate (LEXAPRO) 20 mg, Oral, Daily    gabapentin (NEURONTIN) 300 mg, Oral, Daily    HYDROcodone-acetaminophen (NORCO) 5-325 mg per tablet 1 tablet, Oral, 2 times daily PRN    hydrOXYzine HCL (ATARAX) 25 mg, Oral, 3 times daily PRN    levocetirizine (XYZAL) 5 mg, Oral, Nightly    mirabegron (MYRBETRIQ) 25 mg, Oral, Daily    ondansetron (ZOFRAN-ODT) 8 mg, Oral, Every 8 hours PRN    pregabalin (LYRICA) 150 mg, 3 times daily    pregabalin (LYRICA) 150 mg, Oral, 2 times daily    tiZANidine (ZANAFLEX) 4 mg, Oral, Every 8 hours PRN     Social History     Tobacco Use    Smoking status: Some Days     Current packs/day: 0.50     Average packs/day: 0.5 packs/day for 15.0 years (7.5 ttl pk-yrs)     Types: Cigarettes, Vaping with nicotine     Passive exposure: Current    Smokeless tobacco: Current    Tobacco comments:     4/4/2023- Pt states no longer smokes; now vapes with nicotine   Substance Use Topics    Alcohol use: Yes    Drug use: Not Currently     Frequency: 2.0 times per week     Types: Marijuana     Father with colon cancer at age 42.   Two paternal uncles with colon cancer  earlier than 40s.     Materal aunt with breast cancer  Mother with ovarian cancer.    Review of Systems  Pertinent items noted in HPI.    Objective:     Vitals:    07/19/24 1104   BP: 125/88   BP Location: Left arm   Patient Position: Sitting   BP Method: Small (Automatic)   Pulse: 76   Resp: 18   Temp: 97.8 °F (36.6 °C)   SpO2: 100%   Weight: 75.8 kg (167 lb)   Height: 5' 8" (1.727 m)     Body mass index is 25.39 kg/m².    Physical Exam:   General: Alert " and oriented, in no acute distress  Lungs: Breathing comfortably on room air, no conversational dyspnea  Heart: Regular rate, extremities well perfused  Abdomen: Soft, non-distended, low RLQ tender to palpation, no involuntary guarding, no rebound tenderness  Extremities: Atraumatic, non-edematous, no cords or calf tenderness, no significant calf/ankle edema  External genitalia: Normal female genitalia without lesion, discharge or tenderness. Normal appearing urethral meatus. Normal appearing external anus.  Bimanual Exam: Uterus 9 cm in size, retroverted, freely mobile, smooth in contour, no masses. No cervical motion tenderness. No adnexal fullness/tenderness. Patient with right adnexal tenderness, right levator ani, piriformis, and obturator tenderness. No bladder, bladder neck, rectum, or left pelvic floor tenderness. Fullness/mass palpable in posterior vagina consistent with large fibroid or uterine mass.   Speculum Exam: Vaginal mucosa pink and moist, without lesion. Scant, white discharge present in vaginal canal. Cervix well visualized, smooth in contour with no masses or lesions. Os normal in appearance without blood or discharge.   Note:  Female nurse chaperone present for entirety of exam.    Relevant Labs:   Lab Results   Component Value Date    WBC 7.59 07/12/2024    HGB 13.8 07/12/2024    HCT 41.3 07/12/2024    MCV 97.2 (H) 07/12/2024     07/12/2024       Relevant Imaging:  TVUS 7/2024:  EXAMINATION:  US PELVIS COMP WITH TRANSVAG NON-OB (XPD)     CLINICAL HISTORY:  pain;     COMPARISON:  2 February 2024, 2 July 2022     FINDINGS:  Transabdominal and transvaginal scanning of the pelvis with grayscale, color and spectral Doppler.     The uterus it is retroverted measuring about 7 cm in length.  The endometrium measures 2 mm.  There are uterine mass is similar to the prior ultrasound.  The largest measures up to 4 cm.     Neither ovary was visualized.     There is no adnexal mass or significant  pelvic free fluid.     Impression:     1. Similar appearance of uterine masses most likely leiomyoma.  2. Neither ovary was visualized.  3. No adnexal mass.        Electronically signed by:Kendall Moreau  Date:                                            2024  Time:                                           16:07        TVUS 2024:  EXAMINATION:  US PELVIS COMP WITH TRANSVAG NON-OB (XPD)     CLINICAL HISTORY:  .  Pelvic and perineal pain     TECHNIQUE:  Exam is performed both transabdominally and endovaginally     COMPARISON:  None     FINDINGS:  The uterus measures 6.7 x 3.7 x 4.6 cm.  The endometrial stripe is 1 mm.  There are 3 leiomyoma of the uterus.  The 1st measures 3.1 x 3.5 x 3.4 cm.  The 2nd measures 1.1 x 1.2 x 0.5 cm.  The 3rd measures 1.2 x 1.3 x 0.8 cm.     The right adnexa was evaluated the ovary is not identified.     The left adnexa was evaluated the ovary is not identified.     Impression:     Three leiomyoma of the uterus.     Non identification of the ovaries        Electronically signed by:Saulo Madrigal MD  Date:                                            2024  Time:                                           10:50    Assessment:       53 y.o.  here for pelvic pain, found to have uterine mass.       1. Dysuria  CANCELED: Urinalysis, Reflex to Urine Culture      2. Cervical stenosis of spinal canal  Ambulatory referral/consult to Neurosurgery      3. Pituitary mass  Ambulatory referral/consult to Neurosurgery      4. Cervical radiculopathy  Ambulatory referral/consult to Neurosurgery      5. Uterine leiomyoma, unspecified location  Ambulatory referral/consult to Gynecology      6. Routine screening for STI (sexually transmitted infection)  Chlamydia/GC, PCR    Wet Prep, Genital    HIV 1/2 Ag/Ab (4th Gen)    SYPHILIS ANTIBODY (WITH REFLEX RPR)    Hepatitis B Surface Antigen      7. Uterine mass  Lactate Dehydrogenase, Isoenzymes    HCG, Quantitative    Ambulatory referral/consult  to Gynecologic Oncology             Plan:     Pelvic pain:  - Suspect both pelvic mass and pelvic floor myalgia contributing to pain    Pelvic mass:  - Reviewed workup thus far including TVUS Feb 2024 and July 2024 with 0.5 cm of interval growth of uterine mass and flow noted within mass  - Ultrasound findings in setting of worsening pain and palpable mass on exam worrisome for malignancy, will refer patient to gynecologic oncology for further work up  - CT previously obtained, no lymphadenopathy noted  - Tumor markers HCG and lactate dehydrogenase today     Pelvic Floor Myalgia:  - Discussed findings of exam with patient  - Will rule out infection with wet prep/ GC/CT   - Patient to continue to use tylenol PRN, stretching   - Will proceed with Gyn Onc work up and possibly proceed with pelvic floor PT after    Routine STI testing today  - HIV, GC/CT, wet prep, syphilis, hep B    Patient and plan discussed with Dr Provost Sarahi Scott MD  [facility], PGY-3

## 2024-07-23 ENCOUNTER — HOSPITAL ENCOUNTER (EMERGENCY)
Facility: HOSPITAL | Age: 54
Discharge: HOME | End: 2024-07-23
Attending: INTERNAL MEDICINE
Payer: MEDICAID

## 2024-07-23 VITALS
HEART RATE: 77 BPM | HEIGHT: 69 IN | DIASTOLIC BLOOD PRESSURE: 78 MMHG | SYSTOLIC BLOOD PRESSURE: 122 MMHG | BODY MASS INDEX: 24.16 KG/M2 | TEMPERATURE: 98 F | OXYGEN SATURATION: 100 % | WEIGHT: 163.13 LBS | RESPIRATION RATE: 18 BRPM

## 2024-07-23 DIAGNOSIS — R10.9 ABDOMINAL PAIN: ICD-10-CM

## 2024-07-23 DIAGNOSIS — D25.9 UTERINE LEIOMYOMA, UNSPECIFIED LOCATION: Primary | ICD-10-CM

## 2024-07-23 LAB
BACTERIA #/AREA URNS AUTO: ABNORMAL /HPF
BILIRUB UR QL STRIP.AUTO: NEGATIVE
CLARITY UR: CLEAR
COLOR UR AUTO: ABNORMAL
GLUCOSE UR QL STRIP: NORMAL
HGB UR QL STRIP: NEGATIVE
HYALINE CASTS #/AREA URNS LPF: ABNORMAL /LPF
KETONES UR QL STRIP: ABNORMAL
LEUKOCYTE ESTERASE UR QL STRIP: NEGATIVE
MUCOUS THREADS URNS QL MICRO: ABNORMAL /LPF
NITRITE UR QL STRIP: NEGATIVE
PH UR STRIP: 5.5 [PH]
PROT UR QL STRIP: NEGATIVE
RBC #/AREA URNS AUTO: ABNORMAL /HPF
SP GR UR STRIP.AUTO: 1.01 (ref 1–1.03)
SQUAMOUS #/AREA URNS LPF: ABNORMAL /HPF
UROBILINOGEN UR STRIP-ACNC: NORMAL
WBC #/AREA URNS AUTO: ABNORMAL /HPF

## 2024-07-23 PROCEDURE — 96372 THER/PROPH/DIAG INJ SC/IM: CPT

## 2024-07-23 PROCEDURE — 81001 URINALYSIS AUTO W/SCOPE: CPT

## 2024-07-23 PROCEDURE — 63600175 PHARM REV CODE 636 W HCPCS

## 2024-07-23 RX ORDER — DICYCLOMINE HYDROCHLORIDE 10 MG/ML
20 INJECTION INTRAMUSCULAR
Status: COMPLETED | OUTPATIENT
Start: 2024-07-23 | End: 2024-07-23

## 2024-07-23 RX ORDER — HYOSCYAMINE SULFATE 0.12 MG/1
0.12 TABLET SUBLINGUAL 3 TIMES DAILY PRN
Qty: 20 TABLET | Refills: 0 | Status: SHIPPED | OUTPATIENT
Start: 2024-07-23

## 2024-07-23 RX ADMIN — DICYCLOMINE HYDROCHLORIDE 20 MG: 10 INJECTION, SOLUTION INTRAMUSCULAR at 06:07

## 2024-07-23 NOTE — ED PROVIDER NOTES
"Encounter Date: 2024       History     Chief Complaint   Patient presents with    Vaginal Pain    Breast Pain     Bilateral breast and vaginal "cramping" since this A.M. Also states history of fibroid tumors. Denies bleeding. Rates pain 10/10 at this time. Alfonso herbert     Pt is a 54 yo female with a history of uterine fibroids who presents to the ED c/o abdominal pain. Describes it as a constant, throbbing aching pain. The patient states a (R) lower groin pain and cramping pain radiating to her vaginal area with B/L breast pain starting this am. Also, she reports a pain with urination and (R) lower back pain which started around the end of .   Reports nausea, decrease in appetite/drinking, pain with urination, and pain with defecation  Denies hematuria, vaginal bleeding, and melena  Per chart review, the patient has a history of uterine fibroids with an increase in size. Patient is scheduled to follow up with heme/onc due to suspicion of a leiomyosarcoma.     The history is provided by the patient.     Review of patient's allergies indicates:   Allergen Reactions    Ibuprofen Nausea Only     Past Medical History:   Diagnosis Date    C5-C7 ACDF 2023    Cervical stenosis of spinal canal 2023    Pituitary mass 2022     Past Surgical History:   Procedure Laterality Date     SECTION  2006    COLECTOMY  2017    COLONOSCOPY  2022    COLONOSCOPY  2017    [facility], Rony Majano MD    NECK SURGERY  2019    SPINE SURGERY  2023    C5-C7 ACDF    TUBAL LIGATION  2006     Family History   Problem Relation Name Age of Onset    Ovarian cancer Mother Galina Magallanes 48    Arthritis Father Galina Magallanes     Colon cancer Father Galina Magallanes         age 40     Social History     Tobacco Use    Smoking status: Some Days     Current packs/day: 0.50     Average packs/day: 0.5 packs/day for 15.0 years (7.5 ttl pk-yrs)     Types: Cigarettes, Vaping with nicotine     Passive " exposure: Current    Smokeless tobacco: Current    Tobacco comments:     4/4/2023- Pt states no longer smokes; now vapes with nicotine   Substance Use Topics    Alcohol use: Yes    Drug use: Not Currently     Frequency: 2.0 times per week     Types: Marijuana     Review of Systems   Constitutional:  Positive for appetite change. Negative for chills and fever.   Respiratory:  Negative for cough, choking, chest tightness, shortness of breath, wheezing and stridor.    Cardiovascular:  Negative for chest pain and palpitations.   Gastrointestinal:  Positive for abdominal distention, abdominal pain, nausea and rectal pain. Negative for anal bleeding, blood in stool, constipation, diarrhea and vomiting.   Genitourinary:  Positive for pelvic pain and vaginal pain. Negative for dysuria, hematuria, vaginal bleeding and vaginal discharge.   Musculoskeletal:  Positive for back pain.   Neurological:  Negative for light-headedness and headaches.       Physical Exam     Initial Vitals [07/23/24 1538]   BP Pulse Resp Temp SpO2   124/82 78 18 98.3 °F (36.8 °C) 98 %      MAP       --         Physical Exam    Nursing note and vitals reviewed.  Constitutional: She appears well-developed and well-nourished. She is not diaphoretic. No distress.   HENT:   Head: Normocephalic and atraumatic.   Nose: Nose normal.   Mouth/Throat: No oropharyngeal exudate.   Eyes: EOM are normal. Pupils are equal, round, and reactive to light. Right eye exhibits no discharge. Left eye exhibits no discharge. No scleral icterus.   Neck: Neck supple.   Restricted ROM   Cardiovascular:  Normal rate, regular rhythm, normal heart sounds and intact distal pulses.     Exam reveals no gallop and no friction rub.       No murmur heard.  Pulmonary/Chest: Breath sounds normal. No respiratory distress. She has no wheezes. She has no rhonchi. She has no rales. She exhibits no tenderness.   B/L breast tenderness   Abdominal: Abdomen is soft. Bowel sounds are normal. She  exhibits no distension and no mass. There is abdominal tenderness.   In the (R) lower quadrant      There is no rebound, no guarding, no tenderness at McBurney's point and negative Walker's sign. negative Rovsing's sign  Musculoskeletal:      Cervical back: Neck supple.     Neurological: She is alert and oriented to person, place, and time. GCS score is 15. GCS eye subscore is 4. GCS verbal subscore is 5. GCS motor subscore is 6.   Skin: Skin is warm. Capillary refill takes less than 2 seconds. No rash and no abscess noted. No erythema. No pallor.   Psychiatric: She has a normal mood and affect. Her behavior is normal. Judgment and thought content normal.         ED Course   Procedures  Labs Reviewed   URINALYSIS - Abnormal       Result Value    Color, UA Light-Yellow      Appearance, UA Clear      Specific Gravity, UA 1.011      pH, UA 5.5      Protein, UA Negative      Glucose, UA Normal      Ketones, UA 1+ (*)     Blood, UA Negative      Bilirubin, UA Negative      Urobilinogen, UA Normal      Nitrites, UA Negative      Leukocyte Esterase, UA Negative      RBC, UA 0-5      WBC, UA 0-5      Bacteria, UA Many (*)     Squamous Epithelial Cells, UA Occ (*)     Mucous, UA Trace (*)     Hyaline Casts, UA None Seen            Imaging Results              X-Ray Abdomen Flat And Erect (Final result)  Result time 07/23/24 17:38:11      Final result by Britt Michael MD (07/23/24 17:38:11)                   Impression:      No acute abdominal radiographic abnormality.      Electronically signed by: Britt Michael  Date:    07/23/2024  Time:    17:38               Narrative:    EXAMINATION:  XR ABDOMEN FLAT AND ERECT    CLINICAL HISTORY:  Unspecified abdominal pain    TECHNIQUE:  Supine and upright views of the abdomen performed.    COMPARISON:  None.    FINDINGS:  No dilated bowel loops. No evidence of obstruction.    No evidence of free intraperitoneal air.    Scattered surgical clips in the abdomen.                                        Medications   dicyclomine injection 20 mg (20 mg Intramuscular Given 7/23/24 5738)     Medical Decision Making  Amount and/or Complexity of Data Reviewed  External Data Reviewed: labs.  Labs: ordered. Decision-making details documented in ED Course.  Radiology: ordered and independent interpretation performed. Decision-making details documented in ED Course.    Risk  Prescription drug management.      Additional MDM:   Differential Diagnosis:   Other: The following diagnoses were also considered and will be evaluated: uterine fibroids, uti and leiomyosarcoma.           Attending Attestation:   Physician Attestation Statement for Resident:  As the supervising MD   Physician Attestation Statement: I have personally seen and examined this patient.   I agree with the above history.  -:   As the supervising MD I agree with the above PE.     As the supervising MD I agree with the above treatment, course, plan, and disposition.    I have reviewed and agree with the residents interpretation of the following: lab data and x-rays.  I have reviewed the following: old records at this facility.                ED Course as of 07/23/24 2209   Tue Jul 23, 2024   1916 X-Ray Abdomen Flat And Erect [RC]      ED Course User Index  [RC] Carrie Solis MD                           Clinical Impression:  Final diagnoses:  [D25.9] Uterine leiomyoma, unspecified location (Primary)          ED Disposition Condition    Discharge Stable          ED Prescriptions       Medication Sig Dispense Start Date End Date Auth. Provider    hyoscyamine 0.125 mg Subl Place 1 tablet (0.125 mg total) under the tongue 3 (three) times daily as needed (abbominal pain). 20 tablet 7/23/2024 -- Carrie Solis MD          Follow-up Information       Follow up With Specialties Details Why Contact Info    Aisha Preston MD Family Medicine Schedule an appointment as soon as possible for a visit  Keep follow up appt with heme/onc  [address] [facility] [address]  [phone]               Carrei Solis MD  Resident  07/23/24 1904       Carrie Solis MD  Resident  07/23/24 1915       Tito Ocampo MD  07/23/24 0140

## 2024-07-23 NOTE — Clinical Note
"Galina Rochailla" Aaron was seen and treated in our emergency department on 7/23/2024.  She may return to work on 07/25/2024.       If you have any questions or concerns, please don't hesitate to call.      Анна GRAHAM    "

## 2024-07-24 ENCOUNTER — TELEPHONE (OUTPATIENT)
Dept: GYNECOLOGY | Facility: CLINIC | Age: 54
End: 2024-07-24
Payer: MEDICAID

## 2024-07-24 LAB — HCV RNA SERPL NAA+PROBE-LOG IU: NOT DETECTED {LOG_IU}/ML

## 2024-07-24 NOTE — PROGRESS NOTES
Please inform pt that her hepatitis C viral load remains undetected therefore she does not have active Hep C. I did see that she was recently seen by gyn resident clinic for uterine fibroids and there plan was to refer to gyn/onc in [place]. Please get with Kalyani and ensure this is processed .Thank you

## 2024-07-24 NOTE — TELEPHONE ENCOUNTER
Patient called stating that she doesn't want to have surgery in [place], wants to have here at [facility]. I've sent the referral and she hasn't heard from them yet, message sent to joi Dockery Thomas high priority

## 2024-07-24 NOTE — TELEPHONE ENCOUNTER
----- Message from ELVIN Holt sent at 7/24/2024  8:41 AM CDT -----  Please relay this to the gyn resident team so they can follow up.  ----- Message -----  From: Samantha Calderón MA  Sent: 7/24/2024   8:29 AM CDT  To: ELVIN Holt    Pt was informed. Pt states she does not want to go [place]. She wants to have a hysterectomy here.  ----- Message -----  From: Nichol Cantu FNP  Sent: 7/24/2024   7:56 AM CDT  To: Pepe Gandara V Staff    Please inform pt that her hepatitis C viral load remains undetected therefore she does not have active Hep C. I did see that she was recently seen by gyn resident clinic for uterine fibroids and there plan was to refer to gyn/onc in [place]. Please get with Kalyani and ensure this is processed .Thank you

## 2024-07-24 NOTE — TELEPHONE ENCOUNTER
Sent message and Dr Rivera requested soon appt to discuss surgery referral to [facility] here, July 31st, wed at 0845am, called patient and gave her appt time and date.

## 2024-08-05 ENCOUNTER — HOSPITAL ENCOUNTER (EMERGENCY)
Facility: HOSPITAL | Age: 54
Discharge: HOME | End: 2024-08-05
Attending: FAMILY MEDICINE
Payer: MEDICAID

## 2024-08-05 VITALS
BODY MASS INDEX: 24.91 KG/M2 | DIASTOLIC BLOOD PRESSURE: 82 MMHG | HEIGHT: 69 IN | HEART RATE: 81 BPM | WEIGHT: 168.19 LBS | SYSTOLIC BLOOD PRESSURE: 145 MMHG | OXYGEN SATURATION: 99 % | TEMPERATURE: 98 F | RESPIRATION RATE: 18 BRPM

## 2024-08-05 DIAGNOSIS — M54.12 CERVICAL RADICULOPATHY: Primary | ICD-10-CM

## 2024-08-05 PROCEDURE — 96372 THER/PROPH/DIAG INJ SC/IM: CPT | Performed by: NURSE PRACTITIONER

## 2024-08-05 PROCEDURE — 63600175 PHARM REV CODE 636 W HCPCS: Performed by: NURSE PRACTITIONER

## 2024-08-05 PROCEDURE — 99284 EMERGENCY DEPT VISIT MOD MDM: CPT | Mod: 25

## 2024-08-05 RX ORDER — KETOROLAC TROMETHAMINE 30 MG/ML
30 INJECTION, SOLUTION INTRAMUSCULAR; INTRAVENOUS
Status: COMPLETED | OUTPATIENT
Start: 2024-08-05 | End: 2024-08-05

## 2024-08-05 RX ADMIN — KETOROLAC TROMETHAMINE 30 MG: 30 INJECTION, SOLUTION INTRAMUSCULAR at 06:08

## 2024-08-05 NOTE — ED PROVIDER NOTES
"Encounter Date: 2024       History     Chief Complaint   Patient presents with    Neck Pain    Shoulder Pain     PT c/o bilateral neck pain and shoulder pain. States she has a hx neck surgery and has noticed some swelling to the right side and feeling that "something is poking my neck" .Pt states she has been taking her Norco and Gabapentin but it has not gotten any better.      The patient presents with neck pain.  The onset was chronic increased for a week.  The course/duration of symptoms is constant.  Location: neck. Type of injury: none and none known.  Radiating pain to both shoulders and the left upper extremity.  The character of symptoms is pain.  The degree at present is moderate.  There are exacerbating factors including movement and turning head.  There are relieving factors including rest and immobilization.  Prior episodes: chronic.  Therapy today: is taking norco and neurontin.  Associated symptoms: none.  Additional history: hx of 2 ACDF with the last one in 2023. Since her last surgery she has the uncomfortable sensation of something poking her neck. She had a NCS on  which demonstrated cervical radiculopathy. She is under care a neurosurgeon in [place] with her next appointment next week on .       Review of patient's allergies indicates:   Allergen Reactions    Ibuprofen Nausea Only     Past Medical History:   Diagnosis Date    C5-C7 ACDF 2023    Cervical stenosis of spinal canal 2023    Pituitary mass 2022     Past Surgical History:   Procedure Laterality Date     SECTION  2006    COLECTOMY  2017    COLONOSCOPY  2022    COLONOSCOPY  2017    [facility], Rony Majano MD    NECK SURGERY  2019    SPINE SURGERY  2023    C5-C7 ACDF    TUBAL LIGATION  2006     Family History   Problem Relation Name Age of Onset    Ovarian cancer Mother Galina Magallanes 48    Arthritis Father Galina Magallanes     Colon cancer Father Galina Magallanes "         age 40     Social History     Tobacco Use    Smoking status: Some Days     Current packs/day: 0.50     Average packs/day: 0.5 packs/day for 15.0 years (7.5 ttl pk-yrs)     Types: Cigarettes, Vaping with nicotine     Passive exposure: Current    Smokeless tobacco: Current    Tobacco comments:     4/4/2023- Pt states no longer smokes; now vapes with nicotine   Substance Use Topics    Alcohol use: Yes    Drug use: Not Currently     Frequency: 2.0 times per week     Types: Marijuana     Review of Systems   Constitutional:  Negative for fever.   HENT:  Negative for sore throat.    Respiratory:  Negative for shortness of breath.    Cardiovascular:  Negative for chest pain.   Gastrointestinal:  Negative for nausea.   Genitourinary:  Negative for dysuria.   Musculoskeletal:  Positive for neck pain. Negative for back pain.   Skin:  Negative for rash.   Neurological:  Negative for weakness.   Hematological:  Does not bruise/bleed easily.   All other systems reviewed and are negative.      Physical Exam     Initial Vitals [08/05/24 1708]   BP Pulse Resp Temp SpO2   (!) 145/82 81 17 98.2 °F (36.8 °C) 99 %      MAP       --         Physical Exam    Nursing note and vitals reviewed.  Constitutional: She appears well-developed and well-nourished.   HENT:   Head: Normocephalic and atraumatic.   Neck: Neck supple.   Normal range of motion.  Cardiovascular:  Normal rate, regular rhythm, normal heart sounds and intact distal pulses.           Pulmonary/Chest: Effort normal and breath sounds normal.   Abdominal: Abdomen is soft and flat. Bowel sounds are normal. There is no abdominal tenderness.   Musculoskeletal:         General: Normal range of motion.      Cervical back: Normal range of motion and neck supple.      Comments: Neck:  Supple, trachea midline, mild santa cervical paraspinal ttp, FROM, no c/t/l pt, mild weakness noted to L handgrip     Neurological: She is alert. She has normal strength.   Skin: Skin is warm and  dry.   Psychiatric: She has a normal mood and affect.         ED Course   Procedures  Labs Reviewed - No data to display       Imaging Results              X-Ray Cervical Spine 2 or 3 Views (Final result)  Result time 08/05/24 18:59:54      Final result by Nate Howell MD (08/05/24 18:59:54)                   Narrative:    EXAMINATION  XR CERVICAL SPINE 2 OR 3 VIEWS    CLINICAL HISTORY  neck pain;    TECHNIQUE  A total of 3 images submitted of the cervical spine.    COMPARISON  24 May 2023    FINDINGS  Exam quality: adequately visualized through C7    Vertebral segments: No cortical displacement or acute compression deformity. Similar postoperative changes of C4-5 ACDF, as well as anterior fusion hardware spanning C5 through C7.    Disc spaces: There is similar osseous fusion across the C4-5 disc space.  Interval progression of partial osseous fusion across C5-6 and C6-7.  Remaining intervertebral spaces are unchanged in comparison.    Alignment: No evidence of traumatic subluxation. The C1 lateral masses are symmetrically aligned on C2.    Curvature: No abnormal curvature is appreciated.    Soft tissues: No acute or focal abnormality. There is no thickening of the prevertebral tissues.  Scattered vascular calcifications similar in comparison.    IMPRESSION  1. No convincing evidence of acute cervical spine abnormality.  2. Similar postoperative changes, as detailed above.      Electronically signed by: Nate Howell  Date:    08/05/2024  Time:    18:59                                     Medications   ketorolac injection 30 mg (30 mg Intramuscular Given 8/5/24 1807)     Medical Decision Making  The patient presents with neck pain.  The onset was chronic increased for a week.  The course/duration of symptoms is constant.  Location: neck. Type of injury: none and none known.  Radiating pain to both shoulders and the left upper extremity.  The character of symptoms is pain.  The degree at present is moderate.   There are exacerbating factors including movement and turning head.  There are relieving factors including rest and immobilization.  Prior episodes: chronic.  Therapy today: is taking norco and neurontin.  Associated symptoms: none.  Additional history: hx of 2 ACDF with the last one in April 2023. Since her last surgery she has the uncomfortable sensation of something poking her neck. She had a NCS on 8/2 which demonstrated cervical radiculopathy. She is under care a neurosurgeon in [place] with her next appointment next week on 8/13.     She will continue present medications. She will follow up at her scheduled neurosurgery clinic appointment next week and her pcp. Return to ER precautions given.7:14 PM DISPOSITION: The patient is resting comfortably in no acute distress.  She is hemodynamically stable and is without objective evidence for acute process requiring urgent intervention or hospitalization. I provided counseling to patient with regard to condition, the treatment plan, specific conditions for return, and the importance of follow up. Detailed written and verbal instructions provided to patient and she expressed a verbal understanding of the discharge instructions and overall management plan. Reiterated the importance of medication administration and safety and advised patient to follow up with primary care provider in 3-5 days or sooner if needed.  Answered questions at this time. The patient is stable for discharge.         Amount and/or Complexity of Data Reviewed  Radiology: ordered. Decision-making details documented in ED Course.    Risk  Prescription drug management.      Additional MDM:   Differential Diagnosis:   At this time differential diagnosis is but not limited to fracture, sprain, cervical radiculopathy               ED Course as of 08/05/24 1916   Mon Aug 05, 2024   1904 X-Ray Cervical Spine 2 or 3 Views  IMPRESSION  1. No convincing evidence of acute cervical spine abnormality.  2.  Similar postoperative changes, as detailed above.   [RB]   1913 Given strict ED return precautions. I have spoken with the patient and/or caregivers. I have explained the patient's condition, diagnoses and treatment plan based on the information available to me at this time. I have answered the patient's and/or caregiver's questions and addressed any concerns. The patient and/or caregivers have as good an understanding of the patient's diagnosis, condition and treatment plan as can be expected at this point. The vital signs have been stable. The patient's condition is stable and appropriate for discharge from the emergency department.      The patient will pursue further outpatient evaluation with the primary care physician or other designated or consulting physician as outlined in the discharge instructions. The patient and/or caregivers are agreeable to this plan of care and follow-up instructions have been explained in detail. The patient and/or caregivers have received these instructions in written format and have expressed an understanding of the discharge instructions. The patient and/or caregivers are aware that any significant change in condition or worsening of symptoms should prompt an immediate return to this or the closest emergency department or a call to 911.      [RB]      ED Course User Index  [RB] [facility] Fitch, [facility]                           Clinical Impression:  Final diagnoses:  [M54.12] Cervical radiculopathy (Primary)          ED Disposition Condition    Discharge Stable          ED Prescriptions    None       Follow-up Information       Follow up With Specialties Details Why Contact Info    Aisha Preston MD Family Medicine In 3 days  [address] [facility] [id number] [phone]      [facility] Emergency Medicine  If symptoms worsen [address] [facility] [address] [phone]    follow up at scheduled neurosurgery clinic appointment  next week                 Christian Fitch, [facility]  08/05/24 1916

## 2024-08-27 ENCOUNTER — PATIENT MESSAGE (OUTPATIENT)
Dept: URGENT CARE | Facility: CLINIC | Age: 54
End: 2024-08-27

## 2024-08-28 ENCOUNTER — TELEPHONE (OUTPATIENT)
Dept: SMOKING CESSATION | Facility: CLINIC | Age: 54
End: 2024-08-28
Payer: MEDICAID

## 2024-08-28 NOTE — TELEPHONE ENCOUNTER
Contacted pt regarding rescheduling her canceled [facility] appointment.  Pt stated that she had surgery last week and needs to wait 6 weeks before scheduling another appointment. Pt rescheduled to September 30, 2024 at 8 am.

## 2024-09-14 ENCOUNTER — HOSPITAL ENCOUNTER (EMERGENCY)
Facility: HOSPITAL | Age: 54
Discharge: HOME | End: 2024-09-14
Attending: EMERGENCY MEDICINE
Payer: MEDICAID

## 2024-09-14 ENCOUNTER — NURSE TRIAGE (OUTPATIENT)
Dept: ADMINISTRATIVE | Facility: CLINIC | Age: 54
End: 2024-09-14
Payer: MEDICAID

## 2024-09-14 VITALS
HEART RATE: 80 BPM | BODY MASS INDEX: 25.31 KG/M2 | WEIGHT: 170.88 LBS | RESPIRATION RATE: 16 BRPM | OXYGEN SATURATION: 98 % | DIASTOLIC BLOOD PRESSURE: 69 MMHG | TEMPERATURE: 98 F | SYSTOLIC BLOOD PRESSURE: 120 MMHG | HEIGHT: 69 IN

## 2024-09-14 DIAGNOSIS — R10.9 ABDOMINAL PAIN, UNSPECIFIED ABDOMINAL LOCATION: Primary | ICD-10-CM

## 2024-09-14 LAB
ANION GAP SERPL CALC-SCNC: 10 MEQ/L
BACTERIA #/AREA URNS AUTO: ABNORMAL /HPF
BASOPHILS # BLD AUTO: 0.04 X10(3)/MCL
BASOPHILS NFR BLD AUTO: 0.6 %
BILIRUB UR QL STRIP.AUTO: NEGATIVE
BUN SERPL-MCNC: 10.8 MG/DL (ref 9.8–20.1)
CALCIUM SERPL-MCNC: 10 MG/DL (ref 8.4–10.2)
CHLORIDE SERPL-SCNC: 106 MMOL/L (ref 98–107)
CLARITY UR: CLEAR
CO2 SERPL-SCNC: 23 MMOL/L (ref 22–29)
COLOR UR AUTO: ABNORMAL
CREAT SERPL-MCNC: 0.9 MG/DL (ref 0.55–1.02)
CREAT/UREA NIT SERPL: 12
EOSINOPHIL # BLD AUTO: 0.15 X10(3)/MCL (ref 0–0.9)
EOSINOPHIL NFR BLD AUTO: 2.1 %
ERYTHROCYTE [DISTWIDTH] IN BLOOD BY AUTOMATED COUNT: 13 % (ref 11.5–17)
GFR SERPLBLD CREATININE-BSD FMLA CKD-EPI: >60 ML/MIN/1.73/M2
GLUCOSE SERPL-MCNC: 110 MG/DL (ref 74–100)
GLUCOSE UR QL STRIP: NORMAL
HCT VFR BLD AUTO: 38.5 % (ref 37–47)
HGB BLD-MCNC: 13.2 G/DL (ref 12–16)
HGB UR QL STRIP: NEGATIVE
HOLD SPECIMEN: NORMAL
HYALINE CASTS #/AREA URNS LPF: ABNORMAL /LPF
IMM GRANULOCYTES # BLD AUTO: 0.04 X10(3)/MCL (ref 0–0.04)
IMM GRANULOCYTES NFR BLD AUTO: 0.6 %
KETONES UR QL STRIP: NEGATIVE
LEUKOCYTE ESTERASE UR QL STRIP: 75
LIPASE SERPL-CCNC: 60 U/L
LYMPHOCYTES # BLD AUTO: 2.25 X10(3)/MCL (ref 0.6–4.6)
LYMPHOCYTES NFR BLD AUTO: 31.3 %
MAGNESIUM SERPL-MCNC: 2 MG/DL (ref 1.6–2.6)
MCH RBC QN AUTO: 32.8 PG (ref 27–31)
MCHC RBC AUTO-ENTMCNC: 34.3 G/DL (ref 33–36)
MCV RBC AUTO: 95.5 FL (ref 80–94)
MONOCYTES # BLD AUTO: 0.39 X10(3)/MCL (ref 0.1–1.3)
MONOCYTES NFR BLD AUTO: 5.4 %
MUCOUS THREADS URNS QL MICRO: ABNORMAL /LPF
NEUTROPHILS # BLD AUTO: 4.32 X10(3)/MCL (ref 2.1–9.2)
NEUTROPHILS NFR BLD AUTO: 60 %
NITRITE UR QL STRIP: NEGATIVE
NRBC BLD AUTO-RTO: 0 %
PH UR STRIP: 5.5 [PH]
PLATELET # BLD AUTO: 310 X10(3)/MCL (ref 130–400)
PMV BLD AUTO: 9.6 FL (ref 7.4–10.4)
POTASSIUM SERPL-SCNC: 3.9 MMOL/L (ref 3.5–5.1)
PROT UR QL STRIP: NEGATIVE
RBC # BLD AUTO: 4.03 X10(6)/MCL (ref 4.2–5.4)
RBC #/AREA URNS AUTO: ABNORMAL /HPF
SODIUM SERPL-SCNC: 139 MMOL/L (ref 136–145)
SP GR UR STRIP.AUTO: 1.01 (ref 1–1.03)
SQUAMOUS #/AREA URNS LPF: ABNORMAL /HPF
UROBILINOGEN UR STRIP-ACNC: NORMAL
WBC # BLD AUTO: 7.19 X10(3)/MCL (ref 4.5–11.5)
WBC #/AREA URNS AUTO: ABNORMAL /HPF

## 2024-09-14 PROCEDURE — 83735 ASSAY OF MAGNESIUM: CPT | Performed by: EMERGENCY MEDICINE

## 2024-09-14 PROCEDURE — 96372 THER/PROPH/DIAG INJ SC/IM: CPT | Performed by: EMERGENCY MEDICINE

## 2024-09-14 PROCEDURE — 80048 BASIC METABOLIC PNL TOTAL CA: CPT | Performed by: EMERGENCY MEDICINE

## 2024-09-14 PROCEDURE — 81001 URINALYSIS AUTO W/SCOPE: CPT | Performed by: EMERGENCY MEDICINE

## 2024-09-14 PROCEDURE — 85025 COMPLETE CBC W/AUTO DIFF WBC: CPT | Performed by: EMERGENCY MEDICINE

## 2024-09-14 PROCEDURE — 83690 ASSAY OF LIPASE: CPT | Performed by: EMERGENCY MEDICINE

## 2024-09-14 PROCEDURE — 25000003 PHARM REV CODE 250: Performed by: EMERGENCY MEDICINE

## 2024-09-14 PROCEDURE — 63600175 PHARM REV CODE 636 W HCPCS: Performed by: EMERGENCY MEDICINE

## 2024-09-14 PROCEDURE — 99284 EMERGENCY DEPT VISIT MOD MDM: CPT | Mod: 25

## 2024-09-14 PROCEDURE — 81015 MICROSCOPIC EXAM OF URINE: CPT | Performed by: EMERGENCY MEDICINE

## 2024-09-14 RX ORDER — DICLOFENAC SODIUM 50 MG/1
50 TABLET, DELAYED RELEASE ORAL 2 TIMES DAILY
Qty: 20 TABLET | Refills: 0 | Status: SHIPPED | OUTPATIENT
Start: 2024-09-14

## 2024-09-14 RX ORDER — KETOROLAC TROMETHAMINE 30 MG/ML
15 INJECTION, SOLUTION INTRAMUSCULAR; INTRAVENOUS
Status: COMPLETED | OUTPATIENT
Start: 2024-09-14 | End: 2024-09-14

## 2024-09-14 RX ORDER — CYCLOBENZAPRINE HCL 10 MG
10 TABLET ORAL
Status: COMPLETED | OUTPATIENT
Start: 2024-09-14 | End: 2024-09-14

## 2024-09-14 RX ORDER — CYCLOBENZAPRINE HCL 10 MG
10 TABLET ORAL 3 TIMES DAILY PRN
Qty: 15 TABLET | Refills: 0 | Status: SHIPPED | OUTPATIENT
Start: 2024-09-14 | End: 2024-09-19

## 2024-09-14 RX ORDER — ONDANSETRON 4 MG/1
4 TABLET, ORALLY DISINTEGRATING ORAL EVERY 8 HOURS PRN
Qty: 14 TABLET | Refills: 0 | Status: SHIPPED | OUTPATIENT
Start: 2024-09-14

## 2024-09-14 RX ADMIN — KETOROLAC TROMETHAMINE 15 MG: 30 INJECTION, SOLUTION INTRAMUSCULAR; INTRAVENOUS at 07:09

## 2024-09-14 RX ADMIN — CYCLOBENZAPRINE 10 MG: 10 TABLET, FILM COATED ORAL at 07:09

## 2024-09-14 NOTE — TELEPHONE ENCOUNTER
Called x2 no answer. Left VM x2    Reason for Disposition   Second attempt to contact caller AND no contact made. Phone number verified.    Additional Information   Negative: Caller is angry or rude (e.g., hangs up, verbally abusive, yelling)   Negative: Caller hangs up   Negative: Busy signal.  First attempt to contact caller.  Follow-up call scheduled within 15 minutes.   Negative: No answer.  First attempt to contact caller.  Follow-up call scheduled within 15 minutes.   Negative: Message left on identified voice mail   Negative: Message left on unidentified voice mail.  Phone number verified.   Negative: Message left with person in household.   Negative: Wrong number reached.  Phone number verified.    Protocols used: No Contact or Duplicate Contact Call-A-

## 2024-09-14 NOTE — TELEPHONE ENCOUNTER
Reason for Disposition   Severe pain in the incision    Additional Information   Negative: Sounds like a life-threatening emergency to the triager   Negative: Chest pain   Negative: Difficulty breathing   Negative: Acting confused (e.g., disoriented, slurred speech) or excessively sleepy   Surgical incision symptoms and questions   Negative: [1] Major abdominal surgical incision AND [2] wound gaping open AND [3] visible internal organs   Negative: Sounds like a life-threatening emergency to the triager   Negative: Patient has a Negative Pressure Wound Therapy device   Negative: Patient is followed by a wound clinic or wound specialist for this wound   Negative: Post-op total hip replacement, symptoms or questions about   Negative: Post-op total knee replacement, symptoms or questions about   Negative: [1] Bleeding from incision AND [2] won't stop after 10 minutes of direct pressure   Negative: [1] Bleeding (more than a few drops) from incision AND [2] tracheostomy or blood vessel surgery (e.g., carotid endarterectomy, femoral bypass graft, kidney dialysis fistula)   Negative: [1] Widespread rash AND [2] bright red, sunburn-like    Protocols used: Post-Op Symptoms and Chsrolukn-E-MR, Post-Op Incision Symptoms and Cyqlyotyz-I-BQ

## 2024-09-14 NOTE — TELEPHONE ENCOUNTER
Patient has called back with c/o pain at her naval and left incision site. She states she feels a taring like sensation inside.Patient is advised as per protocol.

## 2024-09-14 NOTE — ED PROVIDER NOTES
ED PROVIDER NOTE  2024    CHIEF COMPLAINT:   Chief Complaint   Patient presents with    Abdominal Pain     Lower abd pain x 4 days radiating to L flank region. Describes as cramping / tearing. Hysterectomy 3 weeks ago.        HISTORY OF PRESENT ILLNESS:   Galina Walker is a 53 y.o. female who presents with chief complaint Abdominal pain. Onset was earlier this week when  she began having left sided abdominal pain that is characterized as cramping and pulling sensation after having DIONE about 3 weeks ago. She reports pain is aggravated with movement and improved with rest.    The history is provided by the patient.         REVIEW OF SYSTEMS: as noted in the HPI.  NURSING NOTES REVIEWED      PAST MEDICAL/SURGICAL HISTORY:   Past Medical History:   Diagnosis Date    C5-C7 ACDF 2023    Cervical stenosis of spinal canal 2023    Pituitary mass 2022      Past Surgical History:   Procedure Laterality Date     SECTION  2006    COLECTOMY  2017    COLONOSCOPY  2022    COLONOSCOPY  2017    [facility], Rony Majano MD    NECK SURGERY  2019    SPINE SURGERY  2023    C5-C7 ACDF    TUBAL LIGATION  2006       FAMILY HISTORY:   Family History   Problem Relation Name Age of Onset    Ovarian cancer Mother Galina Magallanes 48    Arthritis Father Galina Magallanes     Colon cancer Father Galina Magallanes         age 40       SOCIAL HISTORY:   Social History     Tobacco Use    Smoking status: Some Days     Current packs/day: 0.50     Average packs/day: 0.5 packs/day for 15.0 years (7.5 ttl pk-yrs)     Types: Cigarettes, Vaping with nicotine     Passive exposure: Current    Smokeless tobacco: Current    Tobacco comments:     2023- Pt states no longer smokes; now vapes with nicotine   Substance Use Topics    Alcohol use: Yes    Drug use: Not Currently     Frequency: 2.0 times per week     Types: Marijuana       ALLERGIES:   Review of patient's allergies indicates:   Allergen  Reactions    Ibuprofen Nausea Only       PHYSICAL EXAM:  Initial Vitals [09/14/24 0711]   BP Pulse Resp Temp SpO2   (!) 157/79 76 16 98.7 °F (37.1 °C) 100 %      MAP       --         Physical Exam    Nursing note and vitals reviewed.  Constitutional: She appears well-developed and well-nourished.   HENT:   Head: Normocephalic and atraumatic.   Mouth/Throat: Uvula is midline and mucous membranes are normal.   Eyes: EOM are normal. Pupils are equal, round, and reactive to light.   Neck: Trachea normal. Neck supple.   Cardiovascular:  Normal rate, regular rhythm and normal pulses.           Pulmonary/Chest: Effort normal and breath sounds normal.   Abdominal: Abdomen is soft. Bowel sounds are normal. There is abdominal tenderness in the left upper quadrant and left lower quadrant. There is no rebound and no guarding.   Musculoskeletal:         General: Normal range of motion.      Cervical back: Neck supple.     Neurological: She is alert and oriented to person, place, and time. GCS eye subscore is 4. GCS verbal subscore is 5. GCS motor subscore is 6.   Skin: Skin is warm and dry.   Psychiatric: She has a normal mood and affect. Her speech is normal. Thought content normal.         RESULTS:  Labs Reviewed   BASIC METABOLIC PANEL - Abnormal       Result Value    Sodium 139      Potassium 3.9      Chloride 106      CO2 23      Glucose 110 (*)     Blood Urea Nitrogen 10.8      Creatinine 0.90      BUN/Creatinine Ratio 12      Calcium 10.0      Anion Gap 10.0      eGFR >60     URINALYSIS, REFLEX TO URINE CULTURE - Abnormal    Color, UA Light-Yellow      Appearance, UA Clear      Specific Gravity, UA 1.009      pH, UA 5.5      Protein, UA Negative      Glucose, UA Normal      Ketones, UA Negative      Blood, UA Negative      Bilirubin, UA Negative      Urobilinogen, UA Normal      Nitrites, UA Negative      Leukocyte Esterase, UA 75 (*)     RBC, UA 0-5      WBC, UA 6-10 (*)     Bacteria, UA Occ (*)     Squamous Epithelial  Cells, UA Few (*)     Mucous, UA Trace (*)     Hyaline Casts, UA 6-10 (*)    CBC WITH DIFFERENTIAL - Abnormal    WBC 7.19      RBC 4.03 (*)     Hgb 13.2      Hct 38.5      MCV 95.5 (*)     MCH 32.8 (*)     MCHC 34.3      RDW 13.0      Platelet 310      MPV 9.6      Neut % 60.0      Lymph % 31.3      Mono % 5.4      Eos % 2.1      Basophil % 0.6      Lymph # 2.25      Neut # 4.32      Mono # 0.39      Eos # 0.15      Baso # 0.04      IG# 0.04      IG% 0.6      NRBC% 0.0     LIPASE - Normal    Lipase Level 60     MAGNESIUM - Normal    Magnesium Level 2.00     CBC W/ AUTO DIFFERENTIAL    Narrative:     The following orders were created for panel order CBC auto differential.  Procedure                               Abnormality         Status                     ---------                               -----------         ------                     CBC with Differential[[id number]]       Abnormal            Final result                 Please view results for these tests on the individual orders.   EXTRA TUBES    Narrative:     The following orders were created for panel order EXTRA TUBES.  Procedure                               Abnormality         Status                     ---------                               -----------         ------                     Light Blue Top Hold[[id number]]                             Final result               Red Top Hold[[id number]]                                    Final result               Light Green Top Hold[[id number]]                            Final result               Lavender Top Hold[[id number]]                               Final result               Gold Top Hold[[id number]]                                   Final result               Pink Top Hold[[id number]]                                   Final result                 Please view results for these tests on the individual orders.   LIGHT BLUE TOP HOLD    Extra Tube Hold for add-ons.     RED TOP HOLD    Extra Tube  Hold for add-ons.     LIGHT GREEN TOP HOLD    Extra Tube Hold for add-ons.     LAVENDER TOP HOLD    Extra Tube Hold for add-ons.     GOLD TOP HOLD    Extra Tube Hold for add-ons.     PINK TOP HOLD    Extra Tube Hold for add-ons.       Imaging Results    None         PROCEDURES:  Procedures    ECG:       ED COURSE AND MEDICAL DECISION MAKING:  Medications   ketorolac injection 15 mg (15 mg Intramuscular Given 9/14/24 0733)   cyclobenzaprine tablet 10 mg (10 mg Oral Given 9/14/24 0733)     ED Course as of 09/14/24 0906   Sat Sep 14, 2024   0758 WBC: 7.19 [IB]   0758 Hemoglobin: 13.2 [IB]   0758 Platelet Count: 310 [IB]   0810 Creatinine: 0.90 [IB]   0810 Lipase: 60 [IB]   0810 NITRITE UA: Negative [IB]   0810 Leukocyte Esterase, UA(!): 75 [IB]   0810 RBC, UA: 0-5 [IB]   0810 WBC, UA(!): 6-10 [IB]   0810 Bacteria, UA(!): Occ [IB]   0858 Reports feeling better after toradol and flexeril. [IB]      ED Course User Index  [IB] Kevin Pinon, DO        Medical Decision Making  This patient presents with abdominal pain of unclear etiology. Their evaluation has not identified a emergent etiology for the abdominal pain. Specifically, given the very benign exam, normal laboratory studies, and lack of significant risk factors, I have a very low suspicion for appendicitis, ischemic bowel, bowel perforation, or any other life threatening disease. I have discussed with the patient the level of uncertainty with undifferentiated abdominal pain and clearly explained the need to follow-up as noted on the discharge instructions, or return to the Emergency Department immediately if the pain worsens, develops fever, persistent and uncontrollable vomiting, or for any new symptoms or concerns. I discussed with the patient that this presentation today for abdominal pain could represent a significant risk for an acute abdominal process. Although the tests in the ED were essentially normal, there is still a possibility of a process such as  appendicitis, diverticulitis, cholecystitis, ulcer, early bowel obstruction, mesenteric ischemia, kidney stone, or even kidney infection which could subsequently cause disability or death. The patient understands that they must return within 24 hours for a recheck or see their physician within 24 hours for re-exam due to the possibility of significant surgical or medical process.  Suspect muscular etiology from her recent surgery as she has no pain unless she uses her abdominal muscles.  She got some relief with Flexeril so we will discharge her with Flexeril and instructed her to follow up with her surgeon.  Given strict ED return precautions. I have spoken with the patient and/or caregivers. I have explained the patient's condition, diagnoses and treatment plan based on the information available to me at this time. I have answered the patient's and/or caregiver's questions and addressed any concerns. The patient and/or caregivers have as good an understanding of the patient's diagnosis, condition and treatment plan as can be expected at this point. The vital signs have been stable. The patient's condition is stable and appropriate for discharge from the emergency department.     The patient will pursue further outpatient evaluation with the primary care physician or other designated or consulting physician as outlined in the discharge instructions. The patient and/or caregivers are agreeable to this plan of care and follow-up instructions have been explained in detail. The patient and/or caregivers have received these instructions in written format and have expressed an understanding of the discharge instructions. The patient and/or caregivers are aware that any significant change in condition or worsening of symptoms should prompt an immediate return to this or the closest emergency department or a call to 911.    Amount and/or Complexity of Data Reviewed  Labs: ordered. Decision-making details documented in ED  Course.    Risk  OTC drugs.  Prescription drug management.  Decision regarding hospitalization.        CLINICAL IMPRESSION:  1. Abdominal pain, unspecified abdominal location        DISPOSITION:   ED Disposition Condition    Discharge Stable            ED Prescriptions       Medication Sig Dispense Start Date End Date Auth. Provider    cyclobenzaprine (FLEXERIL) 10 MG tablet Take 1 tablet (10 mg total) by mouth 3 (three) times daily as needed for Muscle spasms. 15 tablet 9/14/2024 9/19/2024 Kevin Pinon DO    diclofenac (VOLTAREN) 50 MG EC tablet Take 1 tablet (50 mg total) by mouth 2 (two) times daily. 20 tablet 9/14/2024 -- Kevin Pinon DO    ondansetron (ZOFRAN-ODT) 4 MG TbDL Take 1 tablet (4 mg total) by mouth every 8 (eight) hours as needed (nausea and vomiting). 14 tablet 9/14/2024 -- Kevin Pinon DO          Follow-up Information       Follow up With Specialties Details Why Contact Info    Aisha Preston MD Family Medicine Schedule an appointment as soon as possible for a visit   [facility] [address]  [phone]      [facility] Emergency Medicine  If symptoms worsen [facility] [facility] [address] [phone]               Kevin Pinon DO  09/14/24 0970

## 2024-09-19 ENCOUNTER — TELEPHONE (OUTPATIENT)
Dept: GYNECOLOGY | Facility: CLINIC | Age: 54
End: 2024-09-19
Payer: MEDICAID

## 2024-09-19 NOTE — TELEPHONE ENCOUNTER
Asthma & COPD Medication Protocol Passed06/26/2024 09:04 AM   Protocol Details Asthma Action Score greater than or equal to 20    Appointment in past 6 or next 3 months    AAP/ACT given in last 12 months         Patient will come in tomorrow at 8 per Dr Sanz's request. could not drive to [facility] for today's appt due to pain. Email sent to Dr Neal and approved.

## 2024-09-20 ENCOUNTER — PROCEDURE VISIT (OUTPATIENT)
Dept: GYNECOLOGY | Facility: CLINIC | Age: 54
End: 2024-09-20
Payer: MEDICAID

## 2024-09-20 VITALS
WEIGHT: 166.81 LBS | DIASTOLIC BLOOD PRESSURE: 71 MMHG | HEIGHT: 69 IN | TEMPERATURE: 98 F | RESPIRATION RATE: 12 BRPM | BODY MASS INDEX: 24.71 KG/M2 | HEART RATE: 78 BPM | SYSTOLIC BLOOD PRESSURE: 106 MMHG

## 2024-09-20 DIAGNOSIS — N95.1 PERIMENOPAUSAL VASOMOTOR SYMPTOMS: ICD-10-CM

## 2024-09-20 DIAGNOSIS — Z09 POSTOP CHECK: ICD-10-CM

## 2024-09-20 DIAGNOSIS — N95.1 VASOMOTOR SYMPTOMS DUE TO MENOPAUSE: ICD-10-CM

## 2024-09-20 RX ORDER — OXYCODONE HYDROCHLORIDE 5 MG/1
5 TABLET ORAL EVERY 6 HOURS PRN
COMMUNITY
Start: 2024-08-23

## 2024-09-20 RX ORDER — ESTRADIOL 0.03 MG/D
1 PATCH TRANSDERMAL
Qty: 4 PATCH | Refills: 11 | Status: SHIPPED | OUTPATIENT
Start: 2024-09-20 | End: 2025-09-20

## 2024-09-20 RX ORDER — CIPROFLOXACIN 250 MG/1
1 TABLET, FILM COATED ORAL 2 TIMES DAILY
COMMUNITY
Start: 2024-09-19 | End: 2024-09-26

## 2024-09-21 NOTE — PROGRESS NOTES
"  U OB/GYN CLINIC NOTE  [facility]  [address]  [place] [address]  Phone: [phone]  Fax: [phone]    Postoperative Follow-Up    Subjective:      Galina Walker is a 53 y.o.  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 Patient missed her postop appointment in [place] yesterday due abdominal pain. She presents to [facility] clinic for follow up of abdominal pain and cuff check weeks post-op.     Today she reports abdominal pain over the last week that comes and goes, associated with burning with urination. Patient reports gyn onc team sent her a course of bactrim for suspected UTI. She reports feeling better since starting this medication yesterday. Pain and dysuria have improved. Denies fevers, chills, vaginal bleeding, chest pain, or SOB.     Eating a regular diet without issue nausea or vomiting. Having regular bowel movements. Endorses significant hot flashes since since surgery.   Patient's medications, allergies, past medical, surgical, social and family histories were reviewed and updated as appropriate.    Review of Systems  Denies fevers, chills, headache, blurry vision, nausea, vomiting, dizziness, or syncope.   Denies chest pain, shortness of breath, RUQ pain, or calf pain.     Objective:     Vitals:    24 0830   BP: 106/71   BP Location: Right arm   Patient Position: Sitting   BP Method: Medium (Automatic)   Pulse: 78   Resp: 12   Temp: 98 °F (36.7 °C)   TempSrc: Oral   Weight: 75.7 kg (166 lb 12.8 oz)   Height: 5' 9" (1.753 m)       Physical Exam:     General: alert and oriented, in no acute distress  Lungs: clear to auscultation bilaterally, no conversational dyspnea  Heart: regular rate and rhythm  Abdomen: Soft, non-distended, non tender to palpation, no involuntary guarding, no rebound tenderness  Incision Sites: laparoscopic incisions  well healed   Extremities: Normal, " atraumatic, non-edematous, No cords or calf tenderness, No significant calf/ankle edema  External genitalia: Normal female genitalia without lesion, discharge or tenderness. Normal appearing urethral meatus. Normal appearing external anus.  Bimanual Exam: Vaginal cuff digitally intact. Nontender  Speculum Exam: Vaginal mucosa normal in appearance. Pink. Vaginal cuff well healing, midline suture visible without erythema or bleeding. Probed without defect.     Note: RN chaperone present for entirety of exam.      Assessment:     Galina Walker is a 53 y.o.  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 Seen for postop visit in [facility] clinic     Plan:     Hot Flashes  - Pt in surgical menopause s/p TLH/BSO for benign uterine fibroid. Now with vasomotor symptoms. Discussed management with HRT. Medical history reviewed and pt w/o contraindication. Patient w/ previous h/o colon cancer s/p colectomy. Underwent genetic testing that was unremarkable.   - Climara patch sent to patient's pharmacy.     Postop care  Continue any current medications.  Wound care discussed.  Activity restrictions: nothing in the vagina  Anticipated return to work: Now  Follow up: 2 weeks for final postop exam     Patient and plan were discussed with Dr. Rivera.    Harjinder Packer MD  [employer] [facility]  PGY-4

## 2024-09-30 ENCOUNTER — CLINICAL SUPPORT (OUTPATIENT)
Dept: SMOKING CESSATION | Facility: CLINIC | Age: 54
End: 2024-09-30
Payer: MEDICAID

## 2024-09-30 DIAGNOSIS — F17.200 NICOTINE DEPENDENCE: Primary | ICD-10-CM

## 2024-09-30 PROCEDURE — 99404 PREV MED CNSL INDIV APPRX 60: CPT | Mod: S$GLB,,,

## 2024-09-30 RX ORDER — IBUPROFEN 200 MG
1 TABLET ORAL DAILY
Qty: 28 PATCH | Refills: 0 | Status: SHIPPED | OUTPATIENT
Start: 2024-09-30

## 2024-09-30 NOTE — PROGRESS NOTES
Patient will be participating in tobacco cessation meetings and will begin the prescribed tobacco cessation medication regimen of 21 mg nicotine patch QD. Patient currently smokes 5-10 cigarettes per day and vapes.  Pt does not know the nicotine concentration or percentage in her vape.  Pt stated that her vape is disposable and lasts 2 months. Discussed the role of tobacco cessation program, role of nicotine replacement therapy (NRT) and behavioral changes to assist the patient to reach her goal of being tobacco free. Education and instruction on the role of the NRT, usage and proper placement of the patch. Patient verbalized understanding and willingness to use the patch. Pt started on rate reduction and wait time of 15 min prior to smoking. Pt's exhaled carbon monoxide level was 36 ppm as per [employer]. (non- smoker = 0-5 ppm.)

## 2024-10-01 DIAGNOSIS — F41.1 GAD (GENERALIZED ANXIETY DISORDER): ICD-10-CM

## 2024-10-02 RX ORDER — BUSPIRONE HYDROCHLORIDE 7.5 MG/1
TABLET ORAL
Qty: 90 TABLET | Refills: 2 | Status: SHIPPED | OUTPATIENT
Start: 2024-10-02

## 2024-10-04 ENCOUNTER — OFFICE VISIT (OUTPATIENT)
Dept: GYNECOLOGY | Facility: CLINIC | Age: 54
End: 2024-10-04
Payer: MEDICAID

## 2024-10-04 VITALS
BODY MASS INDEX: 25.68 KG/M2 | TEMPERATURE: 98 F | WEIGHT: 173.38 LBS | OXYGEN SATURATION: 100 % | HEART RATE: 71 BPM | SYSTOLIC BLOOD PRESSURE: 123 MMHG | DIASTOLIC BLOOD PRESSURE: 87 MMHG | HEIGHT: 69 IN

## 2024-10-04 DIAGNOSIS — Z09 POSTOP CHECK: Primary | ICD-10-CM

## 2024-10-04 PROCEDURE — 99214 OFFICE O/P EST MOD 30 MIN: CPT | Mod: PBBFAC

## 2024-10-04 RX ORDER — CEFDINIR 300 MG/1
300 CAPSULE ORAL
COMMUNITY
Start: 2024-09-29 | End: 2024-10-06

## 2024-10-04 RX ORDER — CONJUGATED ESTROGENS 0.62 MG/G
1 CREAM VAGINAL DAILY
Qty: 1 APPLICATOR | Refills: 11 | Status: SHIPPED | OUTPATIENT
Start: 2024-10-04 | End: 2025-10-04

## 2024-10-04 NOTE — PROGRESS NOTES
"  U OB/GYN CLINIC NOTE  [facility]  [address] [place] [address]  Phone: [phone]  Fax: [phone]    Postoperative Follow-Up    Subjective:      Galina Walker is a 53 y.o.  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  She presents to [facility] clinic for follow up of abdominal pain and another cuff check 6 weeks post-op.     Patient reports that antibiotic has improved her dysuria, but continues to have some burning with urination. Patient started on HRT last visit for hot flashes and mood. Reports vast improvement in symptoms Denies fevers, chills, vaginal bleeding, chest pain, or SOB. Eating a regular diet without issue nausea or vomiting. Having regular bowel movements.   Patient's medications, allergies, past medical, surgical, social and family histories were reviewed and updated as appropriate.    Review of Systems  Denies fevers, chills, headache, blurry vision, nausea, vomiting, dizziness, or syncope.   Denies chest pain, shortness of breath, RUQ pain, or calf pain.     Objective:     Vitals:    10/04/24 0819   BP: 123/87   BP Location: Left arm   Pulse: 71   Temp: 97.9 °F (36.6 °C)   SpO2: 100%   Weight: 78.7 kg (173 lb 6.4 oz)   Height: 5' 9" (1.753 m)       Physical Exam:     General: alert and oriented, in no acute distress  Lungs: clear to auscultation bilaterally, no conversational dyspnea  Heart: regular rate and rhythm  Abdomen: Soft, non-distended, non tender to palpation, no involuntary guarding, no rebound tenderness  Incision Sites: laparoscopic incisions  well healed   Extremities: Normal, atraumatic, non-edematous, No cords or calf tenderness, No significant calf/ankle edema  External genitalia: Normal female genitalia without lesion, discharge or tenderness. Normal appearing urethral meatus. Normal appearing external anus.  Bimanual Exam: Vaginal cuff digitally intact. " Nontender  Speculum Exam: Vaginal mucosa normal in appearance. Pink. Vaginal cuff well healed    Note: RN chaperone present for entirety of exam.    Assessment:     Galina Walker is a 53 y.o.  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     Plan:       Postop care  Continue any current medications.  Activity restrictions: none  Anticipated return to work: Now  Follow up: in 1 year for well woman    Patient and plan were discussed with Dr. Rivera.    Harjinder Packer MD  [employer] [facility]  PGY-4

## 2024-10-07 ENCOUNTER — TELEPHONE (OUTPATIENT)
Dept: SMOKING CESSATION | Facility: CLINIC | Age: 54
End: 2024-10-07
Payer: MEDICAID

## 2024-10-07 RX ORDER — DM/P-EPHED/ACETAMINOPH/DOXYLAM 30-7.5/3
2 LIQUID (ML) ORAL
Qty: 108 LOZENGE | Refills: 0 | Status: SHIPPED | OUTPATIENT
Start: 2024-10-07

## 2024-10-07 NOTE — TELEPHONE ENCOUNTER
Pt called stating that the nicotine patches were causing her to have headaches.  Pt stated that she has worn the patch for 3 days and has been experiencing headaches everyday since starting the patches.  Pt would like to try the nicotine lozenges.  Will order 2 mg nicotine lozenges.  Discussed usage and placement of lozenges.

## 2024-10-11 ENCOUNTER — HOSPITAL ENCOUNTER (EMERGENCY)
Facility: HOSPITAL | Age: 54
Discharge: LEFT BEFORE DISCHARGE | End: 2024-10-11
Payer: MEDICAID

## 2024-10-11 ENCOUNTER — HOSPITAL ENCOUNTER (EMERGENCY)
Facility: HOSPITAL | Age: 54
Discharge: HOME | End: 2024-10-11
Attending: EMERGENCY MEDICINE
Payer: MEDICAID

## 2024-10-11 VITALS
SYSTOLIC BLOOD PRESSURE: 119 MMHG | DIASTOLIC BLOOD PRESSURE: 89 MMHG | TEMPERATURE: 97 F | WEIGHT: 168.44 LBS | BODY MASS INDEX: 24.87 KG/M2 | HEART RATE: 66 BPM | RESPIRATION RATE: 18 BRPM | OXYGEN SATURATION: 94 %

## 2024-10-11 VITALS
OXYGEN SATURATION: 100 % | HEIGHT: 69 IN | WEIGHT: 168 LBS | HEART RATE: 79 BPM | SYSTOLIC BLOOD PRESSURE: 122 MMHG | RESPIRATION RATE: 16 BRPM | BODY MASS INDEX: 24.88 KG/M2 | TEMPERATURE: 98 F | DIASTOLIC BLOOD PRESSURE: 79 MMHG

## 2024-10-11 DIAGNOSIS — G43.909 MIGRAINE WITHOUT STATUS MIGRAINOSUS, NOT INTRACTABLE, UNSPECIFIED MIGRAINE TYPE: ICD-10-CM

## 2024-10-11 DIAGNOSIS — T40.2X5A OPIOID-INDUCED CONSTIPATION: Primary | ICD-10-CM

## 2024-10-11 DIAGNOSIS — R07.9 CHEST PAIN: ICD-10-CM

## 2024-10-11 DIAGNOSIS — K59.03 OPIOID-INDUCED CONSTIPATION: Primary | ICD-10-CM

## 2024-10-11 LAB
ALBUMIN SERPL-MCNC: 4.6 G/DL (ref 3.5–5)
ALBUMIN/GLOB SERPL: 1.3 RATIO (ref 1.1–2)
ALP SERPL-CCNC: 68 UNIT/L (ref 40–150)
ALT SERPL-CCNC: 18 UNIT/L (ref 0–55)
ANION GAP SERPL CALC-SCNC: 11 MEQ/L
AST SERPL-CCNC: 17 UNIT/L (ref 5–34)
BACTERIA #/AREA URNS AUTO: ABNORMAL /HPF
BACTERIA #/AREA URNS AUTO: ABNORMAL /HPF
BASOPHILS # BLD AUTO: 0.04 X10(3)/MCL
BASOPHILS NFR BLD AUTO: 0.7 %
BILIRUB SERPL-MCNC: 0.5 MG/DL
BILIRUB UR QL STRIP.AUTO: NEGATIVE
BILIRUB UR QL STRIP.AUTO: NEGATIVE
BUN SERPL-MCNC: 21.2 MG/DL (ref 9.8–20.1)
CALCIUM SERPL-MCNC: 10.4 MG/DL (ref 8.4–10.2)
CHLORIDE SERPL-SCNC: 112 MMOL/L (ref 98–107)
CLARITY UR: CLEAR
CLARITY UR: CLEAR
CO2 SERPL-SCNC: 17 MMOL/L (ref 22–29)
COLOR UR AUTO: ABNORMAL
COLOR UR AUTO: ABNORMAL
CREAT SERPL-MCNC: 0.8 MG/DL (ref 0.55–1.02)
CREAT/UREA NIT SERPL: 27
EOSINOPHIL # BLD AUTO: 0.07 X10(3)/MCL (ref 0–0.9)
EOSINOPHIL NFR BLD AUTO: 1.2 %
ERYTHROCYTE [DISTWIDTH] IN BLOOD BY AUTOMATED COUNT: 12.8 % (ref 11.5–17)
GFR SERPLBLD CREATININE-BSD FMLA CKD-EPI: >60 ML/MIN/1.73/M2
GLOBULIN SER-MCNC: 3.6 GM/DL (ref 2.4–3.5)
GLUCOSE SERPL-MCNC: 73 MG/DL (ref 74–100)
GLUCOSE UR QL STRIP: NORMAL
GLUCOSE UR QL STRIP: NORMAL
HCT VFR BLD AUTO: 44.5 % (ref 37–47)
HGB BLD-MCNC: 14 G/DL (ref 12–16)
HGB UR QL STRIP: NEGATIVE
HGB UR QL STRIP: NEGATIVE
HYALINE CASTS #/AREA URNS LPF: ABNORMAL /LPF
HYALINE CASTS #/AREA URNS LPF: ABNORMAL /LPF
IMM GRANULOCYTES # BLD AUTO: 0.04 X10(3)/MCL (ref 0–0.04)
IMM GRANULOCYTES NFR BLD AUTO: 0.7 %
KETONES UR QL STRIP: NEGATIVE
KETONES UR QL STRIP: NEGATIVE
LEUKOCYTE ESTERASE UR QL STRIP: 25
LEUKOCYTE ESTERASE UR QL STRIP: 250
LYMPHOCYTES # BLD AUTO: 2.86 X10(3)/MCL (ref 0.6–4.6)
LYMPHOCYTES NFR BLD AUTO: 50.4 %
MAGNESIUM SERPL-MCNC: 2.3 MG/DL (ref 1.6–2.6)
MCH RBC QN AUTO: 31.7 PG (ref 27–31)
MCHC RBC AUTO-ENTMCNC: 31.5 G/DL (ref 33–36)
MCV RBC AUTO: 100.7 FL (ref 80–94)
MONOCYTES # BLD AUTO: 0.28 X10(3)/MCL (ref 0.1–1.3)
MONOCYTES NFR BLD AUTO: 4.9 %
MUCOUS THREADS URNS QL MICRO: ABNORMAL /LPF
MUCOUS THREADS URNS QL MICRO: ABNORMAL /LPF
NEUTROPHILS # BLD AUTO: 2.39 X10(3)/MCL (ref 2.1–9.2)
NEUTROPHILS NFR BLD AUTO: 42.1 %
NITRITE UR QL STRIP: NEGATIVE
NITRITE UR QL STRIP: NEGATIVE
NRBC BLD AUTO-RTO: 0 %
PH UR STRIP: 5.5 [PH]
PH UR STRIP: 5.5 [PH]
PLATELET # BLD AUTO: 159 X10(3)/MCL (ref 130–400)
PLATELETS.RETICULATED NFR BLD AUTO: 4.5 % (ref 0.9–11.2)
PMV BLD AUTO: 10.7 FL (ref 7.4–10.4)
POTASSIUM SERPL-SCNC: 3.9 MMOL/L (ref 3.5–5.1)
PROT SERPL-MCNC: 8.2 GM/DL (ref 6.4–8.3)
PROT UR QL STRIP: ABNORMAL
PROT UR QL STRIP: ABNORMAL
RBC # BLD AUTO: 4.42 X10(6)/MCL (ref 4.2–5.4)
RBC #/AREA URNS AUTO: ABNORMAL /HPF
RBC #/AREA URNS AUTO: ABNORMAL /HPF
SODIUM SERPL-SCNC: 140 MMOL/L (ref 136–145)
SP GR UR STRIP.AUTO: 1.02 (ref 1–1.03)
SP GR UR STRIP.AUTO: 1.03 (ref 1–1.03)
SQUAMOUS #/AREA URNS LPF: ABNORMAL /HPF
SQUAMOUS #/AREA URNS LPF: ABNORMAL /HPF
TROPONIN I SERPL-MCNC: <0.01 NG/ML (ref 0–0.04)
UROBILINOGEN UR STRIP-ACNC: NORMAL
UROBILINOGEN UR STRIP-ACNC: NORMAL
WBC # BLD AUTO: 5.68 X10(3)/MCL (ref 4.5–11.5)
WBC #/AREA URNS AUTO: ABNORMAL /HPF
WBC #/AREA URNS AUTO: ABNORMAL /HPF

## 2024-10-11 PROCEDURE — 80053 COMPREHEN METABOLIC PANEL: CPT | Performed by: PHYSICIAN ASSISTANT

## 2024-10-11 PROCEDURE — 99285 EMERGENCY DEPT VISIT HI MDM: CPT | Mod: 25,27

## 2024-10-11 PROCEDURE — 25000003 PHARM REV CODE 250: Performed by: PHYSICIAN ASSISTANT

## 2024-10-11 PROCEDURE — 83735 ASSAY OF MAGNESIUM: CPT | Performed by: PHYSICIAN ASSISTANT

## 2024-10-11 PROCEDURE — 93005 ELECTROCARDIOGRAM TRACING: CPT

## 2024-10-11 PROCEDURE — 85025 COMPLETE CBC W/AUTO DIFF WBC: CPT | Performed by: PHYSICIAN ASSISTANT

## 2024-10-11 PROCEDURE — 99282 EMERGENCY DEPT VISIT SF MDM: CPT | Mod: 25

## 2024-10-11 PROCEDURE — 84484 ASSAY OF TROPONIN QUANT: CPT | Performed by: PHYSICIAN ASSISTANT

## 2024-10-11 PROCEDURE — 81001 URINALYSIS AUTO W/SCOPE: CPT

## 2024-10-11 PROCEDURE — 81001 URINALYSIS AUTO W/SCOPE: CPT | Performed by: PHYSICIAN ASSISTANT

## 2024-10-11 RX ORDER — BUTALBITAL, ACETAMINOPHEN AND CAFFEINE 50; 325; 40 MG/1; MG/1; MG/1
1 TABLET ORAL EVERY 4 HOURS PRN
Qty: 12 TABLET | Refills: 0 | Status: SHIPPED | OUTPATIENT
Start: 2024-10-11

## 2024-10-11 RX ORDER — ACETAMINOPHEN 500 MG
1000 TABLET ORAL
Status: COMPLETED | OUTPATIENT
Start: 2024-10-11 | End: 2024-10-11

## 2024-10-11 RX ORDER — DIPHENHYDRAMINE HCL 25 MG
25 CAPSULE ORAL
Status: COMPLETED | OUTPATIENT
Start: 2024-10-11 | End: 2024-10-11

## 2024-10-11 RX ORDER — KETOROLAC TROMETHAMINE 10 MG/1
10 TABLET, FILM COATED ORAL
Status: COMPLETED | OUTPATIENT
Start: 2024-10-11 | End: 2024-10-11

## 2024-10-11 RX ORDER — NALOXEGOL OXALATE 12.5 MG/1
12.5 TABLET, FILM COATED ORAL DAILY
Qty: 14 TABLET | Refills: 0 | Status: SHIPPED | OUTPATIENT
Start: 2024-10-11 | End: 2024-10-25

## 2024-10-11 RX ADMIN — ACETAMINOPHEN 1000 MG: 500 TABLET ORAL at 08:10

## 2024-10-11 RX ADMIN — DIPHENHYDRAMINE HYDROCHLORIDE 25 MG: 25 CAPSULE ORAL at 05:10

## 2024-10-11 RX ADMIN — KETOROLAC TROMETHAMINE 10 MG: 10 TABLET, FILM COATED ORAL at 05:10

## 2024-10-11 NOTE — ED NOTES
Seen at [facility] pta today and urgent care yesterday for same complaints.. States wait was too long today at [facility]. UA done and resulted.   Latest Reference Range & Units 10/11/24 14:46   Color, UA Yellow, Light-Yellow, Colorless, Straw, Dark-Yellow  Light-Yellow   Appearance, UA Clear  Clear   Specific Gravity,UA 1.005 - 1.030  1.025   pH, UA 5.0 - 8.5  5.5   Protein, UA Negative  Trace !   Glucose, UA Negative, Normal  Normal   Ketones, UA Negative  Negative   Blood, UA Negative  Negative   NITRITE UA Negative  Negative   Bilirubin (UA) Negative  Negative   Urobilinogen, UA 0.2, 1.0, Normal  Normal   Leukocyte Esterase, UA Negative  250 !   RBC, UA None Seen, 0-2, 3-5, 0-5 /HPF 0-5   WBC, UA None Seen, 0-2, 3-5, 0-5 /HPF 6-10 !   Bacteria, UA None Seen, Trace /HPF Trace   Squamous Epithelial Cells, UA None Seen, Trace, Rare /HPF Occasional !   Hyaline Casts, UA None Seen /lpf 0-2 !   Mucous, UA None Seen /LPF Trace !   !: Data is abnormal

## 2024-10-11 NOTE — ED PROVIDER NOTES
Encounter Date: 10/11/2024       History     Chief Complaint   Patient presents with    Headache    Abdominal Pain     PT W CO ITCHING ALL OVER WITHOUT RASH, HA, RT FLANK PAIN W RAD TO BACK, MUSCLE CRAMPS IN LOWER LEGS AND LT ARM PAIN THAT RAD INTO CHEST X 4 DAYS.  SEEN AT  YESTERDAY WITHOUT IMPROVEMENT. EKG OBTAINED.  PT WENT TO [facility], STATED WAIT WAS TO LONG.     MUSCLE CRAMPS     Galina Walker is a 53 y.o. female with a PMHx of colon cancer (treated with colectomy), cervical stenosis, and HLD who presents to the ED with complaints of right sided abdominal pain/flank pain, itching all over her body, a right sided headache that started 4 day(s) ago. She reports she went to [facility] prior to coming here but the wait was too long. She reports she takes Norco as needed for her cervical stenosis.       The history is provided by the patient. No  was used.     Review of patient's allergies indicates:   Allergen Reactions    Ibuprofen Nausea Only     Past Medical History:   Diagnosis Date    C5-C7 ACDF 2023    Cervical stenosis of spinal canal 2023    Hyperlipidemia     Pituitary mass 2022     Past Surgical History:   Procedure Laterality Date     SECTION  2006    COLECTOMY  2017    COLONOSCOPY  2022    COLONOSCOPY  2017    [facility] [place], Rony Majano MD    HYSTERECTOMY      NECK SURGERY  2019    SPINE SURGERY  2023    C5-C7 ACDF    TUBAL LIGATION  2006     Family History   Problem Relation Name Age of Onset    Ovarian cancer Mother Galina Magallanes 48    Arthritis Father Galina Magallanes     Colon cancer Father Galina Magallanes         age 40     Social History     Tobacco Use    Smoking status: Every Day     Current packs/day: 0.50     Average packs/day: 0.6 packs/day for 33.4 years (19.7 ttl pk-yrs)     Types: Cigarettes, Vaping with nicotine     Start date:      Last attempt to quit:      Passive exposure: Current    Smokeless  tobacco: Never    Tobacco comments:     Pt smokes 5-10 cpd and vapes   Substance Use Topics    Alcohol use: Yes     Comment: socially    Drug use: Yes     Frequency: 2.0 times per week     Types: Marijuana     Review of Systems   Constitutional:  Negative for chills, fatigue and fever.   HENT:  Negative for congestion, ear pain, sinus pain and sore throat.    Eyes:  Negative for pain.   Respiratory:  Negative for cough, chest tightness and shortness of breath.    Cardiovascular:  Negative for chest pain.   Gastrointestinal:  Positive for abdominal pain and constipation. Negative for diarrhea, nausea and vomiting.   Genitourinary:  Negative for dysuria.   Musculoskeletal:  Negative for back pain and joint swelling.   Skin:  Negative for color change and rash.        itching   Neurological:  Positive for headaches. Negative for dizziness and weakness.   Psychiatric/Behavioral:  Negative for behavioral problems and confusion.        Physical Exam     Initial Vitals [10/11/24 1609]   BP Pulse Resp Temp SpO2   128/82 84 18 97.2 °F (36.2 °C) 100 %      MAP       --         Physical Exam    Nursing note and vitals reviewed.  Constitutional: She appears well-developed and well-nourished.   HENT:   Head: Normocephalic and atraumatic.   Nose: Nose normal.   Eyes: EOM are normal. Pupils are equal, round, and reactive to light.   Neck: Neck supple. No thyromegaly present. No JVD present.   Normal range of motion.  Cardiovascular:  Normal rate, regular rhythm, normal heart sounds and intact distal pulses.           No murmur heard.  Pulmonary/Chest: Breath sounds normal. No respiratory distress. She has no wheezes. She has no rhonchi. She has no rales. She exhibits no tenderness.   Abdominal: Abdomen is soft. Bowel sounds are normal. She exhibits no distension. There is no abdominal tenderness. There is no rebound and no guarding.   Musculoskeletal:         General: No tenderness or edema. Normal range of motion.      Cervical  back: Normal range of motion and neck supple.     Lymphadenopathy:     She has no cervical adenopathy.   Neurological: She is alert and oriented to person, place, and time.   Skin: Skin is warm and dry. Capillary refill takes less than 2 seconds.   Psychiatric: She has a normal mood and affect. Thought content normal.         ED Course   Procedures  Labs Reviewed   COMPREHENSIVE METABOLIC PANEL - Abnormal       Result Value    Sodium 140      Potassium 3.9      Chloride 112 (*)     CO2 17 (*)     Glucose 73 (*)     Blood Urea Nitrogen 21.2 (*)     Creatinine 0.80      Calcium 10.4 (*)     Protein Total 8.2      Albumin 4.6      Globulin 3.6 (*)     Albumin/Globulin Ratio 1.3      Bilirubin Total 0.5      ALP 68      ALT 18      AST 17      eGFR >60      Anion Gap 11.0      BUN/Creatinine Ratio 27     URINALYSIS, REFLEX TO URINE CULTURE - Abnormal    Color, UA Light-Yellow      Appearance, UA Clear      Specific Gravity, UA 1.027      pH, UA 5.5      Protein, UA Trace (*)     Glucose, UA Normal      Ketones, UA Negative      Blood, UA Negative      Bilirubin, UA Negative      Urobilinogen, UA Normal      Nitrites, UA Negative      Leukocyte Esterase, UA 25 (*)     RBC, UA 0-5      WBC, UA 6-10 (*)     Bacteria, UA Occasional      Squamous Epithelial Cells, UA Few      Mucous, UA Occ (*)     Hyaline Casts, UA None Seen     CBC WITH DIFFERENTIAL - Abnormal    WBC 5.68      RBC 4.42      Hgb 14.0      Hct 44.5      .7 (*)     MCH 31.7 (*)     MCHC 31.5 (*)     RDW 12.8      Platelet 159      MPV 10.7 (*)     IPF 4.5      Neut % 42.1      Lymph % 50.4      Mono % 4.9      Eos % 1.2      Basophil % 0.7      Lymph # 2.86      Neut # 2.39      Mono # 0.28      Eos # 0.07      Baso # 0.04      IG# 0.04      IG% 0.7      NRBC% 0.0     TROPONIN I - Normal    Troponin-I <0.010     MAGNESIUM - Normal    Magnesium Level 2.30     CBC W/ AUTO DIFFERENTIAL    Narrative:     The following orders were created for panel order  CBC Auto Differential.  Procedure                               Abnormality         Status                     ---------                               -----------         ------                     CBC with Differential[[id number]]       Abnormal            Final result                 Please view results for these tests on the individual orders.        ECG Results              EKG 12-lead (Chest Pain) Age >30 (In process)        Collection Time Result Time QRS Duration OHS QTC Calculation    10/11/24 16:14:03 10/11/24 16:15:23 84 420                     In process by Interface, Lab In [facility] (10/11/24 16:15:26)                   Narrative:    Test Reason : R07.9,    Vent. Rate : 073 BPM     Atrial Rate : 073 BPM     P-R Int : 146 ms          QRS Dur : 084 ms      QT Int : 382 ms       P-R-T Axes : 078 083 084 degrees     QTc Int : 420 ms    Normal sinus rhythm  Septal infarct ,age undetermined  Abnormal ECG  When compared with ECG of 14-JUN-2023 19:25,  No significant change was found    Referred By:             Confirmed By:                                   Imaging Results              X-Ray Abdomen Flat And Erect (Final result)  Result time 10/11/24 17:48:03      Final result by Jamey Cyr MD (10/11/24 17:48:03)                   Impression:      There is no abnormality seen      Electronically signed by: Alex Cyr  Date:    10/11/2024  Time:    17:48               Narrative:    EXAMINATION:  XR ABDOMEN FLAT AND ERECT    CLINICAL HISTORY:  constipation;    TECHNIQUE:  Flat and erect AP views of the abdomen were performed.    COMPARISON:  07/23/2024    FINDINGS:  The bowel gas pattern is nonspecific.  No free air seen.  No free fluid is seen.  No evidence of organomegaly is seen.  No abnormal calcifications are seen.  No bony abnormality is seen.                                       Medications   ketorolac tablet 10 mg (10 mg Oral Given 10/11/24 1713)   diphenhydrAMINE capsule 25 mg (25 mg Oral  Given 10/11/24 1713)   acetaminophen tablet 1,000 mg (1,000 mg Oral Given 10/11/24 2001)     Medical Decision Making  DDX: migraine, constipation, electrolyte disturbance, among others    Galina Walker is a 53 y.o. female with a PMHx of colon cancer (treated with colectomy), cervical stenosis, and HLD who presents to the ED with complaints of right sided abdominal pain/flank pain, itching all over her body, a right sided headache that started 4 day(s) ago. She reports she went to [facility] prior to coming here but the wait was too long. She reports she takes Norco as needed for her cervical stenosis.     Hospital Course: Labs ordered. CBC with stable anemia. CMP with slight electrolyte abnormalities. She has some constipation on xray, lucero admits to taking Norco frequently. Will DC home with Fioricet for her migraine and DC home with Movantik for her opioid induced constipation. She reports feeling much better after treatment in the ED. Stable for discharge. Strict return precautions given.     Amount and/or Complexity of Data Reviewed  Labs: ordered.  Radiology: ordered.    Risk  OTC drugs.  Prescription drug management.                                      Clinical Impression:  Final diagnoses:  [R07.9] Chest pain  [K59.03, T40.2X5A] Opioid-induced constipation (Primary)  [G43.909] Migraine without status migrainosus, not intractable, unspecified migraine type          ED Disposition Condition    Discharge Stable          ED Prescriptions       Medication Sig Dispense Start Date End Date Auth. Provider    butalbital-acetaminophen-caffeine -40 mg (FIORICET, ESGIC) -40 mg per tablet Take 1 tablet by mouth every 4 (four) hours as needed for Pain. 12 tablet 10/11/2024 -- Missy Dias PA-C    naloxegoL (MOVANTIK) 12.5 mg Tab Take 12.5 mg by mouth once daily. for 14 days 14 tablet 10/11/2024 10/25/2024 Missy Dias PA-C          Follow-up Information       Follow up With Specialties  Details Why Contact Info    Aisha Preston MD Family Medicine   [address] [address]  [phone]      [facility] Emergency [facility] Emergency Medicine In 3 days As needed, If symptoms worsen [address] [facility] [id number] [phone]             Missy Dias PA-C  10/11/24 210

## 2024-10-11 NOTE — FIRST PROVIDER EVALUATION
"Medical screening examination initiated.  I have conducted a focused provider triage encounter, findings are as follows:    Brief history of present illness:  arrived to ED due to headache, muscle cramping, and itching to R flank. Hx of colon ca, not currently being treated. LBM: today. Seen on UC on yesterday and XR abdomen revealed moderate amount of stool. Also c/o blurred vision.     Vitals:    10/11/24 1434   BP: 122/79   BP Location: Left arm   Pulse: 79   Resp: 16   Temp: 97.7 °F (36.5 °C)   TempSrc: Oral   SpO2: 100%   Weight: 76.2 kg (168 lb)   Height: 5' 9" (1.753 m)       Pertinent physical exam:  awake, alert, has non-labored breathing, in wheelchair    Brief workup plan:  labs & imaging    Preliminary workup initiated; this workup will be continued and followed by the physician or advanced practice provider that is assigned to the patient when roomed.  "

## 2024-10-12 LAB
OHS QRS DURATION: 84 MS
OHS QTC CALCULATION: 420 MS

## 2024-10-17 ENCOUNTER — TELEPHONE (OUTPATIENT)
Dept: SMOKING CESSATION | Facility: CLINIC | Age: 54
End: 2024-10-17
Payer: MEDICAID

## 2024-10-17 NOTE — TELEPHONE ENCOUNTER
Pt left a voice message stating that she would not be able to come into today for her smoking cessation appointment. Pt stated that she was not feeling well and was going to the emergency room. Will follow up with pt next week.

## 2024-10-25 ENCOUNTER — HOSPITAL ENCOUNTER (OUTPATIENT)
Dept: RADIOLOGY | Facility: HOSPITAL | Age: 54
Discharge: HOME | End: 2024-10-25
Attending: NURSE PRACTITIONER
Payer: MEDICAID

## 2024-10-25 DIAGNOSIS — Z12.31 SCREENING MAMMOGRAM FOR BREAST CANCER: ICD-10-CM

## 2024-10-25 PROCEDURE — 77067 SCR MAMMO BI INCL CAD: CPT | Mod: 26,,, | Performed by: STUDENT IN AN ORGANIZED HEALTH CARE EDUCATION/TRAINING PROGRAM

## 2024-10-25 PROCEDURE — 77063 BREAST TOMOSYNTHESIS BI: CPT | Mod: TC

## 2024-10-25 PROCEDURE — 77063 BREAST TOMOSYNTHESIS BI: CPT | Mod: 26,,, | Performed by: STUDENT IN AN ORGANIZED HEALTH CARE EDUCATION/TRAINING PROGRAM

## 2024-10-25 PROCEDURE — 77067 SCR MAMMO BI INCL CAD: CPT | Mod: TC

## 2024-10-30 ENCOUNTER — TELEPHONE (OUTPATIENT)
Dept: SMOKING CESSATION | Facility: CLINIC | Age: 54
End: 2024-10-30
Payer: MEDICAID

## 2024-11-18 ENCOUNTER — HOSPITAL ENCOUNTER (EMERGENCY)
Facility: HOSPITAL | Age: 54
Discharge: HOME | End: 2024-11-18
Attending: EMERGENCY MEDICINE
Payer: MEDICAID

## 2024-11-18 VITALS
BODY MASS INDEX: 26.77 KG/M2 | DIASTOLIC BLOOD PRESSURE: 84 MMHG | SYSTOLIC BLOOD PRESSURE: 148 MMHG | HEIGHT: 69 IN | OXYGEN SATURATION: 99 % | RESPIRATION RATE: 18 BRPM | HEART RATE: 78 BPM | TEMPERATURE: 98 F | WEIGHT: 180.75 LBS

## 2024-11-18 DIAGNOSIS — M25.562 CHRONIC PAIN OF LEFT KNEE: ICD-10-CM

## 2024-11-18 DIAGNOSIS — G90.522 COMPLEX REGIONAL PAIN SYNDROME TYPE 1 AFFECTING LEFT LOWER LEG: ICD-10-CM

## 2024-11-18 DIAGNOSIS — M54.12 CERVICAL RADICULOPATHY: Primary | ICD-10-CM

## 2024-11-18 DIAGNOSIS — G89.29 CHRONIC PAIN OF LEFT KNEE: ICD-10-CM

## 2024-11-18 DIAGNOSIS — R52 PAIN: ICD-10-CM

## 2024-11-18 PROCEDURE — 25000003 PHARM REV CODE 250

## 2024-11-18 PROCEDURE — 99284 EMERGENCY DEPT VISIT MOD MDM: CPT | Mod: 25

## 2024-11-18 RX ORDER — CYCLOBENZAPRINE HCL 10 MG
10 TABLET ORAL 3 TIMES DAILY PRN
Qty: 15 TABLET | Refills: 0 | Status: SHIPPED | OUTPATIENT
Start: 2024-11-18 | End: 2024-11-23

## 2024-11-18 RX ORDER — NAPROXEN 250 MG/1
500 TABLET ORAL
Status: COMPLETED | OUTPATIENT
Start: 2024-11-18 | End: 2024-11-18

## 2024-11-18 RX ORDER — CYCLOBENZAPRINE HCL 10 MG
10 TABLET ORAL
Status: COMPLETED | OUTPATIENT
Start: 2024-11-18 | End: 2024-11-18

## 2024-11-18 RX ADMIN — CYCLOBENZAPRINE 10 MG: 10 TABLET, FILM COATED ORAL at 09:11

## 2024-11-18 RX ADMIN — NAPROXEN 500 MG: 250 TABLET ORAL at 09:11

## 2024-11-18 NOTE — ED PROVIDER NOTES
"Encounter Date: 2024       History     Chief Complaint   Patient presents with    Arm Pain     Pt reports she had steroid injections to her posterior neck on Monday in [place]. Now having "shocking,tingling" pain down bilateral arms and from left knee to left foot. Awake,alert,ambulatory.      The patient is a 52 y/o female with PMH of cervical radiculopathy, HLD, Pituitiary mass, with complaints of bilateral arm tingling and left knee/ lower leg pain. Her arm tingling began after a procedure in her neck one week ago. She reports her radiculopathy was only her shoulders, her arms were not affected until after the procedure. Her left leg and knee pain has been intermittent for a year. Her PCP is aware of the left knee pain. This episode began after the procedure one week ago. Denies trauma, incontinence of bowel and bladder, weakness, fatigue. No other complaints    The history is provided by the patient.     Review of patient's allergies indicates:   Allergen Reactions    Ibuprofen Nausea Only     Past Medical History:   Diagnosis Date    C5-C7 ACDF 2023    Cervical stenosis of spinal canal 2023    Hyperlipidemia     Pituitary mass 2022     Past Surgical History:   Procedure Laterality Date     SECTION  2006    COLECTOMY  2017    COLONOSCOPY  2022    COLONOSCOPY  2017    [facility], Rony Majano MD    HYSTERECTOMY      NECK SURGERY  2019    SPINE SURGERY  2023    C5-C7 ACDF    TUBAL LIGATION  2006     Family History   Problem Relation Name Age of Onset    Ovarian cancer Mother Galina Magallanes 48    Arthritis Father Galina Magallanes     Colon cancer Father Galina Magallanes         age 40     Social History     Tobacco Use    Smoking status: Every Day     Current packs/day: 0.50     Average packs/day: 0.6 packs/day for 33.5 years (19.8 ttl pk-yrs)     Types: Cigarettes, Vaping with nicotine     Start date:      Last attempt to quit:      Passive " exposure: Current    Smokeless tobacco: Never    Tobacco comments:     Pt smokes 5-10 cpd and vapes   Substance Use Topics    Alcohol use: Yes     Comment: socially    Drug use: Yes     Frequency: 2.0 times per week     Types: Marijuana     Review of Systems   Constitutional: Negative.    HENT: Negative.     Eyes: Negative.    Respiratory: Negative.     Cardiovascular: Negative.    Gastrointestinal: Negative.    Genitourinary: Negative.    Musculoskeletal:  Positive for arthralgias, gait problem and myalgias. Negative for back pain, joint swelling, neck pain and neck stiffness.        Left knee pain with radiating down her left leg.   Bilateral arm tingling and muscle pain left > right   Skin: Negative.    All other systems reviewed and are negative.      Physical Exam     Initial Vitals [11/18/24 0831]   BP Pulse Resp Temp SpO2   (!) 150/91 78 18 97.6 °F (36.4 °C) 100 %      MAP       --         Physical Exam    Nursing note and vitals reviewed.  Constitutional: Vital signs are normal. She appears well-developed and well-nourished. She is not diaphoretic. She is cooperative.  Non-toxic appearance. She does not have a sickly appearance. She does not appear ill. No distress.   Nontoxic apperance   HENT:   Head: Normocephalic and atraumatic.   Right Ear: Hearing normal.   Left Ear: Hearing normal.   Nose: Nose normal. Mouth/Throat: Uvula is midline and oropharynx is clear and moist.   Eyes: Conjunctivae and EOM are normal. Pupils are equal, round, and reactive to light.   Neck: Trachea normal and phonation normal. Neck supple. No stridor present. No tracheal tenderness present.   Normal range of motion.  Cardiovascular:  Normal rate, regular rhythm, normal heart sounds, intact distal pulses and normal pulses.           Pulmonary/Chest: Effort normal and breath sounds normal. No accessory muscle usage or stridor. No tachypnea and no bradypnea. No respiratory distress. She has no decreased breath sounds. She has no  wheezes. She has no rhonchi. She has no rales.   Normal effort, symmetrical chest rise and fall, no accessory muscle use. Bilateral clear breath sounds in all fields anterior and posterior.      Abdominal: Abdomen is soft. Bowel sounds are normal. She exhibits no distension. There is no abdominal tenderness.   Abdomen is soft, nontender, no peritoneal signs. Tolerating PO fluids.      Musculoskeletal:      Right shoulder: Normal.      Left shoulder: Normal.      Right elbow: Normal.      Left elbow: Normal.      Cervical back: Normal, normal range of motion and neck supple. No edema, erythema or rigidity. No spinous process tenderness or muscular tenderness. Normal range of motion.      Thoracic back: Normal.      Lumbar back: Normal.      Right knee: Normal.      Left knee: Bony tenderness present. No swelling, deformity, effusion, erythema, ecchymosis, lacerations or crepitus. Decreased range of motion. Tenderness present. Normal alignment, normal meniscus and normal patellar mobility. Normal pulse.        Legs:       Comments: Left leg, knee TTP, limited ROM. Left lower leg is pink, warm, cap refill < 2 sec,  sensation intact.      Neurological: She is alert and oriented to person, place, and time. She has normal strength. GCS score is 15. GCS eye subscore is 4. GCS verbal subscore is 5. GCS motor subscore is 6.   Skin: Skin is warm, dry and intact. Capillary refill takes less than 2 seconds. No pallor.   Psychiatric: She has a normal mood and affect. Thought content normal.         ED Course   Procedures  Labs Reviewed - No data to display       Imaging Results              CT Cervical Spine Without Contrast (Final result)  Result time 11/18/24 10:54:07      Final result by Alisha Tolbert MD (11/18/24 10:54:07)                   Impression:      1. No acute fracture identified.  2. Postoperative changes of the cervical spine.      Electronically signed by: Alisha  Didi  Date:    11/18/2024  Time:    10:54               Narrative:    EXAMINATION:  CT CERVICAL SPINE WITHOUT CONTRAST    CLINICAL HISTORY:  Neck pain, acute, prior cervical surgery;    TECHNIQUE:  Noncontrast CT images of the cervical spine. Axial, coronal, and sagittal reformatted images were obtained. Dose length product is 309 mGycm. Automatic exposure control, adjustment of mA/kV or iterative reconstruction technique was used to limit radiation dose.    COMPARISON:  X-rays dated 08/05/2024    FINDINGS:  The cervical spine is visualized through the level of T2.    There are postoperative changes with anterior fusion hardware at C4-C5 and C5-C7.  The hardware is intact.  There is no acute fracture identified.  There is no paraspinal hematoma.                                       X-Ray Knee 3 View Left (Final result)  Result time 11/18/24 10:47:07      Final result by Alisha Tolbert MD (11/18/24 10:47:07)                   Impression:      No acute bony abnormality.      Electronically signed by: Alisha Tolbert  Date:    11/18/2024  Time:    10:47               Narrative:    EXAMINATION:  XR KNEE 3 VIEW LEFT    CLINICAL HISTORY:  Pain, unspecified    COMPARISON:  None.    FINDINGS:  There is no acute fracture or malalignment.  There is no knee joint effusion.                                       Medications   cyclobenzaprine tablet 10 mg (10 mg Oral Given 11/18/24 0935)   naproxen tablet 500 mg (500 mg Oral Given 11/18/24 0935)     Medical Decision Making  Fracture, septic joint, cellulitis, abscess, gout, RA, OA among others  Lumbar fracture, spinal stenosis, epidural abscess, spine osteomyelitis, MS, cauda equina, among others    Radiology  Cervical CT no acute abnormalities  Left knee xray- no acute abnormalities    Chronic cervical radiculopathy, treated in [facility] one week ago with steroid injection and fusion. She also has a history of degenerative changes to lumbar region. No previous history of  knee pain. She missed her follow up with her surgeon this morning due to transportation issues. She is not as concerned with her bilateral arm pain, her main concern is the left lower extremity. She does report this has been intermittent for a year, she has told her PCP about her complaint. She denies injury, fever, chest pain, sob, incontinence of bowel and bladder. Highly encouraged her to follow up with her surgeon. Educated her left lower knee and leg pain could be degenerative changes in the knee or her back. She is encouraged to follow up with her PCP for additional testing if her symptoms persist. ER precautions discussed.  Patient is nontoxic appearing, no acute distress, stable vital signs.   The patient is resting comfortably in no acute distress.  hemodynamically stable and is without objective evidence for acute process requiring urgent intervention or hospitalization. I provided counseling to patient with regard to condition, the treatment plan, specific conditions for return, and the importance of follow up. Detailed written and verbal instructions provided to patient and he expressed a verbal understanding of the discharge instructions and overall management plan. Reiterated the importance of medication administration and safety and advised patient to follow up with primary care provider in 3-5 days or sooner if needed. Answered questions at this time. The patient is stable for discharge.       Amount and/or Complexity of Data Reviewed  External Data Reviewed: radiology.     Details: 10/2022 lumbar CT  Intervertebral disc spaces:Moderately decreased disc height is seen at L5-S1. There is air pockets within the intervertebral disc.     Osteophytes:There are degenerative marginal osteophytes at T12 down through S1.     Facet degenerative changes:Moderate facet degenerative changes are seen at L4-L5 L5-S1.    Radiology: ordered. Decision-making details documented in ED Course.    Risk  Prescription drug  management.               ED Course as of 11/18/24 1543   Mon Nov 18, 2024   1058 CT Cervical Spine Without Contrast [RQ]   1058 CT Cervical Spine Without Contrast  There are postoperative changes with anterior fusion hardware at C4-C5 and C5-C7.  The hardware is intact.  There is no acute fracture identified.  There is no paraspinal hematoma.     Impression:     1. No acute fracture identified.  2. Postoperative changes of the cervical spine.         [RQ]   1058 X-Ray Knee 3 View Left [RQ]   1058 X-Ray Knee 3 View Left  FINDINGS:  There is no acute fracture or malalignment.  There is no knee joint effusion.     Impression:     No acute bony abnormality.   [RQ]      ED Course User Index  [RQ] Cassi Stokes FNP                           Clinical Impression:  Final diagnoses:  [R52] Pain  [M54.12] Cervical radiculopathy (Primary)  [M25.562, G89.29] Chronic pain of left knee  [G90.522] Complex regional pain syndrome type 1 affecting left lower leg          ED Disposition Condition    Discharge Stable          ED Prescriptions       Medication Sig Dispense Start Date End Date Auth. Provider    cyclobenzaprine (FLEXERIL) 10 MG tablet Take 1 tablet (10 mg total) by mouth 3 (three) times daily as needed for Muscle spasms. 15 tablet 11/18/2024 11/23/2024 Cassi Stokes FNP          Follow-up Information       Follow up With Specialties Details Why Contact Info    Aisha Preston MD Family Medicine In 3 days  [address] [facility] [address]  [phone]      [facility] Emergency Medicine  If symptoms worsen [address] [facility] [id number] [phone]             Cassi Stokes FNP  11/18/24 1543

## 2024-11-18 NOTE — DISCHARGE INSTRUCTIONS
Rest Ice Compression Elevate  Follow up with doctor in [place] as discussed  Follow up with PCP about left lower extremity pain  Rotate tylenol and naproxen for pain

## 2024-11-19 ENCOUNTER — PATIENT MESSAGE (OUTPATIENT)
Dept: RESEARCH | Facility: HOSPITAL | Age: 54
End: 2024-11-19
Payer: MEDICAID

## 2024-11-25 ENCOUNTER — OFFICE VISIT (OUTPATIENT)
Dept: FAMILY MEDICINE | Facility: CLINIC | Age: 54
End: 2024-11-25
Payer: MEDICAID

## 2024-11-25 VITALS
SYSTOLIC BLOOD PRESSURE: 128 MMHG | WEIGHT: 178 LBS | RESPIRATION RATE: 18 BRPM | OXYGEN SATURATION: 98 % | BODY MASS INDEX: 26.36 KG/M2 | DIASTOLIC BLOOD PRESSURE: 84 MMHG | TEMPERATURE: 98 F | HEART RATE: 78 BPM | HEIGHT: 69 IN

## 2024-11-25 DIAGNOSIS — Z23 IMMUNIZATION DUE: ICD-10-CM

## 2024-11-25 DIAGNOSIS — G43.719 INTRACTABLE CHRONIC MIGRAINE WITHOUT AURA AND WITHOUT STATUS MIGRAINOSUS: Primary | ICD-10-CM

## 2024-11-25 DIAGNOSIS — H05.20 EXOPHTHALMOS: ICD-10-CM

## 2024-11-25 DIAGNOSIS — E23.6 PITUITARY MASS: ICD-10-CM

## 2024-11-25 PROCEDURE — 99214 OFFICE O/P EST MOD 30 MIN: CPT | Mod: S$PBB,,, | Performed by: FAMILY MEDICINE

## 2024-11-25 PROCEDURE — 3074F SYST BP LT 130 MM HG: CPT | Mod: CPTII,,, | Performed by: FAMILY MEDICINE

## 2024-11-25 PROCEDURE — 90656 IIV3 VACC NO PRSV 0.5 ML IM: CPT | Mod: PBBFAC

## 2024-11-25 PROCEDURE — 99215 OFFICE O/P EST HI 40 MIN: CPT | Mod: PBBFAC | Performed by: FAMILY MEDICINE

## 2024-11-25 PROCEDURE — 90471 IMMUNIZATION ADMIN: CPT | Mod: PBBFAC

## 2024-11-25 PROCEDURE — 1159F MED LIST DOCD IN RCRD: CPT | Mod: CPTII,,, | Performed by: FAMILY MEDICINE

## 2024-11-25 PROCEDURE — 3079F DIAST BP 80-89 MM HG: CPT | Mod: CPTII,,, | Performed by: FAMILY MEDICINE

## 2024-11-25 PROCEDURE — 3008F BODY MASS INDEX DOCD: CPT | Mod: CPTII,,, | Performed by: FAMILY MEDICINE

## 2024-11-25 PROCEDURE — 1160F RVW MEDS BY RX/DR IN RCRD: CPT | Mod: CPTII,,, | Performed by: FAMILY MEDICINE

## 2024-11-25 RX ORDER — BUTALBITAL, ACETAMINOPHEN AND CAFFEINE 50; 325; 40 MG/1; MG/1; MG/1
1 TABLET ORAL EVERY 4 HOURS PRN
Qty: 12 TABLET | Refills: 0 | Status: SHIPPED | OUTPATIENT
Start: 2024-11-25

## 2024-11-25 RX ORDER — TRAZODONE HYDROCHLORIDE 50 MG/1
50 TABLET ORAL NIGHTLY
COMMUNITY

## 2024-11-25 RX ADMIN — INFLUENZA VIRUS VACCINE 0.5 ML: 15; 15; 15 SUSPENSION INTRAMUSCULAR at 01:11

## 2024-11-25 NOTE — PROGRESS NOTES
"Patient Name: Galina Walker   : 1970  MRN: [id number]     SUBJECTIVE:  Galina Walker is a 53 y.o. female here for Follow-up (The patient states that her migraines are back and would like to discuss MRI for pituitary tumor located on the left side of the nasal cavity. Also, wants to check her thyroids.)  .    HPI  Here for above issues  Last visit increased Lexapro to 20 mg daily and added BuSpar 7.5 mg 3 times a day.   Seeing a counselor/psychiatrist. She was instead increased dose of buspar to 10 mg and discontinued lexapro by them.  Was also started on trazodone 50 mg nightly.  Patient has appointment with them next week and she will talk to them about maybe resuming Lexapro, defer to them.      We referred patient to neurosurgery last year and she did see them in 2023 for numbness and tingling and pituitary mass.  Reviewed note, they are just monitoring pituitary mass.  Patient states that she did not go back to them though and has not seen a neurosurgeon since last year.  Follows with Orthopedic surgery/pain medicine for her chronic neck pain and radiculopathy.  Reviewing last MRI of the brain in  showing:  IMPRESSION:   1. Dynamic scan through the pituitary was performed WITHOUT contrast,  and therefore unable to exclude microadenoma on the basis of this  exam.   2. Grossly stable post contrast enhanced appearance of the pituitary."    patient does have migraines which are usually right-sided, bit more frequent lately.  Baseline vision issues.  Used to follow with Ophthalmology, needs to reschedule    History of hyperthyroidism.  Last TSH normal and it has been normal for the past 6 years.  Does have ey bulging though and chart reviewing it was mentioned thyrotoxicosis years ago.      ALLERGIES:   Review of patient's allergies indicates:   Allergen Reactions    Ibuprofen Nausea Only         ROS:  Review of Systems   Constitutional:  Negative for chills, fever and weight loss.   HENT:  " "Negative for congestion.    Respiratory:  Negative for cough and shortness of breath.    Cardiovascular:  Negative for chest pain, palpitations and leg swelling.   Gastrointestinal:  Negative for abdominal pain, blood in stool, diarrhea, nausea and vomiting.   Genitourinary:  Negative for dysuria and hematuria.   Musculoskeletal:  Positive for back pain and neck pain.        Chronic intermittent neck and back pain   Neurological:  Positive for headaches. Negative for dizziness.   Psychiatric/Behavioral:  Negative for depression. The patient is nervous/anxious.          OBJECTIVE:  Vital signs  Vitals:    11/25/24 1306   BP: 128/84   Pulse: 78   Resp: 18   Temp: 98.2 °F (36.8 °C)   TempSrc: Oral   SpO2: 98%   Weight: 80.7 kg (178 lb)   Height: 5' 9" (1.753 m)      Body mass index is 26.29 kg/m².    PHYSICAL EXAM:   Physical Exam  Vitals reviewed.   Constitutional:       General: She is not in acute distress.     Appearance: Normal appearance. She is not ill-appearing.   HENT:      Head: Normocephalic and atraumatic.      Right Ear: External ear normal.      Left Ear: External ear normal.      Nose: Nose normal. No rhinorrhea.      Mouth/Throat:      Mouth: Mucous membranes are moist.   Eyes:      General: No scleral icterus.        Right eye: No discharge.         Left eye: No discharge.      Conjunctiva/sclera: Conjunctivae normal.      Pupils: Pupils are equal, round, and reactive to light.   Cardiovascular:      Rate and Rhythm: Normal rate and regular rhythm.   Pulmonary:      Effort: No respiratory distress.      Breath sounds: No wheezing, rhonchi or rales.   Abdominal:      General: Bowel sounds are normal. There is no distension.      Palpations: Abdomen is soft.      Tenderness: There is no abdominal tenderness.   Musculoskeletal:      Cervical back: Normal range of motion.      Right lower leg: No edema.      Left lower leg: No edema.   Skin:     General: Skin is warm.      Coloration: Skin is not pale.     "  Findings: No rash.   Neurological:      General: No focal deficit present.      Mental Status: She is alert and oriented to person, place, and time.      Sensory: No sensory deficit.      Motor: No weakness.   Psychiatric:         Mood and Affect: Mood normal.         Behavior: Behavior normal.          ASSESSMENT/PLAN:  1. Intractable chronic migraine without aura and without status migrainosus  Fioricet used to help in the past.  Continue with it for abortive treatment.  Check MRI of the brain having more frequent headaches and history of pituitary mass.  Refer to Neurosurgery.  Used to follow with them for this issue.  Stable neuro exam and no other symptoms other than frequent headaches.  -     Ambulatory referral/consult to Neurosurgery; Future; Expected date: 12/02/2024  -     butalbital-acetaminophen-caffeine -40 mg (FIORICET, ESGIC) -40 mg per tablet; Take 1 tablet by mouth every 4 (four) hours as needed for Pain.  Dispense: 12 tablet; Refill: 0  -     MRI Brain W WO Contrast; Future; Expected date: 11/25/2024    2. Pituitary mass  See 1.  MRI of the brain ordered.  -     Ambulatory referral/consult to Neurosurgery; Future; Expected date: 12/02/2024  -     MRI Brain W WO Contrast; Future; Expected date: 11/25/2024    3. Exophthalmos  Further workup with thyroid levels and ultrasound of the thyroid.  -     TSH; Future; Expected date: 11/25/2024  -     T4, Free; Future; Expected date: 11/25/2024  -     US Soft Tissue Head Neck; Future; Expected date: 11/25/2024    4. Immunization due  -     influenza (Flulaval, Fluzone, Fluarix) 45 mcg/0.5 mL IM vaccine (> or = 6 mo) 0.5 mL             Previous medical history/lab work/radiology reviewed and considered during medical management decisions.   Medication list reviewed and medication reconciliation performed.  Patient was provided  and care about his/her current diagnosis (es) and medications including risk/benefit and side effects/adverse  events, over the counter medication uses/doses, home self-care and contact precautions,  and red flags and indications for when to seek immediate medical attention.   Patient was advised to continue compliance with current medication list and medical recommendations.  Recommended/ Advised continued compliance with recommended eating habits/ diets for medical conditions and exercise 150 minutes/ week (if possible) for medical condition (s).        RESULTS:  No results found for this or any previous visit (from the past 6 weeks).      Follow Up:  Follow up in about 6 months (around 5/25/2025).     [unfilled]    This note was created with the assistance of a voice recognition software or phone dictation. There may be transcription errors as a result of using this technology however minimal. Effort has been made to assure accuracy of transcription but any obvious errors or omissions should be clarified with the author of the document

## 2024-11-27 ENCOUNTER — PATIENT MESSAGE (OUTPATIENT)
Dept: RESEARCH | Facility: HOSPITAL | Age: 54
End: 2024-11-27
Payer: MEDICAID

## 2024-12-02 ENCOUNTER — TELEPHONE (OUTPATIENT)
Dept: NEUROSURGERY | Facility: CLINIC | Age: 54
End: 2024-12-02
Payer: MEDICAID

## 2024-12-02 NOTE — TELEPHONE ENCOUNTER
53 year old patient referred to clinic by Dr. Aisha Preston for a pituitary mass and Intractable chronic migraine without aura and without status migrainosus.

## 2024-12-03 ENCOUNTER — TELEPHONE (OUTPATIENT)
Dept: FAMILY MEDICINE | Facility: CLINIC | Age: 54
End: 2024-12-03
Payer: MEDICAID

## 2024-12-13 ENCOUNTER — HOSPITAL ENCOUNTER (OUTPATIENT)
Dept: RADIOLOGY | Facility: HOSPITAL | Age: 54
Discharge: HOME | End: 2024-12-13
Attending: FAMILY MEDICINE
Payer: MEDICAID

## 2024-12-13 ENCOUNTER — CLINICAL SUPPORT (OUTPATIENT)
Dept: FAMILY MEDICINE | Facility: CLINIC | Age: 54
End: 2024-12-13
Attending: FAMILY MEDICINE
Payer: MEDICAID

## 2024-12-13 DIAGNOSIS — H05.20 EXOPHTHALMOS: ICD-10-CM

## 2024-12-13 DIAGNOSIS — E23.6 PITUITARY MASS: ICD-10-CM

## 2024-12-13 DIAGNOSIS — Z23 IMMUNIZATION DUE: Primary | ICD-10-CM

## 2024-12-13 DIAGNOSIS — G43.719 INTRACTABLE CHRONIC MIGRAINE WITHOUT AURA AND WITHOUT STATUS MIGRAINOSUS: ICD-10-CM

## 2024-12-13 PROCEDURE — 76536 US EXAM OF HEAD AND NECK: CPT | Mod: TC

## 2024-12-13 PROCEDURE — 25500020 PHARM REV CODE 255

## 2024-12-13 PROCEDURE — 70553 MRI BRAIN STEM W/O & W/DYE: CPT | Mod: TC

## 2024-12-13 PROCEDURE — 90471 IMMUNIZATION ADMIN: CPT | Mod: PBBFAC

## 2024-12-13 PROCEDURE — A9577 INJ MULTIHANCE: HCPCS

## 2024-12-13 PROCEDURE — 90750 HZV VACC RECOMBINANT IM: CPT | Mod: PBBFAC

## 2024-12-13 PROCEDURE — 99211 OFF/OP EST MAY X REQ PHY/QHP: CPT | Mod: PBBFAC

## 2024-12-13 RX ADMIN — GADOBENATE DIMEGLUMINE 17 ML: 529 INJECTION, SOLUTION INTRAVENOUS at 12:12

## 2024-12-13 RX ADMIN — ZOSTER VACCINE RECOMBINANT, ADJUVANTED 0.5 ML: KIT at 01:12

## 2024-12-13 NOTE — PROGRESS NOTES
The MRI of the brain did not show any acute changes.  Did not greatly evaluate the pituitary gland though because that needs a dedicated pituitary protocol MRI that Neurosurgery can order it as we referred last visit

## 2024-12-13 NOTE — PROGRESS NOTES
Please let the patient know that ultrasound of the thyroid showed multiple very small nodules , but none of them meets criteria for biopsy and/or surveillance.

## 2024-12-16 ENCOUNTER — TELEPHONE (OUTPATIENT)
Dept: FAMILY MEDICINE | Facility: CLINIC | Age: 54
End: 2024-12-16
Payer: MEDICAID

## 2024-12-16 NOTE — TELEPHONE ENCOUNTER
Called patient via phone call and patient understands results given. No further questions at this time.     ----- Message from Aisha Preston MD sent at 12/13/2024  3:27 PM CST -----  Please let the patient know that ultrasound of the thyroid showed multiple very small nodules , but none of them meets criteria for biopsy and/or surveillance.

## 2024-12-16 NOTE — TELEPHONE ENCOUNTER
Called patient via phone call and patient understands results given. No further questions at this time.     ----- Message from Aisha Preston MD sent at 12/13/2024  3:28 PM CST -----  The MRI of the brain did not show any acute changes.  Did not greatly evaluate the pituitary gland though because that needs a dedicated pituitary protocol MRI that Neurosurgery can order it as we referred last visit

## 2024-12-19 ENCOUNTER — TELEPHONE (OUTPATIENT)
Dept: SMOKING CESSATION | Facility: CLINIC | Age: 54
End: 2024-12-19
Payer: MEDICAID

## 2025-01-30 ENCOUNTER — OFFICE VISIT (OUTPATIENT)
Dept: GYNECOLOGY | Facility: CLINIC | Age: 55
End: 2025-01-30
Payer: MEDICAID

## 2025-01-30 VITALS
WEIGHT: 189 LBS | OXYGEN SATURATION: 100 % | DIASTOLIC BLOOD PRESSURE: 82 MMHG | SYSTOLIC BLOOD PRESSURE: 134 MMHG | BODY MASS INDEX: 27.99 KG/M2 | TEMPERATURE: 98 F | HEART RATE: 82 BPM | HEIGHT: 69 IN | RESPIRATION RATE: 18 BRPM

## 2025-01-30 DIAGNOSIS — N94.10 DYSPAREUNIA IN FEMALE: ICD-10-CM

## 2025-01-30 DIAGNOSIS — Z01.419 WOMEN'S ANNUAL ROUTINE GYNECOLOGICAL EXAMINATION: Primary | ICD-10-CM

## 2025-01-30 DIAGNOSIS — N39.46 MIXED STRESS AND URGE URINARY INCONTINENCE: ICD-10-CM

## 2025-01-30 DIAGNOSIS — Z11.3 ROUTINE SCREENING FOR STI (SEXUALLY TRANSMITTED INFECTION): ICD-10-CM

## 2025-01-30 DIAGNOSIS — Z12.31 SCREENING MAMMOGRAM FOR BREAST CANCER: ICD-10-CM

## 2025-01-30 LAB
BILIRUB SERPL-MCNC: NEGATIVE MG/DL
BLOOD URINE, POC: NEGATIVE
C TRACH DNA SPEC QL NAA+PROBE: NOT DETECTED
CLUE CELLS VAG QL WET PREP: NORMAL
COLOR, POC UA: YELLOW
GLUCOSE UR QL STRIP: NEGATIVE
KETONES UR QL STRIP: NEGATIVE
LEUKOCYTE ESTERASE URINE, POC: NEGATIVE
N GONORRHOEA DNA SPEC QL NAA+PROBE: NOT DETECTED
NITRITE, POC UA: NEGATIVE
PH, POC UA: 5.5
PROTEIN, POC: NEGATIVE
SOURCE (OHS): NORMAL
SPECIFIC GRAVITY, POC UA: 1.02
T VAGINALIS VAG QL WET PREP: NORMAL
UROBILINOGEN, POC UA: 0.2
WBC #/AREA VAG WET PREP: NORMAL
YEAST SPEC QL WET PREP: NORMAL

## 2025-01-30 PROCEDURE — 87210 SMEAR WET MOUNT SALINE/INK: CPT | Performed by: NURSE PRACTITIONER

## 2025-01-30 PROCEDURE — 1159F MED LIST DOCD IN RCRD: CPT | Mod: CPTII,,, | Performed by: NURSE PRACTITIONER

## 2025-01-30 PROCEDURE — 99215 OFFICE O/P EST HI 40 MIN: CPT | Mod: PBBFAC | Performed by: NURSE PRACTITIONER

## 2025-01-30 PROCEDURE — 1160F RVW MEDS BY RX/DR IN RCRD: CPT | Mod: CPTII,,, | Performed by: NURSE PRACTITIONER

## 2025-01-30 PROCEDURE — 3008F BODY MASS INDEX DOCD: CPT | Mod: CPTII,,, | Performed by: NURSE PRACTITIONER

## 2025-01-30 PROCEDURE — 99213 OFFICE O/P EST LOW 20 MIN: CPT | Mod: 25,S$PBB,, | Performed by: NURSE PRACTITIONER

## 2025-01-30 PROCEDURE — 87491 CHLMYD TRACH DNA AMP PROBE: CPT | Performed by: NURSE PRACTITIONER

## 2025-01-30 PROCEDURE — 3079F DIAST BP 80-89 MM HG: CPT | Mod: CPTII,,, | Performed by: NURSE PRACTITIONER

## 2025-01-30 PROCEDURE — 3075F SYST BP GE 130 - 139MM HG: CPT | Mod: CPTII,,, | Performed by: NURSE PRACTITIONER

## 2025-01-30 PROCEDURE — 99396 PREV VISIT EST AGE 40-64: CPT | Mod: S$PBB,,, | Performed by: NURSE PRACTITIONER

## 2025-01-30 PROCEDURE — 81001 URINALYSIS AUTO W/SCOPE: CPT | Mod: PBBFAC | Performed by: NURSE PRACTITIONER

## 2025-01-30 NOTE — PROGRESS NOTES
"Patient ID: Galina Walker is a 54 y.o. female.    Chief Complaint: Gynecologic Exam            HPI:  Pt is  here for annual gyn exam. Denies hx of abnormal pap smear .Pt is s/p RA-TLH/BSO in  performed by Gyn oncology in [place] d/t pelvic mass that was concerning for leiomyosarcoma but found to be benign uterine fibroids.  PMHx-SBO (treated with bowel rest/NGT), migraine, colon cancer (no chemo/radiation)-states is followed by Dr. Roberts for screening, schizophrenia  PSHx-colon resection, TL, hysterectomy with BSO  Family Hx- mother with hx ovarian cancer in her late 40s; aunt and cousin with breast cancer    Today,pt has multiple complaints. Pt c/o dyspareunia and severe hot flashes since hysterectomy. She was given estradiol patches and premarin vaginal cream but stopped both after radiologist informed her of recent changes (increased density) on her mammogram that he believed was associated with HRT. She has tried otc options with no improvement. She denies vaginal dryness. Pt also c/o mixed urinary incontinence and frequency. She was referred to urology in the past but states it has been over a year since her last visit. Pt has decreased her caffeine intake as instructed but states still drinks 3 cups of coffee per week and two 24oz of beer weekly. Pt has never been to pelvic floor PT.   Review of Systems:   Negative except for findings in HPI     Objective:   /82   Pulse 82   Temp 98.3 °F (36.8 °C) (Oral)   Resp 18   Ht 5' 9" (1.753 m)   Wt 85.7 kg (189 lb)   SpO2 100%   BMI 27.91 kg/m²    Physical Exam:  GENERAL: Pt is aware and alert and  in no acute distress.  BREASTS: Bilateral-No masses, nipple discharge, skin changes, or tenderness.  ABDOMEN: Soft, non tender.  VULVA:  No lesions or skin changes.  URETHRA: No lesions  BLADDER: + tenderness.  VAGINA: Mucosa normal,no discharge; no lesions.  CERVIX:  absent  BIMANUAL EXAM: . The uterus is absent. Jack adnexa reveal no " evidence of masses; no fullness; +tenderness to left levator ani  SKIN: Warm and Dry  PSYCHIATRIC: Patient is awake and alert. Mood and affect are normal.    Assessment:   Women's annual routine gynecological examination    Dyspareunia in female  -     POCT urinalysis, dipstick or tablet reag  -     Ambulatory referral/consult to Pelvic Medicine; Future; Expected date: 02/06/2025    Mixed stress and urge urinary incontinence  -     POCT urinalysis, dipstick or tablet reag  -     Ambulatory referral/consult to Urology; Future; Expected date: 02/06/2025  -     Ambulatory referral/consult to Pelvic Medicine; Future; Expected date: 02/06/2025    Routine screening for STI (sexually transmitted infection)  -     Hepatitis B Surface Antigen; Future; Expected date: 01/30/2025  -     Hepatitis C Antibody; Future; Expected date: 01/30/2025  -     Chlamydia/GC, PCR  -     Wet Prep, Genital  -     SYPHILIS ANTIBODY (WITH REFLEX RPR); Future; Expected date: 01/30/2025  -     HIV 1/2 Ag/Ab (4th Gen); Future; Expected date: 01/30/2025    Screening mammogram for breast cancer  -     Mammo Digital Screening Bilat w/ Gerald; Future; Expected date: 11/11/2025            1. Women's annual routine gynecological examination    2. Dyspareunia in female    3. Mixed stress and urge urinary incontinence    4. Routine screening for STI (sexually transmitted infection)    5. Screening mammogram for breast cancer             -pap not indicated secondary to hysterectomy for benign conditions  -refer back to gyn for hot flashes (stopped HRT, pt on lyrica for schizophrenia therefore will avoid SSRI/SNRI)  -refer to urology for questionable IC  -refer to pelvic floor PT  Plan:       Follow up in about 1 year (around 1/30/2026).

## 2025-02-14 ENCOUNTER — TELEPHONE (OUTPATIENT)
Dept: GYNECOLOGY | Facility: CLINIC | Age: 55
End: 2025-02-14
Payer: MEDICAID

## 2025-02-14 NOTE — TELEPHONE ENCOUNTER
Referral status request faxed to [facility]. Fax confirmation will be scanned into patients chart once received.

## 2025-03-13 ENCOUNTER — OFFICE VISIT (OUTPATIENT)
Dept: UROLOGY | Facility: CLINIC | Age: 55
End: 2025-03-13
Payer: MEDICAID

## 2025-03-13 ENCOUNTER — CLINICAL SUPPORT (OUTPATIENT)
Dept: SMOKING CESSATION | Facility: CLINIC | Age: 55
End: 2025-03-13
Payer: MEDICAID

## 2025-03-13 VITALS
HEIGHT: 69 IN | RESPIRATION RATE: 18 BRPM | DIASTOLIC BLOOD PRESSURE: 91 MMHG | WEIGHT: 202.81 LBS | BODY MASS INDEX: 30.04 KG/M2 | HEART RATE: 86 BPM | TEMPERATURE: 98 F | SYSTOLIC BLOOD PRESSURE: 150 MMHG | OXYGEN SATURATION: 100 %

## 2025-03-13 DIAGNOSIS — F17.200 NICOTINE DEPENDENCE: Primary | ICD-10-CM

## 2025-03-13 DIAGNOSIS — R39.89 BLADDER PAIN: ICD-10-CM

## 2025-03-13 DIAGNOSIS — N32.81 OAB (OVERACTIVE BLADDER): ICD-10-CM

## 2025-03-13 DIAGNOSIS — R35.1 NOCTURIA: Primary | ICD-10-CM

## 2025-03-13 LAB
BILIRUB SERPL-MCNC: NEGATIVE MG/DL
BLOOD URINE, POC: NEGATIVE
COLOR, POC UA: YELLOW
GLUCOSE UR QL STRIP: NEGATIVE
KETONES UR QL STRIP: NEGATIVE
LEUKOCYTE ESTERASE URINE, POC: NEGATIVE
NITRITE, POC UA: NEGATIVE
PH, POC UA: 5.5
PROTEIN, POC: NEGATIVE
SPECIFIC GRAVITY, POC UA: 1.02
UROBILINOGEN, POC UA: 0.2

## 2025-03-13 PROCEDURE — 99999 PR PBB SHADOW E&M-EST. PATIENT-LVL I: CPT | Mod: PBBFAC,,,

## 2025-03-13 PROCEDURE — 81001 URINALYSIS AUTO W/SCOPE: CPT | Mod: PBBFAC | Performed by: NURSE PRACTITIONER

## 2025-03-13 PROCEDURE — 99215 OFFICE O/P EST HI 40 MIN: CPT | Mod: PBBFAC | Performed by: NURSE PRACTITIONER

## 2025-03-13 RX ORDER — MIRABEGRON 50 MG/1
50 TABLET, FILM COATED, EXTENDED RELEASE ORAL DAILY
Qty: 90 TABLET | Refills: 3 | Status: SHIPPED | OUTPATIENT
Start: 2025-03-13 | End: 2026-03-13

## 2025-03-13 NOTE — PROGRESS NOTES
Patient seen in clinic by Magdi Rivera.  Will begin bladder instillations for 8 weeks and follow up in clinic in 2 months.

## 2025-03-13 NOTE — PROGRESS NOTES
Chief Complaint:   Chief Complaint   Patient presents with    Follow up urinary incontinence       HPI:   Patient is a 53-year-old female follow-up for nocturia.   Patient seen by PCP for recurrent nocturia and urinary frequency 6-7 times per night. Patient admits to drinking over 2 posterior coffee per day and does not subside and drinking 1 hour prior to bedtime however she does void before bedtime and avoid drinking any fluids at night.   Today patient complaining of consistent bladder pain and urinary frequency 10 times during the day in 6-7 times at night.  Patient did not start mirabegron 25 mg p.o. daily as initiated on last appointment.  Patient stated she was examined by her OBGYN and her assessment was of an inflamed bladder.  Discussed with patient of UAs consistently negative for any bacteria although she is having persistent bladder pain discussed with her of bladder instillations can causing of lidocaine, heparin, prednisone, sodium bicarb weekly x8 weeks patient agreed to initiate tomorrow.  We will initiate mirabegron 50 mg p.o. daily.    Allergies:  Review of patient's allergies indicates:   Allergen Reactions    Ibuprofen Nausea Only       Medications:  Current Medications[1]    Review of Systems:  General: No fever, chills, fatigability, or weight loss.  Skin: No rashes, itching, or changes in color or texture of skin.  Chest: Denies ANDERSEN, cyanosis, wheezing, cough, and sputum production.  Abdomen: Appetite fine. No weight loss. Denies diarrhea, abdominal pain, hematemesis, or blood in stool.  Musculoskeletal: No joint stiffness or swelling. Denies back pain.  : As above.  All other review of systems negative.    PMH:  Past Medical History:   Diagnosis Date    C5-C7 ACDF 05/24/2023    Cervical stenosis of spinal canal 04/12/2023    RODNEY (generalized anxiety disorder)     GERD (gastroesophageal reflux disease)     Hyperlipidemia     Intractable chronic migraine without aura and without status  migrainosus     Mixed stress and urge urinary incontinence     Pituitary mass 2022       PSH:  Past Surgical History:   Procedure Laterality Date     SECTION  2006    COLECTOMY  2017    COLONOSCOPY  2022    COLONOSCOPY  2017    [facility], Rony Majano MD    HYSTERECTOMY, TOTAL, LAPAROSCOPIC, WITH SALPINGO-OOPHORECTOMY  2024    NECK SURGERY  2019    SPINE SURGERY  2023    C5-C7 ACDF    TUBAL LIGATION  2006       FamHx:  Family History   Problem Relation Name Age of Onset    Ovarian cancer Mother Galina Magallanes 48    Arthritis Father Galina Magallanes     Colon cancer Father Galina Magallanes         age 40       SocHx:  Social History[2]    Physical Exam:  Vitals:    25 1012   BP: (!) 150/91   Pulse: 86   Resp: 18   Temp: 97.9 °F (36.6 °C)     General: A&Ox3, no apparent distress, no deformities  Neck: No masses, normal thyroid  Lungs: CTA santa, no use of accessory muscles  Heart: RRR, no arrhythmias  Abdomen: Soft, NT, ND, no masses, no hernias, no hepatosplenomegaly  Lymphatic: Neck and groin nodes negative  Skin: The skin is warm and dry. No jaundice.  Ext: No c/c/e.      Labs:     Urinalysis:  Component  Ref Range & Units (hover) 10:19   Color, UA Yellow   Spec Grav UA 1.020   pH, UA 5.5   WBC, UA Negative   Nitrite, UA Negative   Protein, POC Negative   Glucose, UA Negative   Ketones, UA Negative   Urobilinogen, UA 0.2   Bilirubin, POC Negative   Blood, UA Negative      Microscopic urinalysis negative WBCs, negative nitrites negative RBCs       Specimen Collected: 25 10:19 CDT Last Resulted: 25 10:19 CDT         Impression:  1. Nocturia  - POCT URINE DIPSTICK WITH MICROSCOPE, AUTOMATED      Plan:  Instructed patient to initiate mirabegron 50 mg p.o. daily.  Discussed with patient initiating bladder instillations as discussed above.  Instructed patient on timed voiding every 2-3 hrs, double voiding, avoidance of bladder irritants such as alcohol,  citrus foods, chocolate, caffeinated drinks, energy drinks, spicy foods, sugar, caffeine products, sodas. Instructed patient to avoid drinking fluids 1-2 hours prior to bedtime & void immediately before bedtime.   RTC 2 months for re-evaluation.  Instructed patient if develops any abnormal urologic symptoms notify clinic to be re-evaluate treated or during after hours go to emergency room versus urgent here.                           [facility]         [1]   Current Outpatient Medications   Medication Sig Dispense Refill    busPIRone (BUSPAR) 7.5 MG tablet TAKE 1 TABLET(7.5 MG) BY MOUTH THREE TIMES DAILY 90 tablet 2    butalbital-acetaminophen-caffeine -40 mg (FIORICET, ESGIC) -40 mg per tablet Take 1 tablet by mouth every 4 (four) hours as needed for Pain. 12 tablet 0    acetaminophen-codeine 300-30mg (TYLENOL #3) 300-30 mg Tab Take 1 tablet by mouth every 8 (eight) hours as needed (pain). (Patient not taking: Reported on 3/13/2025) 12 tablet 0    atorvastatin (LIPITOR) 10 MG tablet Take 1 tablet (10 mg total) by mouth once daily. (Patient not taking: Reported on 3/13/2025) 90 tablet 1    conjugated estrogens (PREMARIN) vaginal cream Place 1 g vaginally once daily. (Patient not taking: Reported on 3/13/2025) 1 applicator 11    diclofenac (VOLTAREN) 50 MG EC tablet Take 1 tablet (50 mg total) by mouth 2 (two) times daily. (Patient not taking: Reported on 3/13/2025) 20 tablet 0    EScitalopram oxalate (LEXAPRO) 10 MG tablet Take 1 tablet by mouth every morning. (Patient not taking: Reported on 3/13/2025)      EScitalopram oxalate (LEXAPRO) 20 MG tablet Take 1 tablet (20 mg total) by mouth once daily. (Patient not taking: Reported on 3/13/2025) 90 tablet 0    estradiol 0.025 mg/24 hr 0.025 mg/24 hr Place 1 patch onto the skin every 7 days. (Patient not taking: Reported on 3/13/2025) 4 patch 11    gabapentin (NEURONTIN) 300 MG capsule Take 1 capsule (300 mg total) by mouth once daily. (Patient not taking: Reported  on 10/4/2024) 30 capsule 0    HYDROcodone-acetaminophen (NORCO) 5-325 mg per tablet Take 1 tablet by mouth 2 (two) times daily as needed.      hydrOXYzine HCL (ATARAX) 25 MG tablet Take 1 tablet (25 mg total) by mouth 3 (three) times daily as needed for Anxiety. (Patient not taking: Reported on 3/13/2025) 90 tablet 1    hyoscyamine 0.125 mg Subl Place 1 tablet (0.125 mg total) under the tongue 3 (three) times daily as needed (abbominal pain). (Patient not taking: Reported on 3/13/2025) 20 tablet 0    levocetirizine (XYZAL) 5 MG tablet Take 1 tablet (5 mg total) by mouth every evening. (Patient not taking: Reported on 3/13/2025) 90 tablet 1    mirabegron (MYRBETRIQ) 25 mg Tb24 ER tablet Take 1 tablet (25 mg total) by mouth once daily. (Patient not taking: Reported on 3/13/2025) 30 tablet 11    nicotine (NICODERM CQ) 21 mg/24 hr Place 1 patch onto the skin once daily. (Patient not taking: Reported on 3/13/2025) 28 patch 0    nicotine polacrilex 2 MG Lozg Take 1 lozenge (2 mg total) by mouth as needed (Do not exceed more than 10 pieces in 24 hours). (Patient not taking: Reported on 3/13/2025) 108 lozenge 0    ondansetron (ZOFRAN-ODT) 4 MG TbDL Take 1 tablet (4 mg total) by mouth every 8 (eight) hours as needed (nausea and vomiting). (Patient not taking: Reported on 3/13/2025) 14 tablet 0    ondansetron (ZOFRAN-ODT) 8 MG TbDL Take 8 mg by mouth every 8 (eight) hours as needed. (Patient not taking: Reported on 3/13/2025)      oxyCODONE (ROXICODONE) 5 MG immediate release tablet Take 5 mg by mouth every 6 (six) hours as needed. (Patient not taking: Reported on 3/13/2025)      pregabalin (LYRICA) 100 MG capsule Take 150 mg by mouth 3 (three) times daily. (Patient not taking: Reported on 3/13/2025)      pregabalin (LYRICA) 150 MG capsule Take 200 mg by mouth 2 (two) times daily. (Patient not taking: Reported on 10/4/2024)      traZODone (DESYREL) 50 MG tablet Take 50 mg by mouth every evening. (Patient not taking: Reported  on 3/13/2025)       No current facility-administered medications for this visit.   [2]   Social History  Socioeconomic History    Marital status: Single   Tobacco Use    Smoking status: Every Day     Current packs/day: 0.50     Average packs/day: 0.6 packs/day for 33.8 years (19.9 ttl pk-yrs)     Types: Cigarettes, Vaping with nicotine     Start date: 1987     Last attempt to quit: 2000     Passive exposure: Current    Smokeless tobacco: Never    Tobacco comments:     Pt smokes 5-10 cpd and vapes   Substance and Sexual Activity    Alcohol use: Yes     Comment: socially    Drug use: Yes     Frequency: 2.0 times per week     Types: Marijuana    Sexual activity: Yes     Partners: Male     Birth control/protection: None, See Surgical Hx   Social History Narrative    ** Merged History Encounter **          Social Drivers of Health     Financial Resource Strain: High Risk (8/1/2024)    Received from [facility]    Overall Financial Resource Strain (CARDIA)     Difficulty of Paying Living Expenses: Very hard   Food Insecurity: Food Insecurity Present (8/22/2024)    Received from [facility]    Hunger Vital Sign     Worried About Running Out of Food in the Last Year: Sometimes true     Ran Out of Food in the Last Year: Sometimes true   Transportation Needs: Unmet Transportation Needs (8/22/2024)    Received from [facility]    PRAPARE - Transportation     Lack of Transportation (Medical): Yes     Lack of Transportation (Non-Medical): Yes   Physical Activity: Inactive (8/1/2024)    Received from [facility]    Exercise Vital Sign     Days of Exercise per Week: 0 days     Minutes of Exercise per Session: 0 min   Stress: Stress Concern Present (8/1/2024)    Received from [facility]    [place] [employer] - Occupational Stress Questionnaire     Feeling of Stress : Very much

## 2025-03-13 NOTE — PROGRESS NOTES
Spoke with patient today in regard to smoking cessation progress for 6 month telephone follow up. Patient states she is not tobacco free. Patient reports seeing her doctor today and her doctor  encouraged her to quit smoking. Scheduled patient to return for Quit #2. Informed patient of benefit period, future follow ups, and contact information if any further help or support is needed. Will  complete smart form for 3/6 month follow up on Quit attempt #1.

## 2025-03-14 ENCOUNTER — CLINICAL SUPPORT (OUTPATIENT)
Dept: UROLOGY | Facility: CLINIC | Age: 55
End: 2025-03-14
Payer: MEDICAID

## 2025-03-14 DIAGNOSIS — N30.10 INTERSTITIAL CYSTITIS: Primary | ICD-10-CM

## 2025-03-14 PROCEDURE — 99212 OFFICE O/P EST SF 10 MIN: CPT | Mod: PBBFAC

## 2025-03-14 RX ORDER — HEPARIN SODIUM 5000 [USP'U]/ML
40000 INJECTION, SOLUTION INTRAVENOUS; SUBCUTANEOUS
Status: COMPLETED | OUTPATIENT
Start: 2025-03-14 | End: 2025-03-14

## 2025-03-14 RX ORDER — SODIUM BICARBONATE 42 MG/ML
5 INJECTION, SOLUTION INTRAVENOUS
Status: COMPLETED | OUTPATIENT
Start: 2025-03-14 | End: 2025-03-14

## 2025-03-14 RX ORDER — LIDOCAINE HYDROCHLORIDE 20 MG/ML
5 INJECTION, SOLUTION INFILTRATION; PERINEURAL
Status: COMPLETED | OUTPATIENT
Start: 2025-03-14 | End: 2025-03-14

## 2025-03-14 RX ORDER — DEXAMETHASONE SODIUM PHOSPHATE 10 MG/ML
100 INJECTION INTRAMUSCULAR; INTRAVENOUS
Status: COMPLETED | OUTPATIENT
Start: 2025-03-14 | End: 2025-03-14

## 2025-03-14 RX ADMIN — SODIUM BICARBONATE 10 ML: 42 INJECTION, SOLUTION INTRAVENOUS at 09:03

## 2025-03-14 RX ADMIN — DEXAMETHASONE SODIUM PHOSPHATE 100 MG: 10 INJECTION INTRAMUSCULAR; INTRAVENOUS at 09:03

## 2025-03-14 RX ADMIN — HEPARIN SODIUM 40000 UNITS: 5000 INJECTION, SOLUTION INTRAVENOUS; SUBCUTANEOUS at 09:03

## 2025-03-14 RX ADMIN — LIDOCAINE HYDROCHLORIDE 5 ML: 20 INJECTION, SOLUTION INFILTRATION; PERINEURAL at 09:03

## 2025-03-14 NOTE — PROGRESS NOTES
Pt presented today for bladder instillation #1. Explained procedure to pt, answered questions, and verified allergies. 14 F catheter inserted without difficulty using aseptic technique. Moderated amount of yellow urine noted. Bladder medication instilled. Pt tolerated well. RTC in one week for next instillation.

## 2025-03-14 NOTE — PATIENT INSTRUCTIONS
Bladder Instillation   Why is this procedure done?   Bladder instillation therapy is when a liquid drug is used to coat the inside of your bladder. Your doctor may order this procedure to:  Treat bladder pain or interstitial cystitis  Treat recurrent bladder infections  Prevent bladder stones  What happens before the procedure?   Your doctor will ask about your health history. Talk to the doctor about:  All the drugs you are taking. Be sure to include all prescription, over the counter, vitamins, and herbal supplements. Bring a list of drugs you take with you. Tell the doctor if you have any drug allergy.  If you take a diuretic, ask the doctor if you should take this drug the morning of your treatment.  If you think you may have a bladder infection, if you have had any fevers, or have had any recent blood in your urine  Any surgeries you have had on your bladder, kidneys, urethra, or ureters  If you need to limit fluids or avoid caffeine before the procedure  The doctor may order tests and check your urine for blood or infection.  What happens during the procedure?   The staff will place a thin, flexible tube through your urethra into your bladder. This is a catheter. The catheter will drain any urine from your bladder.  The doctor will place a mixture of fluid and drugs through the catheter into your bladder. The doctor may heat the fluid before giving it to you.  You will hold the fluid and drugs inside your bladder for a time. Talk with your doctor to find out how long you need to hold the drugs in your bladder to help them work. This could be anywhere from a few minutes to a few hours. Ask your doctor if you need to change positions while the fluid is in your bladder.  What happens after the procedure?   You may have to stay in the doctor's office or hospital for a time while the drugs are in your bladder.  The doctor may take the catheter out right away or it may be left in place to help drain out the fluid  and then be taken out afterwards.  If the doctor takes the catheter out, you will sit on the toilet and pass urine so the urine does not splash when you are allowed to get rid of the fluids and drugs from your bladder.  What care is needed at home?   Ask your doctor what you need to do when you go home. Make sure you ask questions if you do not understand everything the doctor says.  Your doctor may ask you to drink extra fluids after your procedure.  What follow-up care is needed?   Your doctor may ask you to come back to the office to check on your progress. Be sure to keep these visits.  You may need to have more bladder instillations. Talk to your doctor about how many treatments you may need.  What problems could happen?   Bladder or belly pain  Passing urine often  Urinary tract or prostate infection  Severe infection throughout the body  Blood in the urine  Burning feeling in bladder  Feeling tired  Fever  Where can I learn more?   American Cancer Society  https://www.cancer.org/cancer/bladder-cancer/treating/intravesical-therapy.html   [place] [employer]  https://www.baus.org.uk/_userfiles/pages/files/Patients/Leaflets/Painful%20bladder.pdf   Last Reviewed Date   2021-03-18  Consumer Information Use and Disclaimer   This information is not specific medical advice and does not replace information you receive from your health care provider. This is only a brief summary of general information. It does NOT include all information about conditions, illnesses, injuries, tests, procedures, treatments, therapies, discharge instructions or life-style choices that may apply to you. You must talk with your health care provider for complete information about your health and treatment options. This information should not be used to decide whether or not to accept your health care providers advice, instructions or recommendations. Only your health care provider has the knowledge and training to  provide advice that is right for you.  Copyright   Copyright © 2021 Up[employer], Inc. and its affiliates and/or licensors. All rights reserved.

## 2025-03-21 ENCOUNTER — CLINICAL SUPPORT (OUTPATIENT)
Dept: UROLOGY | Facility: CLINIC | Age: 55
End: 2025-03-21
Payer: MEDICAID

## 2025-03-21 DIAGNOSIS — N30.10 INTERSTITIAL CYSTITIS: Primary | ICD-10-CM

## 2025-03-21 LAB
BILIRUB SERPL-MCNC: NORMAL MG/DL
BLOOD URINE, POC: NORMAL
COLOR, POC UA: YELLOW
GLUCOSE UR QL STRIP: NORMAL
KETONES UR QL STRIP: NORMAL
LEUKOCYTE ESTERASE URINE, POC: NORMAL
NITRITE, POC UA: NORMAL
PH, POC UA: 5.5
PROTEIN, POC: NORMAL
SPECIFIC GRAVITY, POC UA: 1.02
UROBILINOGEN, POC UA: 0.2

## 2025-03-21 PROCEDURE — 99212 OFFICE O/P EST SF 10 MIN: CPT | Mod: PBBFAC

## 2025-03-21 RX ORDER — HEPARIN SODIUM 5000 [USP'U]/ML
40000 INJECTION, SOLUTION INTRAVENOUS; SUBCUTANEOUS
Status: COMPLETED | OUTPATIENT
Start: 2025-03-21 | End: 2025-03-21

## 2025-03-21 RX ORDER — LIDOCAINE HYDROCHLORIDE 20 MG/ML
5 INJECTION, SOLUTION INFILTRATION; PERINEURAL
Status: COMPLETED | OUTPATIENT
Start: 2025-03-21 | End: 2025-03-21

## 2025-03-21 RX ORDER — DEXAMETHASONE SODIUM PHOSPHATE 10 MG/ML
100 INJECTION INTRAMUSCULAR; INTRAVENOUS
Status: COMPLETED | OUTPATIENT
Start: 2025-03-21 | End: 2025-03-21

## 2025-03-21 RX ORDER — SODIUM BICARBONATE 42 MG/ML
5 INJECTION, SOLUTION INTRAVENOUS
Status: COMPLETED | OUTPATIENT
Start: 2025-03-21 | End: 2025-03-21

## 2025-03-21 RX ADMIN — SODIUM BICARBONATE 10 ML: 42 INJECTION, SOLUTION INTRAVENOUS at 09:03

## 2025-03-21 RX ADMIN — DEXAMETHASONE SODIUM PHOSPHATE 100 MG: 10 INJECTION INTRAMUSCULAR; INTRAVENOUS at 09:03

## 2025-03-21 RX ADMIN — HEPARIN SODIUM 40000 UNITS: 5000 INJECTION, SOLUTION INTRAVENOUS; SUBCUTANEOUS at 09:03

## 2025-03-21 RX ADMIN — LIDOCAINE HYDROCHLORIDE 5 ML: 20 INJECTION, SOLUTION INFILTRATION; PERINEURAL at 09:03

## 2025-03-21 NOTE — PROGRESS NOTES
"Bladder instillation #1 held for two hrs and "still have bladder pain "stated by patient . Bladder instillation #2 administered with sterile technique via 15 Fr. Guerrier , large amount of clear odorless urine drained from bladder. Procedure tolerated well RTC for weekly instillation     " Patient

## 2025-03-24 ENCOUNTER — OFFICE VISIT (OUTPATIENT)
Dept: GYNECOLOGY | Facility: CLINIC | Age: 55
End: 2025-03-24
Payer: MEDICAID

## 2025-03-24 VITALS
HEIGHT: 69 IN | DIASTOLIC BLOOD PRESSURE: 74 MMHG | OXYGEN SATURATION: 100 % | TEMPERATURE: 98 F | HEART RATE: 83 BPM | SYSTOLIC BLOOD PRESSURE: 107 MMHG | BODY MASS INDEX: 29.03 KG/M2 | WEIGHT: 196 LBS | RESPIRATION RATE: 20 BRPM

## 2025-03-24 DIAGNOSIS — N94.10 DYSPAREUNIA, FEMALE: ICD-10-CM

## 2025-03-24 DIAGNOSIS — R10.2 PELVIC PAIN: ICD-10-CM

## 2025-03-24 DIAGNOSIS — N95.8 GENITOURINARY SYNDROME OF MENOPAUSE: ICD-10-CM

## 2025-03-24 DIAGNOSIS — N95.1 VASOMOTOR SYMPTOMS DUE TO MENOPAUSE: Primary | ICD-10-CM

## 2025-03-24 DIAGNOSIS — N30.10 INTERSTITIAL CYSTITIS: ICD-10-CM

## 2025-03-24 PROCEDURE — 99215 OFFICE O/P EST HI 40 MIN: CPT | Mod: PBBFAC

## 2025-03-24 RX ORDER — DICLOFENAC SODIUM 75 MG/1
75 TABLET, DELAYED RELEASE ORAL 2 TIMES DAILY
COMMUNITY
Start: 2025-03-14

## 2025-03-24 RX ORDER — HYDROCORTISONE 25 MG/G
CREAM TOPICAL
COMMUNITY
Start: 2024-10-10 | End: 2025-04-08

## 2025-03-24 RX ORDER — PREGABALIN 200 MG/1
200 CAPSULE ORAL 3 TIMES DAILY
COMMUNITY
Start: 2025-03-07

## 2025-03-24 RX ORDER — TRAZODONE HYDROCHLORIDE 100 MG/1
100 TABLET ORAL NIGHTLY
COMMUNITY
Start: 2025-02-26

## 2025-03-24 RX ORDER — BUSPIRONE HYDROCHLORIDE 30 MG/1
30 TABLET ORAL 2 TIMES DAILY
COMMUNITY
Start: 2025-02-26

## 2025-03-24 RX ORDER — ESTRADIOL 0.03 MG/D
1 PATCH TRANSDERMAL
Qty: 4 PATCH | Refills: 11 | Status: SHIPPED | OUTPATIENT
Start: 2025-03-24 | End: 2026-03-24

## 2025-03-24 RX ORDER — HYDROXYZINE PAMOATE 50 MG/1
50 CAPSULE ORAL
COMMUNITY
Start: 2024-10-10

## 2025-03-24 RX ORDER — LUMATEPERONE 42 MG/1
1 CAPSULE ORAL
COMMUNITY
Start: 2025-02-27

## 2025-03-24 RX ORDER — LACTULOSE 10 G/15ML
SOLUTION ORAL
COMMUNITY
Start: 2024-10-10

## 2025-03-24 NOTE — PROGRESS NOTES
"[facility] GYNECOLOGY CLINIC NOTE  [facility]  Women's Health Clinic  [address] [place] [address]  Phone: [phone]  Fax: [phone]    Subjective:     HPI:  Galina Walker is a 54 y.o.  who presents complaining of hot flashes for several years worsening with DIONE BSO. She was previously started on HRT in  with relief of vasomotor symptoms, however she discontinued after 1 month per her NP telling her "to stop because she has dense breast tissue". She reports having hot flashes occurring greater than 5 times a day worse at night. There are no transforming factors.    Further she reports urinary incontinence necessitating wearing urinary incontinence pads for 1 year duration. Patient also has dyspareunia with penetration with extreme pain despite vaginal estrogen and use of water based lubricant. She and is currently being seen by urology for interstitial cystitis and bladder instillation therapy. She has also been referred for pelvic PT in the setting of pelvic floor myalgia.Lastly, the patient endorses persistent vaginal discharge white without odor  or itch..    She denies HA, N/V, CP, SOB, abdominal pain, dysuria, LE pain, or new onset edema.     Past Medical History:  Past Medical History:   Diagnosis Date    C5-C7 ACDF 2023    Cervical stenosis of spinal canal 2023    RODNEY (generalized anxiety disorder)     GERD (gastroesophageal reflux disease)     Hyperlipidemia     Intractable chronic migraine without aura and without status migrainosus     Mixed stress and urge urinary incontinence     Pituitary mass 2022       Past Surgical History:  Past Surgical History:   Procedure Laterality Date     SECTION  2006    COLECTOMY  2017    COLONOSCOPY  2022    COLONOSCOPY  2017    [facility], Rony Majano MD    HYSTERECTOMY  24    HYSTERECTOMY, TOTAL, LAPAROSCOPIC, WITH SALPINGO-OOPHORECTOMY  2024    NECK SURGERY  2019 " "   SPINE SURGERY  2023    C5-C7 ACDF    TUBAL LIGATION  2006       Gynecologic History:  Menopause around 48.  When had menses, lasted 7-8 days, on heaviest days uses an entire pack of tampons.   Patient denies abnormal vaginal bleeding, abnormal discharge, vulvar rash or skin changes.   No hx of STDs  Sexually active with one  male partner.   Pap 2024 NILM/HPV.       Obstetric History:   OB History    Para Term  AB Living   4 3 2 1 1 4   SAB IAB Ectopic Multiple Live Births   1 0 0 1 4      # Outcome Date GA Lbr Aureliano/2nd Weight Sex Type Anes PTL Lv   4 Term      Vag-Spont   KALE   3A       CS-Unspec   KALE   3B       CS-Unspec   KALE   2 Term      Vag-Spont   KALE   1 SAB      SAB   FD       Family History:   Family History   Problem Relation Name Age of Onset    Ovarian cancer Mother Galina Magallanes 48    Cancer Mother Galina Magallanes     Arthritis Father Galina Magallanes     Colon cancer Father Galina Magallanes         age 40    Cancer Father Galina Magallanes        Social History:  Social History[1]    Allergies:  Review of patient's allergies indicates:   Allergen Reactions    Ibuprofen Nausea Only       Medications:  Current Medications[2]    Review of Systems:  Negative unless noted in HPI.    Objective:     Vitals:    25 0925   BP: 107/74   BP Location: Left arm   Patient Position: Sitting   Pulse: 83   Resp: 20   Temp: 98.2 °F (36.8 °C)   SpO2: 100%   Weight: 88.9 kg (196 lb)   Height: 5' 9" (1.753 m)     Body mass index is 28.94 kg/m².    Physical Exam:  General: Alert and oriented, in no acute distress.  Lungs: No conversational dyspnea, no respiratory distress, no tachypnea.  Heart: Regular rate.  Abdomen: Soft, non-distended, non tender to  palpation, no involuntary guarding, no rebound tenderness.  Extremities: No cords or calf tenderness.  External genitalia: Normal female genitalia without lesion, discharge or tenderness. Atropic appearing urethral meatus. " Normal appearing external anus.  Bimanual Exam: No inguinal lymphadenopathy noted bilaterally. Tenderness to palpation of the pelvic floor and tenderness to palpation of anterior vaginal wall. Uterus non palpable. No adnexal fullness/tenderness.  Speculum Exam: Vaginal mucosa normal in appearance, no lesions or discharge. Pink with atropic changes. Vaginal cuff is intact.    Note: Female RN chaperone present for entirety of exam.     Imaging  No images are attached to the encounter.  Assessment/Plan:    Galina Walker is a 54 y.o.  presenting with vasomotor symptoms of menopause     Vasomotor symptoms of menopause   -Patient symptoms previously relieved by estrogen patch   -Risk, benefits, and alternatives discussed of HRT  -Lifestyle changes, exercise, and dietary changes discussed pt desires HRT  -Patient to be prescribed estrogen patch   -Patient to continue vaginal estrogen     Pelvic pain  -Likely multifactorial   -Patient to follow up for pelvic floor PT  -Patient to continue to follow with urology   -Continue vaginal estrogen    Patient to follow up 3-4 months for medication toleration       Future Appointments   Date Time Provider Department Center   3/24/2025 10:00 AM RESIDENTS, [facility] GYN [facility]   3/27/2025 11:00 AM Maribell Dickinson CTTS [facility]   3/28/2025  9:20 AM NURSE, [facility] UROLOGY [facility]OL[facility]    2025  9:00 AM Magdi Rivera, BLANCA [facility]   2025 12:40 PM Aisha Preston MD [facility]   2026  9:30 AM Nichol Cantu FNP [facility] GYN [facility]        Patient and plan were discussed with Dr. Ferris.    Mino Donahue MD, MPH  Obstetrics and Gynecology  [facility]  PGY-2           [1]   Social History  Socioeconomic History    Marital status: Single   Tobacco Use    Smoking status: Every Day     Current packs/day: 0.50     Average packs/day: 0.6 packs/day for 33.8 years (19.9 ttl pk-yrs)      Types: Cigarettes, Vaping with nicotine     Start date:      Last attempt to quit:      Passive exposure: Current    Smokeless tobacco: Never    Tobacco comments:     Pt smokes 5-10 cpd and vapes   Substance and Sexual Activity    Alcohol use: Yes     Comment: socially    Drug use: Yes     Frequency: 2.0 times per week     Types: Marijuana    Sexual activity: Yes     Partners: Male     Birth control/protection: None, See Surgical Hx   Social History Narrative    ** Merged History Encounter **          Social Drivers of Health     Financial Resource Strain: High Risk (2024)    Received from [facility]    Overall Financial Resource Strain (CARDIA)     Difficulty of Paying Living Expenses: Very hard   Food Insecurity: Food Insecurity Present (2024)    Received from [facility]    Hunger Vital Sign     Worried About Running Out of Food in the Last Year: Sometimes true     Ran Out of Food in the Last Year: Sometimes true   Transportation Needs: Unmet Transportation Needs (2024)    Received from [facility]    PRAPARE - Transportation     Lack of Transportation (Medical): Yes     Lack of Transportation (Non-Medical): Yes   Physical Activity: Inactive (2024)    Received from [facility]    Exercise Vital Sign     Days of Exercise per Week: 0 days     Minutes of Exercise per Session: 0 min   Stress: Stress Concern Present (2024)    Received from [facility]    [employer] [place] [employer] - Occupational Stress Questionnaire     Feeling of Stress : Very much   [2]   Current Outpatient Medications:     busPIRone (BUSPAR) 30 MG Tab, Take 30 mg by mouth 2 (two) times daily., Disp: , Rfl:     CAPLYTA 42 mg Cap, Take 1 capsule by mouth., Disp: , Rfl:     CONSTULOSE 10 gram/15 mL solution, SMARTSI Milliliter(s) By Mouth Twice Daily PRN, Disp: , Rfl:     diclofenac (VOLTAREN) 75 MG EC tablet, Take 75 mg by mouth 2 (two) times daily., Disp: , Rfl:     hydrocortisone 2.5 % cream,  Apply topically., Disp: , Rfl:     hydrOXYzine pamoate (VISTARIL) 50 MG Cap, Take 50 mg by mouth., Disp: , Rfl:     LACTULOSE ORAL, Take 20 g by mouth 2 (two) times daily as needed., Disp: , Rfl:     pregabalin (LYRICA) 200 MG Cap, Take 200 mg by mouth 3 (three) times daily., Disp: , Rfl:     traZODone (DESYREL) 100 MG tablet, Take 100 mg by mouth every evening., Disp: , Rfl:     atorvastatin (LIPITOR) 10 MG tablet, Take 1 tablet (10 mg total) by mouth once daily. (Patient not taking: Reported on 3/13/2025), Disp: 90 tablet, Rfl: 1    butalbital-acetaminophen-caffeine -40 mg (FIORICET, ESGIC) -40 mg per tablet, Take 1 tablet by mouth every 4 (four) hours as needed for Pain., Disp: 12 tablet, Rfl: 0    conjugated estrogens (PREMARIN) vaginal cream, Place 1 g vaginally once daily. (Patient not taking: Reported on 3/13/2025), Disp: 1 applicator, Rfl: 11    EScitalopram oxalate (LEXAPRO) 20 MG tablet, Take 1 tablet (20 mg total) by mouth once daily. (Patient not taking: Reported on 3/13/2025), Disp: 90 tablet, Rfl: 0    gabapentin (NEURONTIN) 300 MG capsule, Take 1 capsule (300 mg total) by mouth once daily. (Patient not taking: Reported on 10/4/2024), Disp: 30 capsule, Rfl: 0    HYDROcodone-acetaminophen (NORCO) 5-325 mg per tablet, Take 1 tablet by mouth 2 (two) times daily as needed., Disp: , Rfl:     hydrOXYzine HCL (ATARAX) 25 MG tablet, Take 1 tablet (25 mg total) by mouth 3 (three) times daily as needed for Anxiety. (Patient not taking: Reported on 3/13/2025), Disp: 90 tablet, Rfl: 1    hyoscyamine 0.125 mg Subl, Place 1 tablet (0.125 mg total) under the tongue 3 (three) times daily as needed (abbominal pain). (Patient not taking: Reported on 3/13/2025), Disp: 20 tablet, Rfl: 0    levocetirizine (XYZAL) 5 MG tablet, Take 1 tablet (5 mg total) by mouth every evening. (Patient not taking: Reported on 3/13/2025), Disp: 90 tablet, Rfl: 1    mirabegron (MYRBETRIQ) 50 mg Tb24, Take 1 tablet (50 mg total) by  mouth once daily., Disp: 90 tablet, Rfl: 3

## 2025-03-27 ENCOUNTER — TELEPHONE (OUTPATIENT)
Dept: SMOKING CESSATION | Facility: CLINIC | Age: 55
End: 2025-03-27
Payer: MEDICAID

## 2025-03-27 NOTE — TELEPHONE ENCOUNTER
Pt had not shown up for her [facility] appointment. Called pt. No answer. Left voice message with contact information.

## 2025-03-28 ENCOUNTER — CLINICAL SUPPORT (OUTPATIENT)
Dept: UROLOGY | Facility: CLINIC | Age: 55
End: 2025-03-28
Payer: MEDICAID

## 2025-03-28 DIAGNOSIS — N30.10 INTERSTITIAL CYSTITIS: Primary | ICD-10-CM

## 2025-03-28 PROCEDURE — 99212 OFFICE O/P EST SF 10 MIN: CPT | Mod: PBBFAC

## 2025-03-28 RX ORDER — HEPARIN SODIUM 5000 [USP'U]/ML
40000 INJECTION, SOLUTION INTRAVENOUS; SUBCUTANEOUS
Status: COMPLETED | OUTPATIENT
Start: 2025-03-28 | End: 2025-03-28

## 2025-03-28 RX ORDER — SODIUM BICARBONATE 42 MG/ML
5 INJECTION, SOLUTION INTRAVENOUS
Status: COMPLETED | OUTPATIENT
Start: 2025-03-28 | End: 2025-03-28

## 2025-03-28 RX ORDER — DEXAMETHASONE SODIUM PHOSPHATE 10 MG/ML
100 INJECTION INTRAMUSCULAR; INTRAVENOUS
Status: COMPLETED | OUTPATIENT
Start: 2025-03-28 | End: 2025-03-28

## 2025-03-28 RX ORDER — LIDOCAINE HYDROCHLORIDE 20 MG/ML
5 INJECTION, SOLUTION INFILTRATION; PERINEURAL
Status: COMPLETED | OUTPATIENT
Start: 2025-03-28 | End: 2025-03-28

## 2025-03-28 RX ADMIN — SODIUM BICARBONATE 5 MEQ: 42 INJECTION, SOLUTION INTRAVENOUS at 08:03

## 2025-03-28 RX ADMIN — LIDOCAINE HYDROCHLORIDE 5 ML: 20 INJECTION, SOLUTION INFILTRATION; PERINEURAL at 08:03

## 2025-03-28 RX ADMIN — HEPARIN SODIUM 40000 UNITS: 5000 INJECTION, SOLUTION INTRAVENOUS; SUBCUTANEOUS at 08:03

## 2025-03-28 RX ADMIN — DEXAMETHASONE SODIUM PHOSPHATE 100 MG: 10 INJECTION INTRAMUSCULAR; INTRAVENOUS at 08:03

## 2025-03-28 NOTE — PROGRESS NOTES
Pt presents today for bladder instillation #3. Was able to hold last instillation 1.5 hours. Pt C/O of bladder pain and pressure, denies burning. Verified pt allergies, and answered questions. 14 F catheter inserted using aseptic technique, moderated amount of yellow urine noted. Bladder medication instilled, pt tolerated well. RTC in one week for next instillation.

## 2025-04-25 ENCOUNTER — CLINICAL SUPPORT (OUTPATIENT)
Dept: UROLOGY | Facility: CLINIC | Age: 55
End: 2025-04-25
Payer: MEDICAID

## 2025-04-25 DIAGNOSIS — N30.10 INTERSTITIAL CYSTITIS: Primary | ICD-10-CM

## 2025-04-25 PROCEDURE — 99212 OFFICE O/P EST SF 10 MIN: CPT | Mod: PBBFAC

## 2025-04-25 RX ORDER — HEPARIN SODIUM 5000 [USP'U]/ML
40000 INJECTION, SOLUTION INTRAVENOUS; SUBCUTANEOUS
Status: COMPLETED | OUTPATIENT
Start: 2025-04-25 | End: 2025-04-25

## 2025-04-25 RX ORDER — SODIUM BICARBONATE 42 MG/ML
5 INJECTION, SOLUTION INTRAVENOUS
Status: COMPLETED | OUTPATIENT
Start: 2025-04-25 | End: 2025-04-25

## 2025-04-25 RX ORDER — DEXAMETHASONE SODIUM PHOSPHATE 10 MG/ML
100 INJECTION INTRAMUSCULAR; INTRAVENOUS
Status: COMPLETED | OUTPATIENT
Start: 2025-04-25 | End: 2025-04-25

## 2025-04-25 RX ORDER — LIDOCAINE HYDROCHLORIDE 20 MG/ML
5 INJECTION, SOLUTION INFILTRATION; PERINEURAL
Status: COMPLETED | OUTPATIENT
Start: 2025-04-25 | End: 2025-04-25

## 2025-04-25 RX ADMIN — LIDOCAINE HYDROCHLORIDE 5 ML: 20 INJECTION, SOLUTION INFILTRATION; PERINEURAL at 09:04

## 2025-04-25 RX ADMIN — DEXAMETHASONE SODIUM PHOSPHATE 100 MG: 10 INJECTION INTRAMUSCULAR; INTRAVENOUS at 09:04

## 2025-04-25 RX ADMIN — SODIUM BICARBONATE 5 MEQ: 42 INJECTION, SOLUTION INTRAVENOUS at 09:04

## 2025-04-25 RX ADMIN — HEPARIN SODIUM 40000 UNITS: 5000 INJECTION, SOLUTION INTRAVENOUS; SUBCUTANEOUS at 09:04

## 2025-04-25 NOTE — PROGRESS NOTES
Pt presented today to restart bladder instillations due to missed appointments. Verified pt allergies, and answered pt questions. 14 F catheter inserted using aseptic technique, yellow urine noted in tubing. Bladder medication instilled, pt tolerated well. RTC in one week for instillation #2.

## 2025-04-30 ENCOUNTER — CLINICAL SUPPORT (OUTPATIENT)
Dept: UROLOGY | Facility: CLINIC | Age: 55
End: 2025-04-30
Payer: MEDICAID

## 2025-04-30 DIAGNOSIS — N30.10 INTERSTITIAL CYSTITIS: Primary | ICD-10-CM

## 2025-04-30 PROCEDURE — 99212 OFFICE O/P EST SF 10 MIN: CPT | Mod: PBBFAC

## 2025-04-30 PROCEDURE — 51700 IRRIGATION OF BLADDER: CPT | Mod: PBBFAC | Performed by: NURSE PRACTITIONER

## 2025-04-30 PROCEDURE — 51700 IRRIGATION OF BLADDER: CPT | Mod: S$PBB,,, | Performed by: NURSE PRACTITIONER

## 2025-04-30 PROCEDURE — 96372 THER/PROPH/DIAG INJ SC/IM: CPT | Mod: PBBFAC

## 2025-04-30 RX ORDER — SODIUM BICARBONATE 42 MG/ML
5 INJECTION, SOLUTION INTRAVENOUS
Status: COMPLETED | OUTPATIENT
Start: 2025-04-30 | End: 2025-04-30

## 2025-04-30 RX ORDER — LIDOCAINE HYDROCHLORIDE 20 MG/ML
5 INJECTION, SOLUTION INFILTRATION; PERINEURAL
Status: COMPLETED | OUTPATIENT
Start: 2025-04-30 | End: 2025-04-30

## 2025-04-30 RX ORDER — DEXAMETHASONE SODIUM PHOSPHATE 10 MG/ML
100 INJECTION INTRAMUSCULAR; INTRAVENOUS
Status: COMPLETED | OUTPATIENT
Start: 2025-04-30 | End: 2025-04-30

## 2025-04-30 RX ORDER — HEPARIN SODIUM 5000 [USP'U]/ML
40000 INJECTION, SOLUTION INTRAVENOUS; SUBCUTANEOUS
Status: COMPLETED | OUTPATIENT
Start: 2025-04-30 | End: 2025-04-30

## 2025-04-30 RX ADMIN — DEXAMETHASONE SODIUM PHOSPHATE 100 MG: 10 INJECTION, SOLUTION INTRAMUSCULAR; INTRAVENOUS at 10:04

## 2025-04-30 RX ADMIN — LIDOCAINE HYDROCHLORIDE 5 ML: 20 INJECTION, SOLUTION INFILTRATION; PERINEURAL at 11:04

## 2025-04-30 RX ADMIN — SODIUM BICARBONATE 5 MEQ: 42 INJECTION, SOLUTION INTRAVENOUS at 11:04

## 2025-04-30 RX ADMIN — HEPARIN SODIUM 40000 UNITS: 5000 INJECTION, SOLUTION INTRAVENOUS; SUBCUTANEOUS at 11:04

## 2025-04-30 NOTE — PROGRESS NOTES
Pt presented today for bladder instillation #2. Verified pt allergies. Pt states she was able to hold last instillation for two hours. 14 F catheter inserted using aseptic technique, small amount of yellow urine noted. Bladder medication instilled, pt tolerated well. RTC in one week for next instillation.

## 2025-06-12 ENCOUNTER — OFFICE VISIT (OUTPATIENT)
Dept: UROLOGY | Facility: CLINIC | Age: 55
End: 2025-06-12
Payer: MEDICAID

## 2025-06-12 VITALS
DIASTOLIC BLOOD PRESSURE: 90 MMHG | SYSTOLIC BLOOD PRESSURE: 132 MMHG | RESPIRATION RATE: 18 BRPM | BODY MASS INDEX: 29.33 KG/M2 | WEIGHT: 198 LBS | OXYGEN SATURATION: 100 % | HEART RATE: 65 BPM | HEIGHT: 69 IN

## 2025-06-12 DIAGNOSIS — N30.10 INTERSTITIAL CYSTITIS: Primary | ICD-10-CM

## 2025-06-12 DIAGNOSIS — R35.1 NOCTURIA: ICD-10-CM

## 2025-06-12 LAB
BILIRUB SERPL-MCNC: NEGATIVE MG/DL
BLOOD URINE, POC: NEGATIVE
COLOR, POC UA: NORMAL
GLUCOSE UR QL STRIP: NEGATIVE
KETONES UR QL STRIP: NEGATIVE
LEUKOCYTE ESTERASE URINE, POC: NEGATIVE
NITRITE, POC UA: NEGATIVE
PH, POC UA: 5.5
PROTEIN, POC: NEGATIVE
SPECIFIC GRAVITY, POC UA: 1
UROBILINOGEN, POC UA: 0.2

## 2025-06-12 PROCEDURE — 1160F RVW MEDS BY RX/DR IN RCRD: CPT | Mod: CPTII,,, | Performed by: NURSE PRACTITIONER

## 2025-06-12 PROCEDURE — 99215 OFFICE O/P EST HI 40 MIN: CPT | Mod: PBBFAC | Performed by: NURSE PRACTITIONER

## 2025-06-12 PROCEDURE — 3008F BODY MASS INDEX DOCD: CPT | Mod: CPTII,,, | Performed by: NURSE PRACTITIONER

## 2025-06-12 PROCEDURE — 3075F SYST BP GE 130 - 139MM HG: CPT | Mod: CPTII,,, | Performed by: NURSE PRACTITIONER

## 2025-06-12 PROCEDURE — 99213 OFFICE O/P EST LOW 20 MIN: CPT | Mod: S$PBB,,, | Performed by: NURSE PRACTITIONER

## 2025-06-12 PROCEDURE — 81001 URINALYSIS AUTO W/SCOPE: CPT | Mod: PBBFAC | Performed by: NURSE PRACTITIONER

## 2025-06-12 PROCEDURE — 1159F MED LIST DOCD IN RCRD: CPT | Mod: CPTII,,, | Performed by: NURSE PRACTITIONER

## 2025-06-12 PROCEDURE — 3080F DIAST BP >= 90 MM HG: CPT | Mod: CPTII,,, | Performed by: NURSE PRACTITIONER

## 2025-06-12 NOTE — PROGRESS NOTES
Patient seen by Miguel RIOS. Pt will RTC for cysto after CT scan. Pt eduction given both written and verbal.

## 2025-06-12 NOTE — PROGRESS NOTES
Chief Complaint:   Chief Complaint   Patient presents with    Follow-up       HPI:   Patient is a 53-year-old female follow-up for nocturia.   Patient seen by PCP for recurrent nocturia and urinary frequency 6-7 times per night. Patient admits to drinking over 2 posterior coffee per day and does not subside and drinking 1 hour prior to bedtime however she does void before bedtime and avoid drinking any fluids at night.   Today patient complaining of consistent bladder pain and urinary frequency 10 times during the day in 6-7 times at night.  Patient did not start mirabegron 25 mg p.o. daily as initiated on last appointment.  Patient stated she was examined by her OBGYN and her assessment was of an inflamed bladder.  Patient previously initiated bladder instillations however she is only able to participate in 5 treatments and she felt work at all therefore she stopped the instillations Myrbetriq 50 mg p.o. daily was started on patient which gave no relief whatsoever.  Discussed with patient we will referred to Dr. Hu for a cystoscope, scheduled for a CT of the abdomen pelvis with oral contrast so evaluate urologic system for persistent irritative bladder symptoms.  Allergies:  Review of patient's allergies indicates:   Allergen Reactions    Ibuprofen Nausea Only       Medications:  Current Medications[1]    Review of Systems:  General: No fever, chills, fatigability, or weight loss.  Skin: No rashes, itching, or changes in color or texture of skin.  Chest: Denies ANDERSEN, cyanosis, wheezing, cough, and sputum production.  Abdomen: Appetite fine. No weight loss. Denies diarrhea, abdominal pain, hematemesis, or blood in stool.  Musculoskeletal: No joint stiffness or swelling. Denies back pain.  : As above.  All other review of systems negative.    PMH:  Past Medical History:   Diagnosis Date    C5-C7 ACDF 05/24/2023    Cervical stenosis of spinal canal 04/12/2023    RODNEY (generalized anxiety disorder)     GERD  (gastroesophageal reflux disease)     Hyperlipidemia     Intractable chronic migraine without aura and without status migrainosus     Mixed stress and urge urinary incontinence     Pituitary mass 2022       PSH:  Past Surgical History:   Procedure Laterality Date     SECTION  2006    COLECTOMY  2017    COLONOSCOPY  2022    COLONOSCOPY  2017    [facility], Rony Majano MD    HYSTERECTOMY  25    HYSTERECTOMY, TOTAL, LAPAROSCOPIC, WITH SALPINGO-OOPHORECTOMY  2024    NECK SURGERY  2019    SPINE SURGERY  2023    C5-C7 ACDF    TUBAL LIGATION  2006       FamHx:  Family History   Problem Relation Name Age of Onset    Ovarian cancer Mother Galina Magallanes 48    Cancer Mother Galina Magallanes     Arthritis Father Galina Magallanes     Colon cancer Father Galina Magallanes         age 40    Cancer Father Galina Magallanes        SocHx:  Social History[2]    Physical Exam:  Vitals:    25 1324   BP: (!) 132/90   Pulse:    Resp:      General: A&Ox3, no apparent distress, no deformities  Neck: No masses, normal thyroid  Lungs: CTA santa, no use of accessory muscles  Heart: RRR, no arrhythmias  Abdomen: Soft, NT, ND, no masses, no hernias, no hepatosplenomegaly  Lymphatic: Neck and groin nodes negative  Skin: The skin is warm and dry. No jaundice.  Ext: No c/c/e.    Urinalysis:  Component  Ref Range & Units (hover) 13:34   Color, UA Light Yellow   Spec Grav UA 1.005   pH, UA 5.5   WBC, UA Negative   Nitrite, UA Negative   Protein, POC Negative   Glucose, UA Negative   Ketones, UA Negative   Urobilinogen, UA 0.2   Bilirubin, POC Negative   Blood, UA Negative      Microscopic urinalysis RBCs negative, nitrites negative, WBCs negative.       Specimen Collected: 25 13:34 CDT Last Resulted: 25 13:34 CDT     Imaging:       Impression:  1. Interstitial cystitis  - POCT URINE DIPSTICK WITH MICROSCOPE, AUTOMATED    2. Nocturia  - POCT URINE DIPSTICK WITH MICROSCOPE,  AUTOMATED      Plan:  Instructed patient to scheduled for a CT of the abdomen pelvis with and without contrast to evaluate urologic system, schedule patient for a cystoscope with Dr. Hu for persistent irritative bladder symptoms.  Instructed patient on timed voiding every 2-3 hrs, double voiding, avoidance of bladder irritants such as alcohol, citrus foods, chocolate, caffeinated drinks, energy drinks, spicy foods, sugar, caffeine products, sodas. Instructed patient to avoid drinking fluids 1-2 hours prior to bedtime & void immediately before bedtime.   RTC one-month after cystoscope.  Instructed patient if develops any abnormal urologic symptoms notify clinic to be re-evaluate treated or during after hours go to emergency room versus urgent here.                           [facility]       [1]   Current Outpatient Medications   Medication Sig Dispense Refill    butalbital-acetaminophen-caffeine -40 mg (FIORICET, ESGIC) -40 mg per tablet Take 1 tablet by mouth every 4 (four) hours as needed for Pain. 12 tablet 0    CAPLYTA 42 mg Cap Take 1 capsule by mouth.      diclofenac (VOLTAREN) 75 MG EC tablet Take 75 mg by mouth 2 (two) times daily.      estradiol 0.025 mg/24 hr 0.025 mg/24 hr Place 1 patch onto the skin every 7 days. 4 patch 11    hydrOXYzine pamoate (VISTARIL) 50 MG Cap Take 50 mg by mouth.      LACTULOSE ORAL Take 20 g by mouth 2 (two) times daily as needed.      mirabegron (MYRBETRIQ) 50 mg Tb24 Take 1 tablet (50 mg total) by mouth once daily. 90 tablet 3    pregabalin (LYRICA) 200 MG Cap Take 200 mg by mouth 3 (three) times daily.      traZODone (DESYREL) 100 MG tablet Take 100 mg by mouth every evening.      atorvastatin (LIPITOR) 10 MG tablet Take 1 tablet (10 mg total) by mouth once daily. (Patient not taking: Reported on 9/30/2024) 90 tablet 1    busPIRone (BUSPAR) 30 MG Tab Take 30 mg by mouth 2 (two) times daily.      conjugated estrogens (PREMARIN) vaginal cream Place 1 g vaginally once  daily. (Patient not taking: Reported on 2025) 1 applicator 11    CONSTULOSE 10 gram/15 mL solution SMARTSI Milliliter(s) By Mouth Twice Daily PRN      EScitalopram oxalate (LEXAPRO) 20 MG tablet Take 1 tablet (20 mg total) by mouth once daily. (Patient not taking: Reported on 2024) 90 tablet 0    gabapentin (NEURONTIN) 300 MG capsule Take 1 capsule (300 mg total) by mouth once daily. (Patient not taking: Reported on 10/4/2024) 30 capsule 0    HYDROcodone-acetaminophen (NORCO) 5-325 mg per tablet Take 1 tablet by mouth 2 (two) times daily as needed.      hydrocortisone 2.5 % cream Apply topically.      hydrOXYzine HCL (ATARAX) 25 MG tablet Take 1 tablet (25 mg total) by mouth 3 (three) times daily as needed for Anxiety. (Patient not taking: Reported on 2024) 90 tablet 1    hyoscyamine 0.125 mg Subl Place 1 tablet (0.125 mg total) under the tongue 3 (three) times daily as needed (abbominal pain). (Patient not taking: Reported on 3/13/2025) 20 tablet 0    levocetirizine (XYZAL) 5 MG tablet Take 1 tablet (5 mg total) by mouth every evening. (Patient not taking: Reported on 2024) 90 tablet 1     No current facility-administered medications for this visit.   [2]   Social History  Socioeconomic History    Marital status: Single   Tobacco Use    Smoking status: Every Day     Current packs/day: 0.50     Average packs/day: 0.6 packs/day for 34.0 years (20.1 ttl pk-yrs)     Types: Cigarettes, Vaping with nicotine     Start date:      Last attempt to quit:      Passive exposure: Current    Smokeless tobacco: Never    Tobacco comments:     Pt smokes 5-10 cpd and vapes   Substance and Sexual Activity    Alcohol use: Yes     Comment: socially    Drug use: Yes     Frequency: 2.0 times per week     Types: Marijuana    Sexual activity: Yes     Partners: Male     Birth control/protection: None, See Surgical Hx   Social History Narrative    ** Merged History Encounter **          Social Drivers of Health      Financial Resource Strain: High Risk (8/1/2024)    Received from [facility]    Overall Financial Resource Strain (CARDIA)     Difficulty of Paying Living Expenses: Very hard   Food Insecurity: Food Insecurity Present (8/22/2024)    Received from [facility]    Hunger Vital Sign     Worried About Running Out of Food in the Last Year: Sometimes true     Ran Out of Food in the Last Year: Sometimes true   Transportation Needs: Unmet Transportation Needs (8/22/2024)    Received from [facility]    PRAPARE - Transportation     Lack of Transportation (Medical): Yes     Lack of Transportation (Non-Medical): Yes   Physical Activity: Inactive (8/1/2024)    Received from [facility]    Exercise Vital Sign     Days of Exercise per Week: 0 days     Minutes of Exercise per Session: 0 min   Stress: Stress Concern Present (8/1/2024)    Received from [facility]    [employer] [place] [employer] - Occupational Stress Questionnaire     Feeling of Stress : Very much

## 2025-06-23 ENCOUNTER — HOSPITAL ENCOUNTER (OUTPATIENT)
Dept: RADIOLOGY | Facility: HOSPITAL | Age: 55
Discharge: HOME | End: 2025-06-23
Attending: NURSE PRACTITIONER
Payer: MEDICAID

## 2025-06-23 DIAGNOSIS — N30.10 INTERSTITIAL CYSTITIS: ICD-10-CM

## 2025-06-23 PROCEDURE — 25500020 PHARM REV CODE 255: Performed by: NURSE PRACTITIONER

## 2025-06-23 PROCEDURE — 74178 CT ABD&PLV WO CNTR FLWD CNTR: CPT | Mod: TC

## 2025-06-23 RX ADMIN — IOHEXOL 95 ML: 350 INJECTION, SOLUTION INTRAVENOUS at 11:06

## 2025-07-02 ENCOUNTER — CLINICAL SUPPORT (OUTPATIENT)
Dept: GYNECOLOGY | Facility: CLINIC | Age: 55
End: 2025-07-02
Payer: MEDICAID

## 2025-07-02 ENCOUNTER — ON-DEMAND VIRTUAL (OUTPATIENT)
Dept: URGENT CARE | Facility: CLINIC | Age: 55
End: 2025-07-02
Payer: MEDICAID

## 2025-07-02 DIAGNOSIS — K59.00 CONSTIPATION, UNSPECIFIED CONSTIPATION TYPE: ICD-10-CM

## 2025-07-02 DIAGNOSIS — N30.10 INTERSTITIAL CYSTITIS: ICD-10-CM

## 2025-07-02 DIAGNOSIS — M54.9 BACK PAIN, UNSPECIFIED BACK LOCATION, UNSPECIFIED BACK PAIN LATERALITY, UNSPECIFIED CHRONICITY: ICD-10-CM

## 2025-07-02 DIAGNOSIS — R10.9 ABDOMINAL PAIN, UNSPECIFIED ABDOMINAL LOCATION: Primary | ICD-10-CM

## 2025-07-02 DIAGNOSIS — N20.0 KIDNEY STONE: ICD-10-CM

## 2025-07-02 DIAGNOSIS — Z87.442 HISTORY OF KIDNEY STONES: ICD-10-CM

## 2025-07-02 DIAGNOSIS — N95.1 VASOMOTOR SYMPTOMS DUE TO MENOPAUSE: Primary | ICD-10-CM

## 2025-07-02 RX ORDER — ESTRADIOL 0.05 MG/D
1 PATCH TRANSDERMAL
Qty: 4 PATCH | Refills: 11 | Status: SHIPPED | OUTPATIENT
Start: 2025-07-02 | End: 2026-07-02

## 2025-07-02 RX ORDER — HYOSCYAMINE SULFATE 0.12 MG/1
0.12 TABLET SUBLINGUAL EVERY 4 HOURS PRN
Qty: 20 TABLET | Refills: 0 | Status: SHIPPED | OUTPATIENT
Start: 2025-07-02

## 2025-07-02 RX ORDER — ASPIRIN 325 MG
50000 TABLET, DELAYED RELEASE (ENTERIC COATED) ORAL
COMMUNITY
Start: 2025-04-07

## 2025-07-02 NOTE — PROGRESS NOTES
[facility] OB/GYN CLINIC NOTE  [facility]  [address]  [place] [address]  Phone: [phone]  Fax: [phone]    [facility] Women's Health Clinic Progress Note    Primary Site: [facility]  Patient location: Home  Physician location: In office      Chief Complaint: follow up on HRT    HPI  Galina Walker joined me via our telemedicine platform.    Patient reports:   - Patient started her HRT patch since last visit. Reports night flashes have improved, but still having them nightly.   - Has been using vaginal estrogen until she ran out a few weeks prior.   - She declines PFPT as her bladder is too sensitive for internal work.     - Cramping feeling in her pelvis, she reports that this has been happening intermittently for several. Reports right sided to her back. Was told that she had a kidney stone. Drinking 7-8 bottles of water a day.   - Reports BM this AM, and usually daily. Reports somewhat harder consistency.     Discussed with patient increase of hormone replacement therapy given continuation of vasomotor symptoms of menopause.  We will increase dose to 0.05 we will increase to 0.05 mg.  Patient informed that Premarin refills are present in the pharmacy for her to .  Patient unable to  Levsin prescription for interstitial cystitis is written for an emergency room given cost.  Will try resending under different diagnosis code to see if insurance will cover.    We will follow-up in 3-4 months for vasomotor symptom check in.  Patient knows to call if it has been a month and no improvement on current dose of hormone replacement therapy.    Assessment/Plan  Problem List Items Addressed This Visit    None  Visit Diagnoses         Vasomotor symptoms due to menopause    -  Primary    Relevant Medications    estradiol 0.05 mg/24 hr td ptwk 0.05 mg/24 hr PTWK      Kidney stone        Relevant Medications    hyoscyamine 0.125 mg Subl      Interstitial cystitis        Relevant Medications    hyoscyamine 0.125 mg Subl           See correspondence above for plan.   Patient's needs assessed and health education provided. Patient understands risks, benefits, and alternatives of treatment prescribed above. Patient verbalizes understanding and agrees to follow plan.    Patient's identity was confirmed and confidentiality/privacy confirmed prior to visit. I certify that this visit was done via secure two-way transmission with informed consent of the patient and/or guardian.  Each patient to whom I provide medical services by telemedicine is:  (1) informed of the relationship between the physician and patient and the respective role of any other health care provider with respect to management of the patient; and (2) notified that they may decline to receive medical services by telemedicine and may withdraw from such care at any time. Patient verbally consented to receive this service via voice-only telephone call.    Audio only was selected because there was no capability for video conferencing with patient.    Keyla Huerta,   [employer] [facility] PGY-4

## 2025-07-02 NOTE — PATIENT INSTRUCTIONS
Patient Education       Kidney Stones Discharge Instructions   About this topic   Kidney stones are a build up of salts and minerals from your urine. Most of the time a kidney stone leaves your body when you urinate. Sometimes the kidney stone can get stuck on the way out and then you have pain in your lower back, side, or lower belly. You can also have blood in your urine and it may hurt when you urinate.  Kidney stones are hard and can get stuck in your urinary tract or block the flow of urine on the way out of the body. The urinary tract is made up of the kidney, ureters, bladder, and urethra. The kidneys make urine and it drains down into tubes called ureters. These ureters are connected to the bladder. The bladder then squeezes out the urine and it exits the body through the urethra.  Treatment depends on the type of stone, size of the stone, and where it is along your urinary tract. Your doctor may send the stone to a lab to learn more about it and how to best treat you.     What care is needed at home?   Ask your doctor what you need to do when you go home. Make sure you ask questions if you do not understand what the doctor says.  Drink lots of fluids to help pass your kidney stone.  You may be asked to use a filter to strain your urine. The filter catches the stones.  Your doctor may want to send the stones to a lab to test them.  You may need to take medicine to help with the pain as your kidney stone passes.  What follow-up care is needed?   Your doctor may ask you to make visits to the office to check on your progress. Be sure to keep these visits.  Your doctor will tell you if other tests are needed.  Your doctor may send you to a kidney specialist. This kind of doctor is called a urologist.  What drugs may be needed?   The doctor may order drugs to:  Help with pain  Prevent infection  Help flush out kidney stones  Prevent kidney stones  Will physical activity be limited?   You may have to limit your  activity. Talk to your doctor about the right amount of activity for you.  What changes to diet are needed?   Talk to your doctor or dietitian about your personal diet plan. Ask if there are foods you should eat more or less of, based on the kind of stone you had.  Avoid caffeinated drinks that can overwork your urinary tract.  What problems could happen?   Kidney infection  Kidney damage  Block in the urinary system  High blood pressure  What can be done to prevent this health problem?   Prevent or treat urinary tract infections.   Drink lots of water during the day. When you have less fluid in your body, urine becomes concentrated. This increases your chance of kidney stones.  Take drugs as ordered by your doctor.  Limit foods or drugs that may cause kidney stones.  When do I need to call the doctor?   You do not urinate for more than 8 hours.  You have a fever of 100.4°F (38°C) or higher or chills.  Your urine is cloudy, smells bad, or is more bloody.  The pain from your kidney stone gets very bad and is not helped by pain medicine.  You are throwing up and cant keep liquids down.  Your pain does not go away after 1 to 2 weeks.  Teach Back: Helping You Understand   The Teach Back Method helps you understand the information we are giving you. After you talk with the staff, tell them in your own words what you learned. This helps to make sure the staff has described each thing clearly. It also helps to explain things that may have been confusing. Before going home, make sure you can do these:  I can tell you about my condition.  I can tell you what changes I need to make with my diet or drugs.  I can tell you what I will do if I have very bad pain in my back or side.  Where can I learn more?   American Academy of Family Physicians  https://familydoctor.org/condition/kidney-stones/   National Kidney and Urologic Diseases Information Clearinghouse  http://kidney.niddk.nih.gov/kudiseases/pubs/stones_ez/   NHS  Choices  https://www.nhs.uk/conditions/kidney-stones/   Last Reviewed Date   2021-06-08  Consumer Information Use and Disclaimer   This information is not specific medical advice and does not replace information you receive from your health care provider. This is only a brief summary of general information. It does NOT include all information about conditions, illnesses, injuries, tests, procedures, treatments, therapies, discharge instructions or life-style choices that may apply to you. You must talk with your health care provider for complete information about your health and treatment options. This information should not be used to decide whether or not to accept your health care providers advice, instructions or recommendations. Only your health care provider has the knowledge and training to provide advice that is right for you.  Copyright   Copyright © 2021 [employer] and its affiliates and/or licensors. All rights reserved.

## 2025-07-02 NOTE — PROGRESS NOTES
Subjective:      Patient ID: Galina Walker is a 54 y.o. female.    Vitals:  vitals were not taken for this visit.     Chief Complaint: kidney stone pain and Constipation      Visit Type: TELE AUDIOVISUAL    Patient Location: Home     Present with the patient at the time of consultation: TELEMED PRESENT WITH PATIENT: None    Past Medical History:   Diagnosis Date    C5-C7 ACDF 2023    Cervical stenosis of spinal canal 2023    RODNEY (generalized anxiety disorder)     GERD (gastroesophageal reflux disease)     Hyperlipidemia     Intractable chronic migraine without aura and without status migrainosus     Mixed stress and urge urinary incontinence     Pituitary mass 2022     Past Surgical History:   Procedure Laterality Date     SECTION  2006    COLECTOMY  2017    COLONOSCOPY  2022    COLONOSCOPY  2017    [facility], Rony Majano MD    HYSTERECTOMY  25    HYSTERECTOMY, TOTAL, LAPAROSCOPIC, WITH SALPINGO-OOPHORECTOMY  2024    NECK SURGERY  2019    SPINE SURGERY  2023    C5-C7 ACDF    TUBAL LIGATION  2006     Review of patient's allergies indicates:   Allergen Reactions    Ibuprofen Nausea Only     Medications Ordered Prior to Encounter[1]  Family History   Problem Relation Name Age of Onset    Ovarian cancer Mother Galina Magallanes 48    Cancer Mother Galina Magallanes     Arthritis Father Galina Magallanes     Colon cancer Father Galina Magallanes         age 40    Cancer Father Galina Magallanes            [facility]vv Intake    2025  3:53 PM CDT - Filed by Patient   What is your current physical address in the event of a medical emergency? [address] [place] [address]   Are you able to take your vital signs? No   Please attach any relevant images or files    Is your employer contracted with [facility]? No         Recently seen in the ED for abdominal pain. Dx with constipation and a kidney stone. Pain to the right lower  quadrant radiating to the back. Unable to take Ibuprofen. Tylenol not relieving the pain. Seen by GYN provider today. Hyoscyamine prescribed which she needs to . LBM today, no blood in the stool. Remains with abdominal pain. Seeking further treatment options at this time.    Constipation  Associated symptoms include abdominal pain and back pain.       Gastrointestinal:  Positive for abdominal pain and constipation.   Genitourinary:  Positive for history of kidney stones. Negative for dysuria, urine decreased and hematuria.   Musculoskeletal:  Positive for back pain.        Objective:   The physical exam was conducted virtually.  Physical Exam   Constitutional: She is oriented to person, place, and time. She does not appear ill. No distress.   HENT:   Head: Normocephalic and atraumatic.   Nose: Nose normal.   Eyes: Extraocular movement intact   Pulmonary/Chest: Effort normal.   Abdominal: Normal appearance.   Musculoskeletal: Normal range of motion.         General: Normal range of motion.   Neurological: no focal deficit. She is alert and oriented to person, place, and time.   Psychiatric: Her behavior is normal. Mood normal.   Vitals reviewed.      Assessment:     1. Abdominal pain, unspecified abdominal location    2. Back pain, unspecified back location, unspecified back pain laterality, unspecified chronicity    3. Constipation, unspecified constipation type    4. History of kidney stones        Plan:   Given history and symptoms further in-person evaluation is needed for a diagnosis and treatment plan. In-person would be best for pain control. Urology messaged for follow-up.    Patient encouraged to monitor symptoms closely and instructed to follow-up for new or worsening symptoms. Further, in-person, evaluation is necessary for continued treatment. Please follow up in Urgent Care, ER, or  with your primary care doctor or specialist as needed. Verbally discussed plan. Patient confirms understanding and  is in agreement with treatment and plan.     You must understand that you've received a [facility] Care evaluation only and that you may be released before all your medical problems are known or treated. You, the patient, will arrange for follow-up care as instructed.      Abdominal pain, unspecified abdominal location    Back pain, unspecified back location, unspecified back pain laterality, unspecified chronicity    Constipation, unspecified constipation type    History of kidney stones        Patient Instructions   Patient Education       Kidney Stones Discharge Instructions   About this topic   Kidney stones are a build up of salts and minerals from your urine. Most of the time a kidney stone leaves your body when you urinate. Sometimes the kidney stone can get stuck on the way out and then you have pain in your lower back, side, or lower belly. You can also have blood in your urine and it may hurt when you urinate.  Kidney stones are hard and can get stuck in your urinary tract or block the flow of urine on the way out of the body. The urinary tract is made up of the kidney, ureters, bladder, and urethra. The kidneys make urine and it drains down into tubes called ureters. These ureters are connected to the bladder. The bladder then squeezes out the urine and it exits the body through the urethra.  Treatment depends on the type of stone, size of the stone, and where it is along your urinary tract. Your doctor may send the stone to a lab to learn more about it and how to best treat you.     What care is needed at home?   Ask your doctor what you need to do when you go home. Make sure you ask questions if you do not understand what the doctor says.  Drink lots of fluids to help pass your kidney stone.  You may be asked to use a filter to strain your urine. The filter catches the stones.  Your doctor may want to send the stones to a lab to test them.  You may need to take medicine to help with the pain as your kidney  stone passes.  What follow-up care is needed?   Your doctor may ask you to make visits to the office to check on your progress. Be sure to keep these visits.  Your doctor will tell you if other tests are needed.  Your doctor may send you to a kidney specialist. This kind of doctor is called a urologist.  What drugs may be needed?   The doctor may order drugs to:  Help with pain  Prevent infection  Help flush out kidney stones  Prevent kidney stones  Will physical activity be limited?   You may have to limit your activity. Talk to your doctor about the right amount of activity for you.  What changes to diet are needed?   Talk to your doctor or dietitian about your personal diet plan. Ask if there are foods you should eat more or less of, based on the kind of stone you had.  Avoid caffeinated drinks that can overwork your urinary tract.  What problems could happen?   Kidney infection  Kidney damage  Block in the urinary system  High blood pressure  What can be done to prevent this health problem?   Prevent or treat urinary tract infections.   Drink lots of water during the day. When you have less fluid in your body, urine becomes concentrated. This increases your chance of kidney stones.  Take drugs as ordered by your doctor.  Limit foods or drugs that may cause kidney stones.  When do I need to call the doctor?   You do not urinate for more than 8 hours.  You have a fever of 100.4°F (38°C) or higher or chills.  Your urine is cloudy, smells bad, or is more bloody.  The pain from your kidney stone gets very bad and is not helped by pain medicine.  You are throwing up and cant keep liquids down.  Your pain does not go away after 1 to 2 weeks.  Teach Back: Helping You Understand   The Teach Back Method helps you understand the information we are giving you. After you talk with the staff, tell them in your own words what you learned. This helps to make sure the staff has described each thing clearly. It also helps to  explain things that may have been confusing. Before going home, make sure you can do these:  I can tell you about my condition.  I can tell you what changes I need to make with my diet or drugs.  I can tell you what I will do if I have very bad pain in my back or side.  Where can I learn more?   American Academy of Family Physicians  https://familydoctor.org/condition/kidney-stones/   National Kidney and Urologic Diseases Information Clearinghouse  http://kidney.niddk.nih.gov/kudiseases/pubs/stones_ez/   NHS Choices  https://www.nhs.uk/conditions/kidney-stones/   Last Reviewed Date   2021-06-08  Consumer Information Use and Disclaimer   This information is not specific medical advice and does not replace information you receive from your health care provider. This is only a brief summary of general information. It does NOT include all information about conditions, illnesses, injuries, tests, procedures, treatments, therapies, discharge instructions or life-style choices that may apply to you. You must talk with your health care provider for complete information about your health and treatment options. This information should not be used to decide whether or not to accept your health care providers advice, instructions or recommendations. Only your health care provider has the knowledge and training to provide advice that is right for you.  Copyright   Copyright © 2021 UpToDate, Inc. and its affiliates and/or licensors. All rights reserved.                 [1]   Current Outpatient Medications on File Prior to Visit   Medication Sig Dispense Refill    atorvastatin (LIPITOR) 10 MG tablet Take 1 tablet (10 mg total) by mouth once daily. (Patient not taking: Reported on 7/2/2025) 90 tablet 1    butalbital-acetaminophen-caffeine -40 mg (FIORICET, ESGIC) -40 mg per tablet Take 1 tablet by mouth every 4 (four) hours as needed for Pain. 12 tablet 0    CAPLYTA 42 mg Cap Take 1 capsule by mouth.       cholecalciferol, vitamin D3, 1,250 mcg (50,000 unit) capsule Take 50,000 Units by mouth every 7 days.      conjugated estrogens (PREMARIN) vaginal cream Place 1 g vaginally once daily. (Patient not taking: Reported on 2025) 1 applicator 11    diclofenac (VOLTAREN) 75 MG EC tablet Take 75 mg by mouth 2 (two) times daily.      EScitalopram oxalate (LEXAPRO) 20 MG tablet Take 1 tablet (20 mg total) by mouth once daily. (Patient not taking: Reported on 2025) 90 tablet 0    estradiol 0.05 mg/24 hr td ptwk 0.05 mg/24 hr PTWK Place 1 patch onto the skin every 7 days. 4 patch 11    hydrocortisone 2.5 % cream Apply topically.      hydrOXYzine HCL (ATARAX) 25 MG tablet Take 1 tablet (25 mg total) by mouth 3 (three) times daily as needed for Anxiety. (Patient not taking: Reported on 2025) 90 tablet 1    hydrOXYzine pamoate (VISTARIL) 50 MG Cap Take 50 mg by mouth.      hyoscyamine 0.125 mg Subl Place 1 tablet (0.125 mg total) under the tongue every 4 (four) hours as needed. 20 tablet 0    LACTULOSE ORAL Take 20 g by mouth 2 (two) times daily as needed.      levocetirizine (XYZAL) 5 MG tablet Take 1 tablet (5 mg total) by mouth every evening. (Patient not taking: Reported on 2025) 90 tablet 1    mirabegron (MYRBETRIQ) 50 mg Tb24 Take 1 tablet (50 mg total) by mouth once daily. 90 tablet 3    pregabalin (LYRICA) 200 MG Cap Take 200 mg by mouth 3 (three) times daily.      traZODone (DESYREL) 100 MG tablet Take 100 mg by mouth every evening.      [DISCONTINUED] busPIRone (BUSPAR) 30 MG Tab Take 30 mg by mouth 2 (two) times daily.      [DISCONTINUED] CONSTULOSE 10 gram/15 mL solution SMARTSI Milliliter(s) By Mouth Twice Daily PRN      [DISCONTINUED] estradiol 0.025 mg/24 hr 0.025 mg/24 hr Place 1 patch onto the skin every 7 days. 4 patch 11    [DISCONTINUED] gabapentin (NEURONTIN) 300 MG capsule Take 1 capsule (300 mg total) by mouth once daily. (Patient not taking: Reported on 10/4/2024) 30 capsule 0     [DISCONTINUED] HYDROcodone-acetaminophen (NORCO) 5-325 mg per tablet Take 1 tablet by mouth 2 (two) times daily as needed.      [DISCONTINUED] hyoscyamine 0.125 mg Subl Place 1 tablet (0.125 mg total) under the tongue 3 (three) times daily as needed (abbominal pain). (Patient not taking: Reported on 3/13/2025) 20 tablet 0     No current facility-administered medications on file prior to visit.

## 2025-07-16 ENCOUNTER — PROCEDURE VISIT (OUTPATIENT)
Dept: UROLOGY | Facility: CLINIC | Age: 55
End: 2025-07-16
Payer: MEDICAID

## 2025-07-16 VITALS
DIASTOLIC BLOOD PRESSURE: 83 MMHG | RESPIRATION RATE: 18 BRPM | HEIGHT: 69 IN | BODY MASS INDEX: 30.96 KG/M2 | OXYGEN SATURATION: 98 % | SYSTOLIC BLOOD PRESSURE: 134 MMHG | HEART RATE: 61 BPM | WEIGHT: 209 LBS

## 2025-07-16 DIAGNOSIS — N30.10 INTERSTITIAL CYSTITIS: Primary | ICD-10-CM

## 2025-07-16 DIAGNOSIS — N20.0 RENAL CALCULUS: ICD-10-CM

## 2025-07-16 PROCEDURE — 52000 CYSTOURETHROSCOPY: CPT | Mod: PBBFAC | Performed by: UROLOGY

## 2025-07-16 PROCEDURE — 52000 CYSTOURETHROSCOPY: CPT | Mod: S$PBB,,, | Performed by: UROLOGY

## 2025-07-16 PROCEDURE — 81001 URINALYSIS AUTO W/SCOPE: CPT | Mod: PBBFAC | Performed by: UROLOGY

## 2025-07-16 PROCEDURE — 99212 OFFICE O/P EST SF 10 MIN: CPT | Mod: 25,S$PBB,, | Performed by: UROLOGY

## 2025-07-16 RX ORDER — LIDOCAINE HYDROCHLORIDE 20 MG/ML
JELLY TOPICAL
Status: COMPLETED | OUTPATIENT
Start: 2025-07-16 | End: 2025-07-16

## 2025-07-16 RX ORDER — CIPROFLOXACIN 500 MG/1
500 TABLET, FILM COATED ORAL
Status: COMPLETED | OUTPATIENT
Start: 2025-07-16 | End: 2025-07-16

## 2025-07-16 RX ADMIN — CIPROFLOXACIN 500 MG: 500 TABLET, FILM COATED ORAL at 11:07

## 2025-07-16 RX ADMIN — LIDOCAINE HYDROCHLORIDE: 20 JELLY TOPICAL at 11:07

## 2025-07-16 NOTE — PROGRESS NOTES
Pt seen by Dr. Roque. Cysto performed in clinic. Consents signed and obtained by staff. Pt received medication per procedure protocol. Ciprofloxacin HCl tablet 500 mg & LIDOcaine HCl 2% urojet administered and tolerated well. RTC 1 month with NP. Pt education given both written and verbal.

## 2025-07-27 NOTE — PROCEDURES
CC:  Cystoscopy    HPI:  Galina Walker is a 54 y.o. female here for cystoscopy for bladder pain.  She has had consistent complaints of bladder pain.  She has been getting instillations for this.  She is here today for a cystoscopy to rule out any bladder pathology.    Urinalysis:  Results for orders placed or performed in visit on 25   POCT URINE DIPSTICK WITH MICROSCOPE, AUTOMATED   Result Value Ref Range    Color, UA Yellow     Spec Grav UA 1.025     pH, UA 5.5     WBC, UA Negative     Nitrite, UA Negative     Protein, POC Negative     Glucose, UA Negative     Ketones, UA Negative     Urobilinogen, UA 0.2     Bilirubin, POC Negative     Blood, UA Negative        Microscopic Urinalysis:  WBC:   None per HPF     RBC:    None per HPF     Bacteria:    None per HPF     Squamous epithelial cells:  None per HPF  Crystals:   None    Imaging:  CT - 2025:  2 mm right lower pole calcification    ROS:  All systems reviewed and are negative except as documented in HPI and/or Assessment and Plan.     Patient Active Problem List:     Problem List[1]     Past Medical History:  Past Medical History:   Diagnosis Date    C5-C7 ACDF 2023    Cervical stenosis of spinal canal 2023    RODNEY (generalized anxiety disorder)     GERD (gastroesophageal reflux disease)     Hyperlipidemia     Intractable chronic migraine without aura and without status migrainosus     Mixed stress and urge urinary incontinence     Pituitary mass 2022        Past Surgical History:  Past Surgical History:   Procedure Laterality Date     SECTION  2006    COLECTOMY  2017    COLONOSCOPY  2022    COLONOSCOPY  2017    [facility], Rony Majano MD    HYSTERECTOMY  25    HYSTERECTOMY, TOTAL, LAPAROSCOPIC, WITH SALPINGO-OOPHORECTOMY  2024    NECK SURGERY  2019    SPINE SURGERY  2023    C5-C7 ACDF    TUBAL LIGATION  2006        Family History:  Family History   Problem Relation Name Age of  Onset    Ovarian cancer Mother Galina Magallanes 48    Cancer Mother Galina Magallanes     Arthritis Father Galina Magallanes     Colon cancer Father Galina Magallanes         age 40    Cancer Father Galina Magallanes         Social History:  Social History     Socioeconomic History    Marital status: Single   Tobacco Use    Smoking status: Every Day     Current packs/day: 0.50     Average packs/day: 0.6 packs/day for 34.2 years (20.1 ttl pk-yrs)     Types: Cigarettes, Vaping with nicotine     Start date: 1987     Last attempt to quit: 2000     Passive exposure: Current    Smokeless tobacco: Never    Tobacco comments:     Pt smokes 5-10 cpd and vapes   Substance and Sexual Activity    Alcohol use: Yes     Comment: socially    Drug use: Yes     Frequency: 2.0 times per week     Types: Marijuana    Sexual activity: Yes     Partners: Male     Birth control/protection: None, See Surgical Hx   Social History Narrative    ** Merged History Encounter **          Social Drivers of Health     Financial Resource Strain: High Risk (8/1/2024)    Received from [facility]    Overall Financial Resource Strain (CARDIA)     Difficulty of Paying Living Expenses: Very hard   Food Insecurity: Food Insecurity Present (8/22/2024)    Received from [facility]    Hunger Vital Sign     Worried About Running Out of Food in the Last Year: Sometimes true     Ran Out of Food in the Last Year: Sometimes true   Transportation Needs: Unmet Transportation Needs (8/22/2024)    Received from [facility]    PRAPARE - Transportation     Lack of Transportation (Medical): Yes     Lack of Transportation (Non-Medical): Yes   Physical Activity: Inactive (8/1/2024)    Received from [facility]    Exercise Vital Sign     Days of Exercise per Week: 0 days     Minutes of Exercise per Session: 0 min   Stress: Stress Concern Present (8/1/2024)    Received from [facility]    [employer] - Occupational Stress Questionnaire     Feeling of  Stress : Very much        Allergies:  Review of patient's allergies indicates:   Allergen Reactions    Ibuprofen Nausea Only        Objective:  Vitals:    07/16/25 1136   BP: 134/83   Pulse: 61   Resp: 18     General:  Well developed, well nourished adult female in no acute distress  Abdomen: Soft, nontender, no masses  Extremities:  No clubbing, cyanosis, or edema  Neurologic:  Grossly intact  Musculoskeletal:  Normal tone  :  External genitalia is normal without lesions.  Vagina is normal.      Cystoscopy:         - Urethral meatus:  No strictures        - Urethra:  Normal without strictures or lesions        - Bladder neck:  Normal        - Bladder:  No mucosal abnormalities        - Ureteral orifices:  On the trigone with clear efflux bilaterally    The patient tolerated the procedure well without complications.  She was given Cipro 500mg, one tablet in the clinic.   The urethra was anesthetized with 2% Lidocaine Jelly, Urojet.      Assessment:  1. Interstitial cystitis  - POCT URINE DIPSTICK WITH MICROSCOPE, AUTOMATED    2. Renal calculus     Plan:  Cystoscopy is negative.  I did discuss possibility of Valium vaginal suppositories however she is not sure that she can get these because of the cost.  The renal calculus can be followed in the future likely with an ultrasound it probably is not visible on KUB.    Return appointment:  She will follow with the nurse practitioner as arranged.               [1]   Patient Active Problem List  Diagnosis    Pituitary mass    Rotator cuff syndrome, left    BMI 23.0-23.9, adult    Myopia with astigmatism, bilateral    Age-related nuclear cataract of both eyes    Thyroid eye disease    Cervical stenosis of spinal canal    C5-C7 ACDF    Hyperlipidemia    RODNEY (generalized anxiety disorder)    Cervical radiculopathy    Nocturia    Bladder pain

## 2025-08-04 ENCOUNTER — OFFICE VISIT (OUTPATIENT)
Dept: GYNECOLOGY | Facility: CLINIC | Age: 55
End: 2025-08-04
Payer: MEDICAID

## 2025-08-04 VITALS
SYSTOLIC BLOOD PRESSURE: 117 MMHG | RESPIRATION RATE: 18 BRPM | WEIGHT: 215.19 LBS | BODY MASS INDEX: 31.87 KG/M2 | DIASTOLIC BLOOD PRESSURE: 80 MMHG | HEART RATE: 65 BPM | TEMPERATURE: 98 F | OXYGEN SATURATION: 100 % | HEIGHT: 69 IN

## 2025-08-04 DIAGNOSIS — N95.1 VASOMOTOR SYMPTOMS DUE TO MENOPAUSE: Primary | ICD-10-CM

## 2025-08-04 DIAGNOSIS — Z11.3 ROUTINE SCREENING FOR STI (SEXUALLY TRANSMITTED INFECTION): ICD-10-CM

## 2025-08-04 DIAGNOSIS — N89.8 VAGINAL LESION: ICD-10-CM

## 2025-08-04 LAB
C TRACH DNA SPEC QL NAA+PROBE: NOT DETECTED
CLUE CELLS VAG QL WET PREP: ABNORMAL
N GONORRHOEA DNA SPEC QL NAA+PROBE: NOT DETECTED
SPECIMEN SOURCE: NORMAL
T VAGINALIS VAG QL WET PREP: ABNORMAL
WBC #/AREA VAG WET PREP: ABNORMAL
YEAST SPEC QL WET PREP: ABNORMAL

## 2025-08-04 PROCEDURE — 99213 OFFICE O/P EST LOW 20 MIN: CPT | Mod: PBBFAC

## 2025-08-04 PROCEDURE — 87210 SMEAR WET MOUNT SALINE/INK: CPT

## 2025-08-04 PROCEDURE — 87591 N.GONORRHOEAE DNA AMP PROB: CPT

## 2025-08-04 RX ORDER — FLUOXETINE HYDROCHLORIDE 40 MG/1
40 CAPSULE ORAL
COMMUNITY
Start: 2025-07-23

## 2025-08-04 RX ORDER — ESTRADIOL 0.04 MG/D
1 FILM, EXTENDED RELEASE TRANSDERMAL
Qty: 8 PATCH | Refills: 11 | Status: SHIPPED | OUTPATIENT
Start: 2025-08-04 | End: 2026-08-04

## 2025-08-04 RX ORDER — HYDROCHLOROTHIAZIDE 12.5 MG/1
12.5 TABLET ORAL
COMMUNITY
Start: 2025-07-08

## 2025-08-04 NOTE — PROGRESS NOTES
[facility] GYNECOLOGY CLINIC NOTE     Galina Walker is a 54 y.o.  presenting to GYN clinic for a follow up regarding her HRT estradiol patch treatment. She has a history of DIONE & BSO. She is still having hot flashes with no improvement, however she has not been wearing her patches due to them not sticking. She has been placing the patches on her forearm. She is pleased with her vaginal estrogen treatment at this time and notes that her pain with intercourse has greatly improved. She has not had any additional kidney stones since her last ED visit and is currently being followed by urology for her nephrolithiasis and interstitial cystitis. She has concerns of a bump on her labia majora which she would like examined as well.     She is currently sexually active with 1 new male partner within the past month and does not use condoms.     Gynecology  OB History          4    Para   3    Term   2       1    AB   1    Living   4         SAB   1    IAB   0    Ectopic   0    Multiple   1    Live Births   4                Past Medical History:   Diagnosis Date    C5-C7 ACDF 2023    Cervical stenosis of spinal canal 2023    RODNEY (generalized anxiety disorder)     GERD (gastroesophageal reflux disease)     Hyperlipidemia     Intractable chronic migraine without aura and without status migrainosus     Mixed stress and urge urinary incontinence     Pituitary mass 2022      Past Surgical History:   Procedure Laterality Date     SECTION  2006    COLECTOMY  2017    COLONOSCOPY  2022    COLONOSCOPY  2017    [facility], Rony Majano MD    HYSTERECTOMY  25    HYSTERECTOMY, TOTAL, LAPAROSCOPIC, WITH SALPINGO-OOPHORECTOMY  2024    NECK SURGERY  2019    SPINE SURGERY  2023    C5-C7 ACDF    TUBAL LIGATION  2006      Current Outpatient Medications   Medication Instructions    atorvastatin (LIPITOR) 10 mg, Oral, Daily    butalbital-acetaminophen-caffeine  "-40 mg (FIORICET, ESGIC) -40 mg per tablet 1 tablet, Oral, Every 4 hours PRN    CAPLYTA 42 mg Cap 1 capsule    cholecalciferol (vitamin D3) 50,000 Units, Every 7 days    diclofenac (VOLTAREN) 75 mg, 2 times daily    EScitalopram oxalate (LEXAPRO) 20 mg, Oral, Daily    estradioL (VIVELLE-DOT) 0.0375 mg/24 hr 1 patch, Transdermal, Twice weekly    FLUoxetine 40 mg    hydroCHLOROthiazide 12.5 mg    hydrocortisone 2.5 % cream Apply topically.    hydrOXYzine HCL (ATARAX) 25 mg, Oral, 3 times daily PRN    hydrOXYzine pamoate (VISTARIL) 50 mg    hyoscyamine 0.125 mg, Sublingual, Every 4 hours PRN    LACTULOSE ORAL 20 g, 2 times daily PRN    levocetirizine (XYZAL) 5 mg, Oral, Nightly    mirabegron (MYRBETRIQ) 50 mg, Oral, Daily    pregabalin (LYRICA) 200 mg, 3 times daily    PREMARIN 1 g, Vaginal, Daily    traZODone (DESYREL) 100 mg, Nightly     Social History[1]    Review of Systems  Pertinent items are noted in HPI.     Objective:     /80   Pulse 65   Temp 98 °F (36.7 °C) (Oral)   Resp 18   Ht 5' 9" (1.753 m)   Wt 97.6 kg (215 lb 3.2 oz)   SpO2 100%   BMI 31.78 kg/m²   Physical Exam:  Gen: Well-nourished, well-developed female appearing stated age. Alert, cooperative, in no acute distress.  HEENT: No thyromegaly, goiter, or masses  Breasts: General/bilateral: ° Appearance of the breast was without rashes or lesions. Palpation of the right breast revealed no lumps, tenderness, or nipple discharge  Palpation of the left breast revealed no lumps, tenderness, or nipple discharge   CV: rrr, no murmurs/rubs/gallops  Chest: ctabl, no wheezes/rales/rhonchi  Abdomen: Soft, non-tender, no masses.  Extrem: Extremities normal, atraumatic, non-tender calves.  External genitalia: Normal female genetalia. ~0.3 cm pink, flat circular lesion noted to R labia majora that is slightly tender to palpation. No discharge noted.   Speculum Exam: Vaginal vault without discharge, nonodorous, no lesions/masses seen.    Note: " RN chaperone present for entirety of exam.      Assessment:       54 y.o.  here for f/u of vasomotor symptoms and new vaginal lesion.  1. Vasomotor symptoms due to menopause        2. Vaginal lesion        3. Routine screening for STI (sexually transmitted infection)  Wet Prep, Genital    Chlamydia/GC, PCR             Plan:     Vasomotor symptoms: Dicussed proper application technique for estradiol patches. Will try twice weekly estradiol patch for better adherence. Continue Premerin cream twice weekly.   Vaginal lesion: Does not appear to be an HSV-like lesion, likely ingrown hair. Continue to monitor.   STI screening: Patient requesting STI screening. Wet Prep and CG/Chlamydia sent.  Return to clinic 6 months via teleheath    Discussed patient and plan with Dr. Provost Izabella Sanchez,MS3  [facility]-NO    Elida Foster MD    [facility] Family Medicine Resident, HO-2         [1]   Social History  Tobacco Use    Smoking status: Every Day     Current packs/day: 0.50     Average packs/day: 0.6 packs/day for 34.2 years (20.1 ttl pk-yrs)     Types: Cigarettes, Vaping with nicotine     Start date:      Last attempt to quit:      Passive exposure: Current    Smokeless tobacco: Never    Tobacco comments:     Pt smokes 5-10 cpd and vapes   Substance Use Topics    Alcohol use: Yes     Comment: socially    Drug use: Yes     Frequency: 2.0 times per week     Types: Marijuana

## 2025-08-12 DIAGNOSIS — N20.0 RENAL CALCULUS: Primary | ICD-10-CM

## 2025-08-18 ENCOUNTER — HOSPITAL ENCOUNTER (OUTPATIENT)
Dept: RADIOLOGY | Facility: HOSPITAL | Age: 55
Discharge: HOME | End: 2025-08-18
Attending: NURSE PRACTITIONER
Payer: MEDICAID

## 2025-08-18 DIAGNOSIS — N20.0 RENAL CALCULUS: ICD-10-CM

## 2025-08-18 PROCEDURE — 76775 US EXAM ABDO BACK WALL LIM: CPT | Mod: TC

## 2025-09-04 ENCOUNTER — CLINICAL SUPPORT (OUTPATIENT)
Dept: SMOKING CESSATION | Facility: CLINIC | Age: 55
End: 2025-09-04
Payer: COMMERCIAL

## 2025-09-04 DIAGNOSIS — F17.200 NICOTINE DEPENDENCE: Primary | ICD-10-CM

## 2025-09-04 PROCEDURE — 99999 PR PBB SHADOW E&M-EST. PATIENT-LVL I: CPT | Mod: PBBFAC,,, | Performed by: GENERAL PRACTICE
